# Patient Record
Sex: MALE | Race: WHITE | NOT HISPANIC OR LATINO | URBAN - METROPOLITAN AREA
[De-identification: names, ages, dates, MRNs, and addresses within clinical notes are randomized per-mention and may not be internally consistent; named-entity substitution may affect disease eponyms.]

---

## 2017-01-26 ENCOUNTER — OUTPATIENT (OUTPATIENT)
Dept: OUTPATIENT SERVICES | Facility: HOSPITAL | Age: 57
LOS: 1 days | End: 2017-01-26

## 2017-04-05 ENCOUNTER — APPOINTMENT (OUTPATIENT)
Dept: CARDIOLOGY | Facility: CLINIC | Age: 57
End: 2017-04-05

## 2017-04-24 ENCOUNTER — OUTPATIENT (OUTPATIENT)
Dept: OUTPATIENT SERVICES | Facility: HOSPITAL | Age: 57
LOS: 1 days | End: 2017-04-24

## 2017-07-11 ENCOUNTER — OUTPATIENT (OUTPATIENT)
Dept: OUTPATIENT SERVICES | Facility: HOSPITAL | Age: 57
LOS: 1 days | End: 2017-07-11

## 2017-07-25 ENCOUNTER — OUTPATIENT (OUTPATIENT)
Dept: OUTPATIENT SERVICES | Facility: HOSPITAL | Age: 57
LOS: 1 days | End: 2017-07-25

## 2017-08-10 ENCOUNTER — APPOINTMENT (OUTPATIENT)
Dept: CARDIOLOGY | Facility: CLINIC | Age: 57
End: 2017-08-10
Payer: COMMERCIAL

## 2017-08-10 PROCEDURE — 99214 OFFICE O/P EST MOD 30 MIN: CPT

## 2017-09-16 ENCOUNTER — TRANSCRIPTION ENCOUNTER (OUTPATIENT)
Age: 57
End: 2017-09-16

## 2017-09-18 ENCOUNTER — APPOINTMENT (OUTPATIENT)
Dept: CARDIOLOGY | Facility: CLINIC | Age: 57
End: 2017-09-18
Payer: COMMERCIAL

## 2017-09-18 PROCEDURE — 93306 TTE W/DOPPLER COMPLETE: CPT

## 2017-09-18 PROCEDURE — 78452 HT MUSCLE IMAGE SPECT MULT: CPT

## 2017-09-18 PROCEDURE — A9502: CPT

## 2017-09-18 PROCEDURE — 93015 CV STRESS TEST SUPVJ I&R: CPT

## 2017-09-20 ENCOUNTER — APPOINTMENT (OUTPATIENT)
Dept: CARDIOLOGY | Facility: CLINIC | Age: 57
End: 2017-09-20

## 2017-09-28 ENCOUNTER — APPOINTMENT (OUTPATIENT)
Dept: CARDIOLOGY | Facility: CLINIC | Age: 57
End: 2017-09-28
Payer: COMMERCIAL

## 2017-09-28 PROCEDURE — 99214 OFFICE O/P EST MOD 30 MIN: CPT

## 2018-02-05 ENCOUNTER — RECORD ABSTRACTING (OUTPATIENT)
Age: 58
End: 2018-02-05

## 2018-02-06 ENCOUNTER — MEDICATION RENEWAL (OUTPATIENT)
Age: 58
End: 2018-02-06

## 2018-02-06 ENCOUNTER — RX RENEWAL (OUTPATIENT)
Age: 58
End: 2018-02-06

## 2018-03-28 ENCOUNTER — APPOINTMENT (OUTPATIENT)
Dept: CARDIOLOGY | Facility: CLINIC | Age: 58
End: 2018-03-28

## 2018-05-02 ENCOUNTER — NON-APPOINTMENT (OUTPATIENT)
Age: 58
End: 2018-05-02

## 2018-05-02 ENCOUNTER — APPOINTMENT (OUTPATIENT)
Dept: CARDIOLOGY | Facility: CLINIC | Age: 58
End: 2018-05-02
Payer: COMMERCIAL

## 2018-05-02 VITALS
WEIGHT: 226 LBS | DIASTOLIC BLOOD PRESSURE: 68 MMHG | HEART RATE: 72 BPM | HEIGHT: 72 IN | SYSTOLIC BLOOD PRESSURE: 130 MMHG | BODY MASS INDEX: 30.61 KG/M2

## 2018-05-02 DIAGNOSIS — Z82.0 FAMILY HISTORY OF EPILEPSY AND OTHER DISEASES OF THE NERVOUS SYSTEM: ICD-10-CM

## 2018-05-02 DIAGNOSIS — F32.9 MAJOR DEPRESSIVE DISORDER, SINGLE EPISODE, UNSPECIFIED: ICD-10-CM

## 2018-05-02 DIAGNOSIS — Z86.19 PERSONAL HISTORY OF OTHER INFECTIOUS AND PARASITIC DISEASES: ICD-10-CM

## 2018-05-02 DIAGNOSIS — Z82.49 FAMILY HISTORY OF ISCHEMIC HEART DISEASE AND OTHER DISEASES OF THE CIRCULATORY SYSTEM: ICD-10-CM

## 2018-05-02 DIAGNOSIS — Z83.3 FAMILY HISTORY OF DIABETES MELLITUS: ICD-10-CM

## 2018-05-02 DIAGNOSIS — Z87.898 PERSONAL HISTORY OF OTHER SPECIFIED CONDITIONS: ICD-10-CM

## 2018-05-02 PROCEDURE — 93000 ELECTROCARDIOGRAM COMPLETE: CPT

## 2018-05-02 PROCEDURE — 99214 OFFICE O/P EST MOD 30 MIN: CPT

## 2018-05-02 RX ORDER — BUPROPION HYDROCHLORIDE 300 MG/1
300 TABLET, EXTENDED RELEASE ORAL DAILY
Refills: 0 | Status: ACTIVE | COMMUNITY

## 2018-05-02 RX ORDER — ESCITALOPRAM OXALATE 10 MG/1
10 TABLET, FILM COATED ORAL DAILY
Refills: 0 | Status: ACTIVE | COMMUNITY

## 2018-05-03 PROBLEM — Z87.898 H/O SHORTNESS OF BREATH: Status: ACTIVE | Noted: 2018-02-05

## 2018-05-03 PROBLEM — Z83.3 FAMILY HISTORY OF DIABETES MELLITUS: Status: ACTIVE | Noted: 2018-05-03

## 2018-05-03 PROBLEM — Z82.49 FAMILY HISTORY OF CORONARY ARTERY DISEASE: Status: ACTIVE | Noted: 2018-05-03

## 2018-05-03 PROBLEM — Z82.0 FAMILY HISTORY OF ALZHEIMER'S DISEASE: Status: ACTIVE | Noted: 2018-05-03

## 2018-05-04 ENCOUNTER — RX RENEWAL (OUTPATIENT)
Age: 58
End: 2018-05-04

## 2018-08-07 ENCOUNTER — RX RENEWAL (OUTPATIENT)
Age: 58
End: 2018-08-07

## 2018-11-12 ENCOUNTER — APPOINTMENT (OUTPATIENT)
Dept: CARDIOLOGY | Facility: CLINIC | Age: 58
End: 2018-11-12
Payer: COMMERCIAL

## 2018-11-12 VITALS
OXYGEN SATURATION: 95 % | HEART RATE: 78 BPM | SYSTOLIC BLOOD PRESSURE: 120 MMHG | DIASTOLIC BLOOD PRESSURE: 68 MMHG | WEIGHT: 220 LBS | HEIGHT: 72 IN | BODY MASS INDEX: 29.8 KG/M2

## 2018-11-12 PROCEDURE — 99213 OFFICE O/P EST LOW 20 MIN: CPT

## 2018-12-28 ENCOUNTER — EMERGENCY (EMERGENCY)
Facility: HOSPITAL | Age: 58
LOS: 1 days | End: 2018-12-28
Payer: MEDICAID

## 2018-12-28 PROCEDURE — 99283 EMERGENCY DEPT VISIT LOW MDM: CPT

## 2019-03-19 ENCOUNTER — RX CHANGE (OUTPATIENT)
Age: 59
End: 2019-03-19

## 2019-03-28 ENCOUNTER — MEDICATION RENEWAL (OUTPATIENT)
Age: 59
End: 2019-03-28

## 2019-05-04 ENCOUNTER — TRANSCRIPTION ENCOUNTER (OUTPATIENT)
Age: 59
End: 2019-05-04

## 2019-05-06 ENCOUNTER — APPOINTMENT (OUTPATIENT)
Dept: CARDIOLOGY | Facility: CLINIC | Age: 59
End: 2019-05-06
Payer: COMMERCIAL

## 2019-05-06 PROCEDURE — 78452 HT MUSCLE IMAGE SPECT MULT: CPT

## 2019-05-06 PROCEDURE — A9502: CPT

## 2019-05-06 PROCEDURE — 93015 CV STRESS TEST SUPVJ I&R: CPT

## 2019-05-13 ENCOUNTER — APPOINTMENT (OUTPATIENT)
Dept: CARDIOLOGY | Facility: CLINIC | Age: 59
End: 2019-05-13
Payer: COMMERCIAL

## 2019-05-13 ENCOUNTER — NON-APPOINTMENT (OUTPATIENT)
Age: 59
End: 2019-05-13

## 2019-05-13 VITALS
WEIGHT: 220 LBS | SYSTOLIC BLOOD PRESSURE: 110 MMHG | OXYGEN SATURATION: 96 % | HEIGHT: 72 IN | DIASTOLIC BLOOD PRESSURE: 80 MMHG | BODY MASS INDEX: 29.8 KG/M2 | HEART RATE: 90 BPM

## 2019-05-13 DIAGNOSIS — I25.2 OLD MYOCARDIAL INFARCTION: ICD-10-CM

## 2019-05-13 PROCEDURE — 93000 ELECTROCARDIOGRAM COMPLETE: CPT

## 2019-05-13 PROCEDURE — 99214 OFFICE O/P EST MOD 30 MIN: CPT

## 2019-10-01 ENCOUNTER — APPOINTMENT (OUTPATIENT)
Dept: CARDIOLOGY | Facility: CLINIC | Age: 59
End: 2019-10-01

## 2019-11-18 ENCOUNTER — APPOINTMENT (OUTPATIENT)
Dept: CARDIOLOGY | Facility: CLINIC | Age: 59
End: 2019-11-18

## 2020-06-30 ENCOUNTER — APPOINTMENT (OUTPATIENT)
Dept: MRI IMAGING | Facility: CLINIC | Age: 60
End: 2020-06-30
Payer: COMMERCIAL

## 2020-06-30 PROCEDURE — 70551 MRI BRAIN STEM W/O DYE: CPT

## 2020-08-26 DIAGNOSIS — R79.89 OTHER SPECIFIED ABNORMAL FINDINGS OF BLOOD CHEMISTRY: ICD-10-CM

## 2020-09-08 ENCOUNTER — RX RENEWAL (OUTPATIENT)
Age: 60
End: 2020-09-08

## 2020-09-25 ENCOUNTER — NON-APPOINTMENT (OUTPATIENT)
Age: 60
End: 2020-09-25

## 2020-09-25 ENCOUNTER — APPOINTMENT (OUTPATIENT)
Dept: CARDIOLOGY | Facility: CLINIC | Age: 60
End: 2020-09-25
Payer: COMMERCIAL

## 2020-09-25 VITALS
BODY MASS INDEX: 31.42 KG/M2 | OXYGEN SATURATION: 98 % | SYSTOLIC BLOOD PRESSURE: 120 MMHG | WEIGHT: 232 LBS | DIASTOLIC BLOOD PRESSURE: 86 MMHG | TEMPERATURE: 97.8 F | HEART RATE: 66 BPM | HEIGHT: 72 IN

## 2020-09-25 PROCEDURE — 99214 OFFICE O/P EST MOD 30 MIN: CPT

## 2020-09-25 PROCEDURE — 93000 ELECTROCARDIOGRAM COMPLETE: CPT

## 2020-10-06 ENCOUNTER — APPOINTMENT (OUTPATIENT)
Dept: CARDIOLOGY | Facility: CLINIC | Age: 60
End: 2020-10-06

## 2020-10-13 ENCOUNTER — APPOINTMENT (OUTPATIENT)
Dept: DERMATOLOGY | Facility: CLINIC | Age: 60
End: 2020-10-13
Payer: COMMERCIAL

## 2020-10-13 DIAGNOSIS — Q83.3 ACCESSORY NIPPLE: ICD-10-CM

## 2020-10-13 DIAGNOSIS — D22.5 MELANOCYTIC NEVI OF TRUNK: ICD-10-CM

## 2020-10-13 DIAGNOSIS — L82.1 OTHER SEBORRHEIC KERATOSIS: ICD-10-CM

## 2020-10-13 PROCEDURE — 99203 OFFICE O/P NEW LOW 30 MIN: CPT

## 2020-10-13 NOTE — HISTORY OF PRESENT ILLNESS
[FreeTextEntry1] : Evaluation of growths [de-identified] : First visit for 60-year-old male presenting for evaluation of growths. Particularly concerned about a lesion on the right forearm. No history of skin cancer.

## 2020-10-13 NOTE — PHYSICAL EXAM
[Alert] : alert [Oriented x 3] : ~L oriented x 3 [Well Nourished] : well nourished [FreeTextEntry3] : The following areas were examined and no significant abnormalities were seen except as noted below:\par \par Type II skin\par \par scalp, face, eyelids, nose, lips, ears, neck, chest, abdomen, back,groin, buttocks, right arm, left arm, right hand, left hand,\par right  leg, left leg, right foot, left foot\par \par Right mid forearm: 4 x 4 mm tan verrucous papule\par Right lower chest: Supernumerary nipple present\par Right mid back: 2 small pink soft papules\par \par No suspicious lesions seen

## 2020-10-20 ENCOUNTER — APPOINTMENT (OUTPATIENT)
Dept: CARDIOLOGY | Facility: CLINIC | Age: 60
End: 2020-10-20
Payer: COMMERCIAL

## 2020-10-20 PROCEDURE — 93306 TTE W/DOPPLER COMPLETE: CPT

## 2021-03-03 ENCOUNTER — RX RENEWAL (OUTPATIENT)
Age: 61
End: 2021-03-03

## 2021-04-30 ENCOUNTER — NON-APPOINTMENT (OUTPATIENT)
Age: 61
End: 2021-04-30

## 2021-04-30 ENCOUNTER — APPOINTMENT (OUTPATIENT)
Dept: CARDIOLOGY | Facility: CLINIC | Age: 61
End: 2021-04-30
Payer: COMMERCIAL

## 2021-04-30 VITALS
DIASTOLIC BLOOD PRESSURE: 80 MMHG | BODY MASS INDEX: 32.82 KG/M2 | WEIGHT: 242 LBS | OXYGEN SATURATION: 98 % | TEMPERATURE: 97.7 F | SYSTOLIC BLOOD PRESSURE: 120 MMHG | HEART RATE: 73 BPM

## 2021-04-30 PROCEDURE — 93000 ELECTROCARDIOGRAM COMPLETE: CPT

## 2021-04-30 PROCEDURE — 99072 ADDL SUPL MATRL&STAF TM PHE: CPT

## 2021-04-30 PROCEDURE — 99214 OFFICE O/P EST MOD 30 MIN: CPT

## 2021-07-14 ENCOUNTER — APPOINTMENT (OUTPATIENT)
Dept: DERMATOLOGY | Facility: CLINIC | Age: 61
End: 2021-07-14
Payer: COMMERCIAL

## 2021-07-14 DIAGNOSIS — L50.3 DERMATOGRAPHIC URTICARIA: ICD-10-CM

## 2021-07-14 DIAGNOSIS — L29.9 PRURITUS, UNSPECIFIED: ICD-10-CM

## 2021-07-14 PROCEDURE — 99213 OFFICE O/P EST LOW 20 MIN: CPT

## 2021-07-14 NOTE — PHYSICAL EXAM
[Alert] : alert [Oriented x 3] : ~L oriented x 3 [Well Nourished] : well nourished [FreeTextEntry3] : Patient wearing a facemask\par \par No rash present\par \par Stroke test: \par 2+ flare\par 1+ wheal\par No pruritus

## 2021-07-14 NOTE — HISTORY OF PRESENT ILLNESS
[FreeTextEntry1] : Itching [de-identified] : Followup visit for 60-year-old male last seen by me on October 13, 2020, with a few month history of episodic migratory pruritus.  Itching waxes and wanes.  Itching does not wake him up.\par No previous treatment. No previous episodes.  On no new medications.\par No one else at home is itchy.

## 2022-01-23 ENCOUNTER — EMERGENCY (EMERGENCY)
Facility: HOSPITAL | Age: 62
LOS: 1 days | Discharge: HOME | End: 2022-01-23
Payer: MEDICAID

## 2022-01-23 ENCOUNTER — OUTPATIENT (OUTPATIENT)
Dept: OUTPATIENT SERVICES | Facility: HOSPITAL | Age: 62
LOS: 1 days | End: 2022-01-23

## 2022-01-23 DIAGNOSIS — R07.9 CHEST PAIN, UNSPECIFIED: ICD-10-CM

## 2022-01-23 DIAGNOSIS — I25.2 OLD MYOCARDIAL INFARCTION: ICD-10-CM

## 2022-01-23 DIAGNOSIS — I25.10 ATHEROSCLEROTIC HEART DISEASE OF NATIVE CORONARY ARTERY WITHOUT ANGINA PECTORIS: ICD-10-CM

## 2022-01-23 DIAGNOSIS — E78.5 HYPERLIPIDEMIA, UNSPECIFIED: ICD-10-CM

## 2022-01-23 DIAGNOSIS — Z79.82 LONG TERM (CURRENT) USE OF ASPIRIN: ICD-10-CM

## 2022-01-23 DIAGNOSIS — I10 ESSENTIAL (PRIMARY) HYPERTENSION: ICD-10-CM

## 2022-01-23 DIAGNOSIS — Z95.5 PRESENCE OF CORONARY ANGIOPLASTY IMPLANT AND GRAFT: ICD-10-CM

## 2022-01-23 PROCEDURE — 99222 1ST HOSP IP/OBS MODERATE 55: CPT

## 2022-01-23 PROCEDURE — 99285 EMERGENCY DEPT VISIT HI MDM: CPT

## 2022-01-23 PROCEDURE — 93306 TTE W/DOPPLER COMPLETE: CPT | Mod: 26

## 2022-01-23 PROCEDURE — 93010 ELECTROCARDIOGRAM REPORT: CPT

## 2022-01-23 PROCEDURE — 71045 X-RAY EXAM CHEST 1 VIEW: CPT | Mod: 26

## 2022-01-24 ENCOUNTER — OUTPATIENT (OUTPATIENT)
Dept: OUTPATIENT SERVICES | Facility: HOSPITAL | Age: 62
LOS: 1 days | End: 2022-01-24

## 2022-01-24 PROCEDURE — 93306 TTE W/DOPPLER COMPLETE: CPT | Mod: 26

## 2022-01-24 PROCEDURE — 99232 SBSQ HOSP IP/OBS MODERATE 35: CPT

## 2022-01-24 PROCEDURE — 93010 ELECTROCARDIOGRAM REPORT: CPT

## 2022-01-27 ENCOUNTER — RESULT CHARGE (OUTPATIENT)
Age: 62
End: 2022-01-27

## 2022-01-28 ENCOUNTER — NON-APPOINTMENT (OUTPATIENT)
Age: 62
End: 2022-01-28

## 2022-01-28 ENCOUNTER — APPOINTMENT (OUTPATIENT)
Dept: CARDIOLOGY | Facility: CLINIC | Age: 62
End: 2022-01-28
Payer: COMMERCIAL

## 2022-01-28 VITALS
OXYGEN SATURATION: 96 % | BODY MASS INDEX: 32.78 KG/M2 | WEIGHT: 242 LBS | DIASTOLIC BLOOD PRESSURE: 74 MMHG | HEART RATE: 73 BPM | SYSTOLIC BLOOD PRESSURE: 122 MMHG | HEIGHT: 72 IN | TEMPERATURE: 97.5 F

## 2022-01-28 PROCEDURE — 99214 OFFICE O/P EST MOD 30 MIN: CPT

## 2022-02-01 ENCOUNTER — NON-APPOINTMENT (OUTPATIENT)
Age: 62
End: 2022-02-01

## 2022-02-02 ENCOUNTER — APPOINTMENT (OUTPATIENT)
Dept: CARDIOLOGY | Facility: CLINIC | Age: 62
End: 2022-02-02
Payer: COMMERCIAL

## 2022-02-02 PROCEDURE — 93015 CV STRESS TEST SUPVJ I&R: CPT

## 2022-02-02 PROCEDURE — A9502: CPT

## 2022-02-02 PROCEDURE — 78452 HT MUSCLE IMAGE SPECT MULT: CPT

## 2022-02-03 ENCOUNTER — NON-APPOINTMENT (OUTPATIENT)
Age: 62
End: 2022-02-03

## 2022-02-04 ENCOUNTER — NON-APPOINTMENT (OUTPATIENT)
Age: 62
End: 2022-02-04

## 2022-02-07 ENCOUNTER — LABORATORY RESULT (OUTPATIENT)
Age: 62
End: 2022-02-07

## 2022-02-08 LAB — SARS-COV-2 N GENE NPH QL NAA+PROBE: NOT DETECTED

## 2022-02-10 ENCOUNTER — OUTPATIENT (OUTPATIENT)
Dept: OUTPATIENT SERVICES | Facility: HOSPITAL | Age: 62
LOS: 1 days | End: 2022-02-10
Payer: MEDICAID

## 2022-02-10 PROCEDURE — 93571 IV DOP VEL&/PRESS C FLO 1ST: CPT | Mod: 26,52,59

## 2022-02-10 PROCEDURE — 93010 ELECTROCARDIOGRAM REPORT: CPT

## 2022-02-10 PROCEDURE — 93458 L HRT ARTERY/VENTRICLE ANGIO: CPT | Mod: 26

## 2022-02-11 ENCOUNTER — INPATIENT (INPATIENT)
Facility: HOSPITAL | Age: 62
LOS: 18 days | Discharge: HOME HEALTH | DRG: 235 | End: 2022-03-02
Attending: THORACIC SURGERY (CARDIOTHORACIC VASCULAR SURGERY) | Admitting: THORACIC SURGERY (CARDIOTHORACIC VASCULAR SURGERY)
Payer: COMMERCIAL

## 2022-02-11 VITALS
RESPIRATION RATE: 18 BRPM | DIASTOLIC BLOOD PRESSURE: 77 MMHG | HEART RATE: 60 BPM | TEMPERATURE: 99 F | OXYGEN SATURATION: 97 % | SYSTOLIC BLOOD PRESSURE: 120 MMHG

## 2022-02-11 DIAGNOSIS — Z90.49 ACQUIRED ABSENCE OF OTHER SPECIFIED PARTS OF DIGESTIVE TRACT: Chronic | ICD-10-CM

## 2022-02-11 DIAGNOSIS — Z98.890 OTHER SPECIFIED POSTPROCEDURAL STATES: Chronic | ICD-10-CM

## 2022-02-11 DIAGNOSIS — I25.10 ATHEROSCLEROTIC HEART DISEASE OF NATIVE CORONARY ARTERY WITHOUT ANGINA PECTORIS: ICD-10-CM

## 2022-02-11 LAB
A1C WITH ESTIMATED AVERAGE GLUCOSE RESULT: 6.4 % — HIGH (ref 4–5.6)
ALBUMIN SERPL ELPH-MCNC: 3.9 G/DL — SIGNIFICANT CHANGE UP (ref 3.3–5)
ALP SERPL-CCNC: 67 U/L — SIGNIFICANT CHANGE UP (ref 40–120)
ALT FLD-CCNC: 33 U/L — SIGNIFICANT CHANGE UP (ref 10–45)
ANION GAP SERPL CALC-SCNC: 10 MMOL/L — SIGNIFICANT CHANGE UP (ref 5–17)
APPEARANCE UR: CLEAR — SIGNIFICANT CHANGE UP
APTT BLD: 32.5 SEC — SIGNIFICANT CHANGE UP (ref 27.5–35.5)
AST SERPL-CCNC: 22 U/L — SIGNIFICANT CHANGE UP (ref 10–40)
BASOPHILS # BLD AUTO: 0.05 K/UL — SIGNIFICANT CHANGE UP (ref 0–0.2)
BASOPHILS NFR BLD AUTO: 0.7 % — SIGNIFICANT CHANGE UP (ref 0–2)
BILIRUB SERPL-MCNC: 0.3 MG/DL — SIGNIFICANT CHANGE UP (ref 0.2–1.2)
BILIRUB UR-MCNC: NEGATIVE — SIGNIFICANT CHANGE UP
BLD GP AB SCN SERPL QL: NEGATIVE — SIGNIFICANT CHANGE UP
BUN SERPL-MCNC: 17 MG/DL — SIGNIFICANT CHANGE UP (ref 7–23)
CALCIUM SERPL-MCNC: 9.3 MG/DL — SIGNIFICANT CHANGE UP (ref 8.4–10.5)
CHLORIDE SERPL-SCNC: 105 MMOL/L — SIGNIFICANT CHANGE UP (ref 96–108)
CHOLEST SERPL-MCNC: 121 MG/DL — SIGNIFICANT CHANGE UP
CO2 SERPL-SCNC: 25 MMOL/L — SIGNIFICANT CHANGE UP (ref 22–31)
COLOR SPEC: SIGNIFICANT CHANGE UP
COVID-19 SPIKE DOMAIN AB INTERP: POSITIVE
COVID-19 SPIKE DOMAIN ANTIBODY RESULT: >250 U/ML — HIGH
CREAT SERPL-MCNC: 0.96 MG/DL — SIGNIFICANT CHANGE UP (ref 0.5–1.3)
DIFF PNL FLD: NEGATIVE — SIGNIFICANT CHANGE UP
EOSINOPHIL # BLD AUTO: 0.09 K/UL — SIGNIFICANT CHANGE UP (ref 0–0.5)
EOSINOPHIL NFR BLD AUTO: 1.3 % — SIGNIFICANT CHANGE UP (ref 0–6)
ESTIMATED AVERAGE GLUCOSE: 137 MG/DL — HIGH (ref 68–114)
FIBRINOGEN PPP-MCNC: 373 MG/DL — SIGNIFICANT CHANGE UP (ref 290–520)
GLUCOSE SERPL-MCNC: 111 MG/DL — HIGH (ref 70–99)
GLUCOSE UR QL: NEGATIVE — SIGNIFICANT CHANGE UP
HCT VFR BLD CALC: 41.1 % — SIGNIFICANT CHANGE UP (ref 39–50)
HDLC SERPL-MCNC: 26 MG/DL — LOW
HGB BLD-MCNC: 13.8 G/DL — SIGNIFICANT CHANGE UP (ref 13–17)
IMM GRANULOCYTES NFR BLD AUTO: 0.1 % — SIGNIFICANT CHANGE UP (ref 0–1.5)
INR BLD: 1.08 RATIO — SIGNIFICANT CHANGE UP (ref 0.88–1.16)
KETONES UR-MCNC: NEGATIVE — SIGNIFICANT CHANGE UP
LEUKOCYTE ESTERASE UR-ACNC: NEGATIVE — SIGNIFICANT CHANGE UP
LIPID PNL WITH DIRECT LDL SERPL: 54 MG/DL — SIGNIFICANT CHANGE UP
LYMPHOCYTES # BLD AUTO: 2.44 K/UL — SIGNIFICANT CHANGE UP (ref 1–3.3)
LYMPHOCYTES # BLD AUTO: 35.8 % — SIGNIFICANT CHANGE UP (ref 13–44)
MCHC RBC-ENTMCNC: 30.1 PG — SIGNIFICANT CHANGE UP (ref 27–34)
MCHC RBC-ENTMCNC: 33.6 GM/DL — SIGNIFICANT CHANGE UP (ref 32–36)
MCV RBC AUTO: 89.5 FL — SIGNIFICANT CHANGE UP (ref 80–100)
MONOCYTES # BLD AUTO: 0.67 K/UL — SIGNIFICANT CHANGE UP (ref 0–0.9)
MONOCYTES NFR BLD AUTO: 9.8 % — SIGNIFICANT CHANGE UP (ref 2–14)
MRSA PCR RESULT.: SIGNIFICANT CHANGE UP
NEUTROPHILS # BLD AUTO: 3.55 K/UL — SIGNIFICANT CHANGE UP (ref 1.8–7.4)
NEUTROPHILS NFR BLD AUTO: 52.3 % — SIGNIFICANT CHANGE UP (ref 43–77)
NITRITE UR-MCNC: NEGATIVE — SIGNIFICANT CHANGE UP
NON HDL CHOLESTEROL: 95 MG/DL — SIGNIFICANT CHANGE UP
NRBC # BLD: 0 /100 WBCS — SIGNIFICANT CHANGE UP (ref 0–0)
NT-PROBNP SERPL-SCNC: 83 PG/ML — SIGNIFICANT CHANGE UP (ref 0–300)
PA ADP PRP-ACNC: 255 PRU — SIGNIFICANT CHANGE UP (ref 194–417)
PH UR: 6 — SIGNIFICANT CHANGE UP (ref 5–8)
PLATELET # BLD AUTO: 228 K/UL — SIGNIFICANT CHANGE UP (ref 150–400)
POTASSIUM SERPL-MCNC: 4.2 MMOL/L — SIGNIFICANT CHANGE UP (ref 3.5–5.3)
POTASSIUM SERPL-SCNC: 4.2 MMOL/L — SIGNIFICANT CHANGE UP (ref 3.5–5.3)
PROT SERPL-MCNC: 6.1 G/DL — SIGNIFICANT CHANGE UP (ref 6–8.3)
PROT UR-MCNC: NEGATIVE — SIGNIFICANT CHANGE UP
PROTHROM AB SERPL-ACNC: 12.9 SEC — SIGNIFICANT CHANGE UP (ref 10.6–13.6)
RBC # BLD: 4.59 M/UL — SIGNIFICANT CHANGE UP (ref 4.2–5.8)
RBC # FLD: 13.1 % — SIGNIFICANT CHANGE UP (ref 10.3–14.5)
RH IG SCN BLD-IMP: POSITIVE — SIGNIFICANT CHANGE UP
S AUREUS DNA NOSE QL NAA+PROBE: SIGNIFICANT CHANGE UP
SARS-COV-2 IGG+IGM SERPL QL IA: >250 U/ML — HIGH
SARS-COV-2 IGG+IGM SERPL QL IA: POSITIVE
SARS-COV-2 RNA SPEC QL NAA+PROBE: SIGNIFICANT CHANGE UP
SODIUM SERPL-SCNC: 140 MMOL/L — SIGNIFICANT CHANGE UP (ref 135–145)
SP GR SPEC: 1.03 — HIGH (ref 1.01–1.02)
TRIGL SERPL-MCNC: 201 MG/DL — HIGH
TSH SERPL-MCNC: 2.66 UIU/ML — SIGNIFICANT CHANGE UP (ref 0.27–4.2)
UROBILINOGEN FLD QL: NEGATIVE — SIGNIFICANT CHANGE UP
WBC # BLD: 6.81 K/UL — SIGNIFICANT CHANGE UP (ref 3.8–10.5)
WBC # FLD AUTO: 6.81 K/UL — SIGNIFICANT CHANGE UP (ref 3.8–10.5)

## 2022-02-11 PROCEDURE — 71045 X-RAY EXAM CHEST 1 VIEW: CPT | Mod: 26

## 2022-02-11 PROCEDURE — 99231 SBSQ HOSP IP/OBS SF/LOW 25: CPT

## 2022-02-11 PROCEDURE — 93880 EXTRACRANIAL BILAT STUDY: CPT | Mod: 26

## 2022-02-11 PROCEDURE — 93010 ELECTROCARDIOGRAM REPORT: CPT

## 2022-02-11 PROCEDURE — 93306 TTE W/DOPPLER COMPLETE: CPT | Mod: 26

## 2022-02-11 RX ORDER — ASPIRIN/CALCIUM CARB/MAGNESIUM 324 MG
325 TABLET ORAL DAILY
Refills: 0 | Status: DISCONTINUED | OUTPATIENT
Start: 2022-02-11 | End: 2022-02-25

## 2022-02-11 RX ORDER — ATORVASTATIN CALCIUM 80 MG/1
1 TABLET, FILM COATED ORAL
Qty: 0 | Refills: 0 | DISCHARGE

## 2022-02-11 RX ORDER — FOLIC ACID 0.8 MG
1 TABLET ORAL DAILY
Refills: 0 | Status: DISCONTINUED | OUTPATIENT
Start: 2022-02-11 | End: 2022-02-25

## 2022-02-11 RX ORDER — ERYTHROPOIETIN 10000 [IU]/ML
1000 INJECTION, SOLUTION INTRAVENOUS; SUBCUTANEOUS ONCE
Refills: 0 | Status: DISCONTINUED | OUTPATIENT
Start: 2022-02-11 | End: 2022-02-11

## 2022-02-11 RX ORDER — ESCITALOPRAM OXALATE 10 MG/1
10 TABLET, FILM COATED ORAL DAILY
Refills: 0 | Status: DISCONTINUED | OUTPATIENT
Start: 2022-02-11 | End: 2022-02-25

## 2022-02-11 RX ORDER — BUPROPION HYDROCHLORIDE 150 MG/1
1 TABLET, EXTENDED RELEASE ORAL
Qty: 0 | Refills: 0 | DISCHARGE

## 2022-02-11 RX ORDER — ASCORBIC ACID 60 MG
500 TABLET,CHEWABLE ORAL DAILY
Refills: 0 | Status: DISCONTINUED | OUTPATIENT
Start: 2022-02-11 | End: 2022-02-25

## 2022-02-11 RX ORDER — SODIUM CHLORIDE 9 MG/ML
3 INJECTION INTRAMUSCULAR; INTRAVENOUS; SUBCUTANEOUS EVERY 8 HOURS
Refills: 0 | Status: DISCONTINUED | OUTPATIENT
Start: 2022-02-11 | End: 2022-02-25

## 2022-02-11 RX ORDER — CARVEDILOL PHOSPHATE 80 MG/1
3.12 CAPSULE, EXTENDED RELEASE ORAL EVERY 12 HOURS
Refills: 0 | Status: DISCONTINUED | OUTPATIENT
Start: 2022-02-11 | End: 2022-02-15

## 2022-02-11 RX ORDER — ESCITALOPRAM OXALATE 10 MG/1
1 TABLET, FILM COATED ORAL
Qty: 0 | Refills: 0 | DISCHARGE

## 2022-02-11 RX ORDER — ATORVASTATIN CALCIUM 80 MG/1
80 TABLET, FILM COATED ORAL AT BEDTIME
Refills: 0 | Status: DISCONTINUED | OUTPATIENT
Start: 2022-02-11 | End: 2022-02-25

## 2022-02-11 RX ORDER — BUPROPION HYDROCHLORIDE 150 MG/1
300 TABLET, EXTENDED RELEASE ORAL DAILY
Refills: 0 | Status: DISCONTINUED | OUTPATIENT
Start: 2022-02-11 | End: 2022-02-25

## 2022-02-11 RX ORDER — PANTOPRAZOLE SODIUM 20 MG/1
40 TABLET, DELAYED RELEASE ORAL
Refills: 0 | Status: DISCONTINUED | OUTPATIENT
Start: 2022-02-11 | End: 2022-02-25

## 2022-02-11 RX ORDER — ERYTHROPOIETIN 10000 [IU]/ML
10000 INJECTION, SOLUTION INTRAVENOUS; SUBCUTANEOUS
Refills: 0 | Status: DISCONTINUED | OUTPATIENT
Start: 2022-02-11 | End: 2022-02-16

## 2022-02-11 RX ORDER — INFLUENZA VIRUS VACCINE 15; 15; 15; 15 UG/.5ML; UG/.5ML; UG/.5ML; UG/.5ML
0.5 SUSPENSION INTRAMUSCULAR ONCE
Refills: 0 | Status: DISCONTINUED | OUTPATIENT
Start: 2022-02-11 | End: 2022-02-27

## 2022-02-11 RX ADMIN — SODIUM CHLORIDE 3 MILLILITER(S): 9 INJECTION INTRAMUSCULAR; INTRAVENOUS; SUBCUTANEOUS at 13:01

## 2022-02-11 RX ADMIN — PANTOPRAZOLE SODIUM 40 MILLIGRAM(S): 20 TABLET, DELAYED RELEASE ORAL at 06:16

## 2022-02-11 RX ADMIN — SODIUM CHLORIDE 3 MILLILITER(S): 9 INJECTION INTRAMUSCULAR; INTRAVENOUS; SUBCUTANEOUS at 22:34

## 2022-02-11 RX ADMIN — BUPROPION HYDROCHLORIDE 300 MILLIGRAM(S): 150 TABLET, EXTENDED RELEASE ORAL at 11:09

## 2022-02-11 RX ADMIN — SODIUM CHLORIDE 3 MILLILITER(S): 9 INJECTION INTRAMUSCULAR; INTRAVENOUS; SUBCUTANEOUS at 07:08

## 2022-02-11 RX ADMIN — ERYTHROPOIETIN 10000 UNIT(S): 10000 INJECTION, SOLUTION INTRAVENOUS; SUBCUTANEOUS at 11:48

## 2022-02-11 RX ADMIN — Medication 1 MILLIGRAM(S): at 11:10

## 2022-02-11 RX ADMIN — Medication 325 MILLIGRAM(S): at 11:09

## 2022-02-11 RX ADMIN — Medication 1 TABLET(S): at 11:10

## 2022-02-11 RX ADMIN — ATORVASTATIN CALCIUM 80 MILLIGRAM(S): 80 TABLET, FILM COATED ORAL at 21:52

## 2022-02-11 RX ADMIN — ESCITALOPRAM OXALATE 10 MILLIGRAM(S): 10 TABLET, FILM COATED ORAL at 11:09

## 2022-02-11 RX ADMIN — Medication 500 MILLIGRAM(S): at 11:10

## 2022-02-11 RX ADMIN — CARVEDILOL PHOSPHATE 3.12 MILLIGRAM(S): 80 CAPSULE, EXTENDED RELEASE ORAL at 17:17

## 2022-02-11 RX ADMIN — CARVEDILOL PHOSPHATE 3.12 MILLIGRAM(S): 80 CAPSULE, EXTENDED RELEASE ORAL at 06:16

## 2022-02-11 NOTE — H&P ADULT - NSHPSOCIALHISTORY_GEN_ALL_CORE
Drinks ETOH appx 1x/month. Never smoker. No recreational drug use.   Lives with wife and 2 out of 3 adult children and 2 dogs.    Occupation: contractor

## 2022-02-11 NOTE — H&P ADULT - NSICDXPASTMEDICALHX_GEN_ALL_CORE_FT
PAST MEDICAL HISTORY:  Anxiety and depression     CAD (coronary artery disease)     Hyperlipidemia     Hypertension

## 2022-02-11 NOTE — H&P ADULT - NSHPLABSRESULTS_GEN_ALL_CORE
OSH:    BUN/Cr: 18/1.25  H/H: 15.1/48.1  Plt: 271    Na  142  K+ 4.5      TTE: mild hypokinesis of the inferior wall, LVEF 50-55%

## 2022-02-11 NOTE — H&P ADULT - NSHPPHYSICALEXAM_GEN_ALL_CORE
HR SR 60  /77  Temp 37 C  SpO2 97% RA      General: well nourished, well developed. Resting comfortably in bed.  Neuro: A&O, no focal deficits. Conversing appropriately.  HEENT: NCAT, MMM  Cardiac: RRR, no MRG  Resp: Lungs CTA bilaterally, no wheezes, rales, rhonchi  GI: abd soft, NT/ND. +BS.  : no persaud present  Ext: No peripheral edema  Vascular: palpable radial, ulnar, DP, PT pulses bilaterally. R radial artery cath site > nontender to palpation, no hematoma, C/D/I. R femoral artery cath site > nontender to palpation, no hematoma, no dressing. Site is C/D/I.

## 2022-02-11 NOTE — H&P ADULT - NSICDXPASTSURGICALHX_GEN_ALL_CORE_FT
PAST SURGICAL HISTORY:  History of appendectomy     S/P cholecystectomy     S/P inguinal hernia repair

## 2022-02-11 NOTE — H&P ADULT - NSHPREVIEWOFSYSTEMS_GEN_ALL_CORE
General:  no weakness, fatigue  Skin: no itching, rashes, or lesions   HEENT: no visual changes;  no headache, sore throat   Neck: no pain, stiffness or swollen glands  Respiratory: no cough, sputum, wheezing, hemoptysis; no shortness of breath at this time, +SOB with exertion, walking the dog  Cardiovascular: no chest pain, dyspnea or palpitations at this time. +CP and chest pressure at home, both at rest and with exertion  GI: no abdominal or epigastric pain. no heartburn, nausea, vomiting; no diarrhea or constipation  : no dysuria  Peripheral Vascular: no intermittent claudication, leg cramps, swelling or swelling with redness and tenderness  Musculoskeletal: Admits to tightness in hamstrings, needing physical therapy  Neuro: no changes in orientation, no dizziness, vertigo or fainting, numbness, tingling or weakness

## 2022-02-11 NOTE — H&P ADULT - ASSESSMENT
Pt is 60 y/o Latter-day M w/ PMH depression, HLD, HTN, CAD s/p stents (2015 Jan [facility], June [facility]).   Presented to [facility] for CP/SOB, further workup output revealed stenosis of prior stents.    Pt transferred to [facility] for CABG workup. He expressed interest in "bloodless" surgery due to his JW beliefs.     Neuro: hx depression  -Resumed on home wellbutrin and lexapro  -Carotid duplex ordered for pre-op w/up    Cardiac: HTN, HLD, CAD  -Follow up final cath report, not in paper chart presently, will get records from [facility]  -TTE, EKG  -Resumed ASA/statin/BB  -Asymptomatic at this time, will d/w surgeon operative planning    Resp:  -CXR for pre-op planning  -IS  -Supplemental O2 as needed, currently on RA, no resp distress, not short of breath at this time    GI:  -GI ppx  -DASH diet    Renal:  -F/up rpt BMP after contrast  -Monitor I/O, replete electrolytes prn    Heme:  -Latter-day, pt is interested in "bloodless" surgery, not willing to accept blood products. Will ask family/ about albumin.  -Will start retacrit  -Cont ASA  -SCDs for DVT ppx at this time, will discuss risks/benefits of chemical prophylaxis given JW status  -Will minimize lab draws in setting of JW, will use pediatric tubes    ID:  -No active issues  -Chery-op abx when OR is planned  -Monitor for F, WBC  -MRSA/MSSA swab ordered, COVID rpt ordered  -S/p COVID vaccine Moderna x3      Admit to CT Surgery under Dr. Solis.

## 2022-02-11 NOTE — PROGRESS NOTE ADULT - PROBLEM SELECTOR PLAN 1
ator   BB   and asa  minimal blood draws as pt is a jehovah witness,  is on iron    folic acis and vitamin C    OR date to be determined with Dr Aaron   for next week

## 2022-02-11 NOTE — H&P ADULT - HISTORY OF PRESENT ILLNESS
Pt is 60 y/o Caodaism M w/ PMH depression, HLD, HTN, CAD s/p stents (2015 Jan [facility], June [place], on Brilinta in past,  only now), inguinal hernia s/p repair, cholecystectomy (2012), and appendectomy (1970). Per pt on 1/22 began feeling L sided chest pressure and SOB, took an aspirin and felt better. Following day the pain returned and he went to [facility] for evaluation, where per pt serial troponins and EKG were done pt told they were negative and he was sent home. TTE was also done that day with mild hypokinesis of the inferior wall, LVEF 50-55%. His home cardiologist instructed him to go for a stress test, which he did on 2/2/22.   He then went for cardiac cath on 2/10, attempted catheterization of R wrist unsuccessful, cath proceeded in R groin. Pt was told that one of his stents was occluded and another was "down 50%".     Pt transferred to [facility] for CABG workup. He expressed interest in "bloodless" surgery due to his JW beliefs.

## 2022-02-11 NOTE — PROGRESS NOTE ADULT - ASSESSMENT
Pt is 62 y/o Evangelical M w/ PMH depression, HLD, HTN, CAD s/p stents (2015 Jan [facility], June [facility]).   Presented to [facility] for CP/SOB, further workup output revealed stenosis of prior stents/ CAD  2/11   no chest pain   on coreq,  asa   ator

## 2022-02-11 NOTE — PROGRESS NOTE ADULT - SUBJECTIVE AND OBJECTIVE BOX
VITAL SIGNS    Telemetry:     SR     Vital Signs Last 24 Hrs  T(C): 36.5 (22 @ 04:39), Max: 37 (22 @ 00:35)  T(F): 97.7 (22 @ 04:39), Max: 98.6 (22 @ 00:35)  HR: 69 (22 @ 04:39) (60 - 69)  BP: 119/72 (22 @ 04:39) (119/72 - 120/77)  RR: 18 (22 @ 04:39) (18 - 18)  SpO2: 96% (22 @ 04:39) (96% - 97%)                   Daily     Daily Weight in k.9 (2022 04:39)      Bilirubin Total, Serum: 0.3 mg/dL ( @ 06:50)    CAPILLARY BLOOD GLUCO                    PHYSICAL EXAM  s   No chest pain  No palpatations"  Neurology: alert and oriented x 3, moves all extremities with no defecits  CV :  RRR  Lungs:   CTA B/L  Abdomen: soft, nontender, nondistended, positive bowel sounds  Extremities:     no edema   no calf tenderness

## 2022-02-12 PROCEDURE — 99232 SBSQ HOSP IP/OBS MODERATE 35: CPT

## 2022-02-12 RX ORDER — FERROUS SULFATE 325(65) MG
325 TABLET ORAL THREE TIMES A DAY
Refills: 0 | Status: DISCONTINUED | OUTPATIENT
Start: 2022-02-12 | End: 2022-02-25

## 2022-02-12 RX ADMIN — ESCITALOPRAM OXALATE 10 MILLIGRAM(S): 10 TABLET, FILM COATED ORAL at 10:09

## 2022-02-12 RX ADMIN — Medication 1 MILLIGRAM(S): at 11:59

## 2022-02-12 RX ADMIN — BUPROPION HYDROCHLORIDE 300 MILLIGRAM(S): 150 TABLET, EXTENDED RELEASE ORAL at 10:09

## 2022-02-12 RX ADMIN — PANTOPRAZOLE SODIUM 40 MILLIGRAM(S): 20 TABLET, DELAYED RELEASE ORAL at 06:13

## 2022-02-12 RX ADMIN — Medication 325 MILLIGRAM(S): at 10:09

## 2022-02-12 RX ADMIN — ATORVASTATIN CALCIUM 80 MILLIGRAM(S): 80 TABLET, FILM COATED ORAL at 21:00

## 2022-02-12 RX ADMIN — CARVEDILOL PHOSPHATE 3.12 MILLIGRAM(S): 80 CAPSULE, EXTENDED RELEASE ORAL at 17:38

## 2022-02-12 RX ADMIN — Medication 1 TABLET(S): at 10:09

## 2022-02-12 RX ADMIN — SODIUM CHLORIDE 3 MILLILITER(S): 9 INJECTION INTRAMUSCULAR; INTRAVENOUS; SUBCUTANEOUS at 06:23

## 2022-02-12 RX ADMIN — SODIUM CHLORIDE 3 MILLILITER(S): 9 INJECTION INTRAMUSCULAR; INTRAVENOUS; SUBCUTANEOUS at 21:00

## 2022-02-12 RX ADMIN — Medication 325 MILLIGRAM(S): at 11:59

## 2022-02-12 RX ADMIN — SODIUM CHLORIDE 3 MILLILITER(S): 9 INJECTION INTRAMUSCULAR; INTRAVENOUS; SUBCUTANEOUS at 14:43

## 2022-02-12 RX ADMIN — CARVEDILOL PHOSPHATE 3.12 MILLIGRAM(S): 80 CAPSULE, EXTENDED RELEASE ORAL at 06:13

## 2022-02-12 RX ADMIN — Medication 325 MILLIGRAM(S): at 21:00

## 2022-02-12 RX ADMIN — Medication 500 MILLIGRAM(S): at 10:09

## 2022-02-12 NOTE — PROGRESS NOTE ADULT - PROBLEM SELECTOR PLAN 1
ator   BB   and asa  minimal blood draws as pt is a jehovah witness,  is on iron    folic acis and vitamin C    OR date to be determined with Dr Aaron   for next week continue preop work up   continue asa/ bb/ statin   shower qd  ck carotids/ pft's  continue iron/ folate/ vit c/ epogen qweek  limited blood draws- pt jehovah witness  ?OR date; d/w Dr. Aaron

## 2022-02-12 NOTE — PROGRESS NOTE ADULT - ASSESSMENT
Pt is 62 y/o Mormon M w/ PMH depression, HLD, HTN, CAD s/p stents (2015 Jan [facility], June [facility]).   Presented to [facility] for CP/SOB, further workup output revealed stenosis of prior stents/ CAD  2/11   no chest pain   on coreq,  asa   ator       Pt is 60 y/o Sikhism M w/ PMH depression, HLD, HTN, CAD s/p stents (2015 Jan [facility], June [facility]).   Presented to [facility] for CP/SOB, further workup output revealed stenosis of prior stents/ CAD  2/11   no chest pain   on coreq,  asa   ator  2/12 VSS: continue current medication asa/ statin/ bb; continue epogen and iron  ? OR date - d/w Dr. Aaron

## 2022-02-12 NOTE — PROGRESS NOTE ADULT - SUBJECTIVE AND OBJECTIVE BOX
VITAL SIGNS    Telemetry:    Vital Signs Last 24 Hrs  T(C): 36.5 (22 @ 05:05), Max: 36.8 (22 @ 12:54)  T(F): 97.7 (22 @ 05:05), Max: 98.3 (22 @ 12:54)  HR: 69 (22 @ 05:05) (64 - 69)  BP: 120/66 (22 @ 05:05) (118/70 - 124/72)  RR: 18 (22 @ 05:05) (18 - 18)  SpO2: 95% (22 @ 05:05) (94% - 96%)             @ 07:01  -   @ 07:00  --------------------------------------------------------  IN: 970 mL / OUT: 400 mL / NET: 570 mL     @ 07:01  -   @ 10:23  --------------------------------------------------------  IN: 240 mL / OUT: 400 mL / NET: -160 mL       Daily     Daily Weight in k.3 (2022 07:22)  Admit Wt: Drug Dosing Weight  Height (cm): 182.8 (2022 02:09)  Weight (kg): 97.5 (2022 02:09)  BMI (kg/m2): 29.2 (2022 02:09)  BSA (m2): 2.2 (2022 02:09)      CAPILLARY BLOOD GLUCOSE              ascorbic acid 500 milliGRAM(s) Oral daily  aspirin enteric coated 325 milliGRAM(s) Oral daily  atorvastatin 80 milliGRAM(s) Oral at bedtime  buPROPion XL (24-Hour) . 300 milliGRAM(s) Oral daily  carvedilol 3.125 milliGRAM(s) Oral every 12 hours  epoetin andrew-epbx (RETACRIT) Injectable 56434 Unit(s) SubCutaneous every 7 days  escitalopram 10 milliGRAM(s) Oral daily  ferrous    sulfate 325 milliGRAM(s) Oral three times a day  folic acid 1 milliGRAM(s) Oral daily  influenza   Vaccine 0.5 milliLiter(s) IntraMuscular once  multivitamin 1 Tablet(s) Oral daily  pantoprazole    Tablet 40 milliGRAM(s) Oral before breakfast  sodium chloride 0.9% lock flush 3 milliLiter(s) IV Push every 8 hours      PHYSICAL EXAM    Subjective: "Hi.   Neurology: alert and oriented x 3, nonfocal, no gross deficits  CV : tele:  RSR  Sternal Wound :  CDI with dressing , Stable  Lungs: clear. RR easy, unlabored   Abdomen: soft, nontender, nondistended, positive bowel sounds, bowel movement   Neg N/V/D   :  pt voiding without difficulty   Extremities:   TOLEDO; edema, neg calf tenderness.   PPP bilaterally      PW:  Chest tubes:                 VITAL SIGNS    Telemetry:  rsr 60-70   Vital Signs Last 24 Hrs  T(C): 36.5 (22 @ 05:05), Max: 36.8 (22 @ 12:54)  T(F): 97.7 (22 @ 05:05), Max: 98.3 (22 @ 12:54)  HR: 69 (22 @ 05:05) (64 - 69)  BP: 120/66 (22 @ 05:05) (118/70 - 124/72)  RR: 18 (22 @ 05:05) (18 - 18)  SpO2: 95% (22 @ 05:05) (94% - 96%)             07:01  -   @ 07:00  --------------------------------------------------------  IN: 970 mL / OUT: 400 mL / NET: 570 mL     @ 07:01  -   @ 10:23  --------------------------------------------------------  IN: 240 mL / OUT: 400 mL / NET: -160 mL       Daily     Daily Weight in k.3 (2022 07:22)  Admit Wt: Drug Dosing Weight  Height (cm): 182.8 (2022 02:09)  Weight (kg): 97.5 (2022 02:09)  BMI (kg/m2): 29.2 (2022 02:09)  BSA (m2): 2.2 (2022 02:09)        ascorbic acid 500 milliGRAM(s) Oral daily  aspirin enteric coated 325 milliGRAM(s) Oral daily  atorvastatin 80 milliGRAM(s) Oral at bedtime  buPROPion XL (24-Hour) . 300 milliGRAM(s) Oral daily  carvedilol 3.125 milliGRAM(s) Oral every 12 hours  epoetin andrew-epbx (RETACRIT) Injectable 88055 Unit(s) SubCutaneous every 7 days  escitalopram 10 milliGRAM(s) Oral daily  ferrous    sulfate 325 milliGRAM(s) Oral three times a day  folic acid 1 milliGRAM(s) Oral daily  influenza   Vaccine 0.5 milliLiter(s) IntraMuscular once  multivitamin 1 Tablet(s) Oral daily  pantoprazole    Tablet 40 milliGRAM(s) Oral before breakfast  sodium chloride 0.9% lock flush 3 milliLiter(s) IV Push every 8 hours      PHYSICAL EXAM    Subjective: "When is my surgery?"   Neurology: alert and oriented x 3, nonfocal, no gross deficits  CV : tele:  RSR 60-70   Lungs: clear. RR easy, unlabored   Abdomen: soft, nontender, nondistended, positive bowel sounds, + bowel movement   Neg N/V/D   :  pt voiding without difficulty   Extremities:   TOLEDO; neg LE edema, neg calf tenderness.   PPP bilaterally; rt radial site cdi w/ dressing- neg hematoma/ bleeding

## 2022-02-13 PROCEDURE — 99232 SBSQ HOSP IP/OBS MODERATE 35: CPT

## 2022-02-13 RX ADMIN — PANTOPRAZOLE SODIUM 40 MILLIGRAM(S): 20 TABLET, DELAYED RELEASE ORAL at 06:07

## 2022-02-13 RX ADMIN — SODIUM CHLORIDE 3 MILLILITER(S): 9 INJECTION INTRAMUSCULAR; INTRAVENOUS; SUBCUTANEOUS at 14:43

## 2022-02-13 RX ADMIN — Medication 325 MILLIGRAM(S): at 21:04

## 2022-02-13 RX ADMIN — Medication 500 MILLIGRAM(S): at 11:23

## 2022-02-13 RX ADMIN — Medication 1 MILLIGRAM(S): at 11:22

## 2022-02-13 RX ADMIN — SODIUM CHLORIDE 3 MILLILITER(S): 9 INJECTION INTRAMUSCULAR; INTRAVENOUS; SUBCUTANEOUS at 05:00

## 2022-02-13 RX ADMIN — CARVEDILOL PHOSPHATE 3.12 MILLIGRAM(S): 80 CAPSULE, EXTENDED RELEASE ORAL at 17:16

## 2022-02-13 RX ADMIN — Medication 325 MILLIGRAM(S): at 13:21

## 2022-02-13 RX ADMIN — BUPROPION HYDROCHLORIDE 300 MILLIGRAM(S): 150 TABLET, EXTENDED RELEASE ORAL at 11:23

## 2022-02-13 RX ADMIN — CARVEDILOL PHOSPHATE 3.12 MILLIGRAM(S): 80 CAPSULE, EXTENDED RELEASE ORAL at 05:35

## 2022-02-13 RX ADMIN — Medication 325 MILLIGRAM(S): at 11:23

## 2022-02-13 RX ADMIN — Medication 325 MILLIGRAM(S): at 05:35

## 2022-02-13 RX ADMIN — ESCITALOPRAM OXALATE 10 MILLIGRAM(S): 10 TABLET, FILM COATED ORAL at 11:23

## 2022-02-13 RX ADMIN — SODIUM CHLORIDE 3 MILLILITER(S): 9 INJECTION INTRAMUSCULAR; INTRAVENOUS; SUBCUTANEOUS at 21:05

## 2022-02-13 RX ADMIN — Medication 1 TABLET(S): at 11:22

## 2022-02-13 RX ADMIN — ATORVASTATIN CALCIUM 80 MILLIGRAM(S): 80 TABLET, FILM COATED ORAL at 21:03

## 2022-02-13 NOTE — PROGRESS NOTE ADULT - ASSESSMENT
Pt is 62 y/o Lutheran M w/ PMH depression, HLD, HTN, CAD s/p stents (2015 Jan [facility], June [place]).   Presented to [facility] for CP/SOB, further workup output revealed stenosis of prior stents/ CAD  2/11   no chest pain   on coreq,  asa   ator  2/12 VSS: continue current medication asa/ statin/ bb; continue epogen and iron  2/13 VSS - rounds made w/ DR. Skaggs.  Dr. Aaron to see pt in am - no OR date as of yet

## 2022-02-13 NOTE — PROGRESS NOTE ADULT - SUBJECTIVE AND OBJECTIVE BOX
VITAL SIGNS-Telemetry:  SR 60-80  Vital Signs Last 24 Hrs  T(C): 36.6 (02-13-22 @ 11:20), Max: 36.6 (02-12-22 @ 19:17)  T(F): 97.9 (02-13-22 @ 11:20), Max: 97.9 (02-12-22 @ 19:17)  HR: 74 (02-13-22 @ 11:20) (57 - 82)  BP: 123/85 (02-13-22 @ 11:20) (112/70 - 131/76)  RR: 18 (02-13-22 @ 11:20) (18 - 18)  SpO2: 97% (02-13-22 @ 11:20) (95% - 97%)         02-12 @ 07:01  -  02-13 @ 07:00  --------------------------------------------------------  IN: 1080 mL / OUT: 550 mL / NET: 530 mL    Daily     Daily     Neurology: alert and oriented x 3, nonfocal, no gross deficits  CV : S1S2  Lungs: cta  Abdomen: soft, nontender, nondistended, positive bowel sounds, last bowel movement      +bm   Extremities:     no c/c/e    ascorbic acid 500 milliGRAM(s) Oral daily  aspirin enteric coated 325 milliGRAM(s) Oral daily  atorvastatin 80 milliGRAM(s) Oral at bedtime  buPROPion XL (24-Hour) . 300 milliGRAM(s) Oral daily  carvedilol 3.125 milliGRAM(s) Oral every 12 hours  epoetin andrew-epbx (RETACRIT) Injectable 47973 Unit(s) SubCutaneous every 7 days  escitalopram 10 milliGRAM(s) Oral daily  ferrous    sulfate 325 milliGRAM(s) Oral three times a day  folic acid 1 milliGRAM(s) Oral daily  influenza   Vaccine 0.5 milliLiter(s) IntraMuscular once  multivitamin 1 Tablet(s) Oral daily  pantoprazole    Tablet 40 milliGRAM(s) Oral before breakfast  sodium chloride 0.9% lock flush 3 milliLiter(s) IV Push every 8 hours    Physical Therapy Rec:   Home  [ x ]   Home w/ PT  [  ]  Rehab  [  ]  Discussed with Cardiothoracic Team at AM rounds.

## 2022-02-13 NOTE — PROGRESS NOTE ADULT - PROBLEM SELECTOR PLAN 1
continue preop work up   continue asa/ bb/ statin   shower qd  ck carotids/ pft's  continue iron/ folate/ vit c/ epogen qweek  limited blood draws- pt jehovah witness  ?OR date; d/w Dr. Aaron

## 2022-02-14 ENCOUNTER — APPOINTMENT (OUTPATIENT)
Dept: CARDIOLOGY | Facility: CLINIC | Age: 62
End: 2022-02-14

## 2022-02-14 LAB — LIDOCAIN SERPL-MCNC: 32.7 NMOL/L — SIGNIFICANT CHANGE UP

## 2022-02-14 PROCEDURE — 94010 BREATHING CAPACITY TEST: CPT | Mod: 26

## 2022-02-14 PROCEDURE — 99232 SBSQ HOSP IP/OBS MODERATE 35: CPT

## 2022-02-14 RX ADMIN — Medication 325 MILLIGRAM(S): at 14:09

## 2022-02-14 RX ADMIN — ESCITALOPRAM OXALATE 10 MILLIGRAM(S): 10 TABLET, FILM COATED ORAL at 14:10

## 2022-02-14 RX ADMIN — Medication 1 MILLIGRAM(S): at 14:09

## 2022-02-14 RX ADMIN — Medication 325 MILLIGRAM(S): at 21:24

## 2022-02-14 RX ADMIN — SODIUM CHLORIDE 3 MILLILITER(S): 9 INJECTION INTRAMUSCULAR; INTRAVENOUS; SUBCUTANEOUS at 22:45

## 2022-02-14 RX ADMIN — CARVEDILOL PHOSPHATE 3.12 MILLIGRAM(S): 80 CAPSULE, EXTENDED RELEASE ORAL at 17:19

## 2022-02-14 RX ADMIN — SODIUM CHLORIDE 3 MILLILITER(S): 9 INJECTION INTRAMUSCULAR; INTRAVENOUS; SUBCUTANEOUS at 14:13

## 2022-02-14 RX ADMIN — SODIUM CHLORIDE 3 MILLILITER(S): 9 INJECTION INTRAMUSCULAR; INTRAVENOUS; SUBCUTANEOUS at 05:35

## 2022-02-14 RX ADMIN — PANTOPRAZOLE SODIUM 40 MILLIGRAM(S): 20 TABLET, DELAYED RELEASE ORAL at 05:44

## 2022-02-14 RX ADMIN — BUPROPION HYDROCHLORIDE 300 MILLIGRAM(S): 150 TABLET, EXTENDED RELEASE ORAL at 14:09

## 2022-02-14 RX ADMIN — ATORVASTATIN CALCIUM 80 MILLIGRAM(S): 80 TABLET, FILM COATED ORAL at 21:24

## 2022-02-14 RX ADMIN — Medication 325 MILLIGRAM(S): at 05:43

## 2022-02-14 RX ADMIN — Medication 500 MILLIGRAM(S): at 14:09

## 2022-02-14 RX ADMIN — Medication 1 TABLET(S): at 14:09

## 2022-02-14 RX ADMIN — CARVEDILOL PHOSPHATE 3.12 MILLIGRAM(S): 80 CAPSULE, EXTENDED RELEASE ORAL at 05:44

## 2022-02-14 NOTE — PROGRESS NOTE ADULT - ASSESSMENT
Pt is 60 y/o Anabaptist M w/ PMH depression, HLD, HTN, CAD s/p stents (2015 Jan [facility], June [place]).   Presented to [facility] for CP/SOB, further workup output revealed stenosis of prior stents/ CAD  2/11   no chest pain   on coreq,  asa   ator  2/12 VSS: continue current medication asa/ statin/ bb; continue epogen and iron  2/13 VSS - rounds made w/ DR. Skaggs.  Dr. Aaron to see pt in am - no OR date as of yet     Pt is 60 y/o Protestant M w/ PMH depression, HLD, HTN, CAD s/p stents (2015 Jan [facility], [place]).   Presented to [facility] for CP/SOB, further workup output revealed stenosis of prior stents/ CAD  2/11   no chest pain   on coreq,  asa   ator  2/12 VSS: continue current medication asa/ statin/ bb; continue epogen and iron  2/13 VSS - rounds made w/ DR. Skaggs.  Dr. Aaron to see pt in am - no OR date as of yet  2/14 VSS; carotids neg sig stenosis; echo minimal MR/ neg AR ef 67%; continue asa/ statin/ bb; continue epogen   ? OR date- d/w DR. Aaron; continue to minimize bloodwork

## 2022-02-14 NOTE — PROGRESS NOTE ADULT - PROBLEM SELECTOR PLAN 1
continue preop work up   continue asa/ bb/ statin   shower qd  ck carotids/ pft's  continue iron/ folate/ vit c/ epogen qweek  limited blood draws- pt jehovah witness  ?OR date; d/w Dr. Aaron continue preop work up   continue asa/ bb/ statin   shower qd  continue iron/ folate/ vit c/ epogen qweek  limited blood draws- pt jehovah witness  ?OR date; d/w Dr. Aaron

## 2022-02-14 NOTE — PROGRESS NOTE ADULT - SUBJECTIVE AND OBJECTIVE BOX
VITAL SIGNS    Telemetry:    Vital Signs Last 24 Hrs  T(C): 36.6 (02-14-22 @ 04:35), Max: 36.7 (02-13-22 @ 19:40)  T(F): 97.8 (02-14-22 @ 04:35), Max: 98.1 (02-13-22 @ 19:40)  HR: 69 (02-14-22 @ 05:44) (61 - 90)  BP: 115/75 (02-14-22 @ 05:44) (101/55 - 123/85)  RR: 17 (02-14-22 @ 04:35) (16 - 18)  SpO2: 96% (02-14-22 @ 04:35) (96% - 98%)            02-13 @ 07:01  -  02-14 @ 07:00  --------------------------------------------------------  IN: 530 mL / OUT: 950 mL / NET: -420 mL    02-14 @ 07:01  -  02-14 @ 10:12  --------------------------------------------------------  IN: 300 mL / OUT: 0 mL / NET: 300 mL       Daily     Daily   Admit Wt: Drug Dosing Weight  Height (cm): 182.8 (11 Feb 2022 02:09)  Weight (kg): 97.5 (11 Feb 2022 02:09)  BMI (kg/m2): 29.2 (11 Feb 2022 02:09)  BSA (m2): 2.2 (11 Feb 2022 02:09)      CAPILLARY BLOOD GLUCOSE              ascorbic acid 500 milliGRAM(s) Oral daily  aspirin enteric coated 325 milliGRAM(s) Oral daily  atorvastatin 80 milliGRAM(s) Oral at bedtime  buPROPion XL (24-Hour) . 300 milliGRAM(s) Oral daily  carvedilol 3.125 milliGRAM(s) Oral every 12 hours  epoetin andrew-epbx (RETACRIT) Injectable 70232 Unit(s) SubCutaneous every 7 days  escitalopram 10 milliGRAM(s) Oral daily  ferrous    sulfate 325 milliGRAM(s) Oral three times a day  folic acid 1 milliGRAM(s) Oral daily  influenza   Vaccine 0.5 milliLiter(s) IntraMuscular once  multivitamin 1 Tablet(s) Oral daily  pantoprazole    Tablet 40 milliGRAM(s) Oral before breakfast  sodium chloride 0.9% lock flush 3 milliLiter(s) IV Push every 8 hours      PHYSICAL EXAM    Subjective: "Hi.   Neurology: alert and oriented x 3, nonfocal, no gross deficits  CV : tele:  RSR  Sternal Wound :  CDI with dressing , Stable  Lungs: clear. RR easy, unlabored   Abdomen: soft, nontender, nondistended, positive bowel sounds, bowel movement   Neg N/V/D   :  pt voiding without difficulty   Extremities:   TOLEDO; edema, neg calf tenderness.   PPP bilaterally      PW:  Chest tubes:                 VITAL SIGNS    Telemetry:  rsr    Vital Signs Last 24 Hrs  T(C): 36.6 (02-14-22 @ 04:35), Max: 36.7 (02-13-22 @ 19:40)  T(F): 97.8 (02-14-22 @ 04:35), Max: 98.1 (02-13-22 @ 19:40)  HR: 69 (02-14-22 @ 05:44) (61 - 90)  BP: 115/75 (02-14-22 @ 05:44) (101/55 - 123/85)  RR: 17 (02-14-22 @ 04:35) (16 - 18)  SpO2: 96% (02-14-22 @ 04:35) (96% - 98%)            02-13 @ 07:01  -  02-14 @ 07:00  --------------------------------------------------------  IN: 530 mL / OUT: 950 mL / NET: -420 mL    02-14 @ 07:01  -  02-14 @ 10:12  --------------------------------------------------------  IN: 300 mL / OUT: 0 mL / NET: 300 mL       Daily   Admit Wt: Drug Dosing Weight  Height (cm): 182.8 (11 Feb 2022 02:09)  Weight (kg): 97.5 (11 Feb 2022 02:09)  BMI (kg/m2): 29.2 (11 Feb 2022 02:09)  BSA (m2): 2.2 (11 Feb 2022 02:09)            ascorbic acid 500 milliGRAM(s) Oral daily  aspirin enteric coated 325 milliGRAM(s) Oral daily  atorvastatin 80 milliGRAM(s) Oral at bedtime  buPROPion XL (24-Hour) . 300 milliGRAM(s) Oral daily  carvedilol 3.125 milliGRAM(s) Oral every 12 hours  epoetin andrew-epbx (RETACRIT) Injectable 91335 Unit(s) SubCutaneous every 7 days  escitalopram 10 milliGRAM(s) Oral daily  ferrous    sulfate 325 milliGRAM(s) Oral three times a day  folic acid 1 milliGRAM(s) Oral daily  influenza   Vaccine 0.5 milliLiter(s) IntraMuscular once  multivitamin 1 Tablet(s) Oral daily  pantoprazole    Tablet 40 milliGRAM(s) Oral before breakfast  sodium chloride 0.9% lock flush 3 milliLiter(s) IV Push every 8 hours      PHYSICAL EXAM    Subjective: "I don't have chest pain or shortness of breath."  Neurology: alert and oriented x 3, nonfocal, no gross deficits  CV : tele:  RSR   Lungs: clear. RR easy, unlabored   Abdomen: soft, nontender, nondistended, positive bowel sounds, +bowel movement;  Neg N/V/D; obese abdomen    :  pt voiding without difficulty   Extremities:   TOLEDO; neg LE edema, neg calf tenderness.   PPP bilaterally; right radial cath site cdi ivonne- neg hematoma/ bleeding

## 2022-02-15 DIAGNOSIS — I25.10 ATHEROSCLEROTIC HEART DISEASE OF NATIVE CORONARY ARTERY WITHOUT ANGINA PECTORIS: ICD-10-CM

## 2022-02-15 DIAGNOSIS — M79.622 PAIN IN LEFT UPPER ARM: ICD-10-CM

## 2022-02-15 DIAGNOSIS — E78.5 HYPERLIPIDEMIA, UNSPECIFIED: ICD-10-CM

## 2022-02-15 DIAGNOSIS — Z95.5 PRESENCE OF CORONARY ANGIOPLASTY IMPLANT AND GRAFT: ICD-10-CM

## 2022-02-15 DIAGNOSIS — I20.0 UNSTABLE ANGINA: ICD-10-CM

## 2022-02-15 DIAGNOSIS — R94.39 ABNORMAL RESULT OF OTHER CARDIOVASCULAR FUNCTION STUDY: ICD-10-CM

## 2022-02-15 DIAGNOSIS — I25.2 OLD MYOCARDIAL INFARCTION: ICD-10-CM

## 2022-02-15 PROCEDURE — 99232 SBSQ HOSP IP/OBS MODERATE 35: CPT

## 2022-02-15 PROCEDURE — 93010 ELECTROCARDIOGRAM REPORT: CPT

## 2022-02-15 RX ORDER — ISOSORBIDE MONONITRATE 60 MG/1
30 TABLET, EXTENDED RELEASE ORAL DAILY
Refills: 0 | Status: DISCONTINUED | OUTPATIENT
Start: 2022-02-15 | End: 2022-02-25

## 2022-02-15 RX ORDER — ENOXAPARIN SODIUM 100 MG/ML
40 INJECTION SUBCUTANEOUS
Refills: 0 | Status: DISCONTINUED | OUTPATIENT
Start: 2022-02-15 | End: 2022-02-24

## 2022-02-15 RX ORDER — METOPROLOL TARTRATE 50 MG
25 TABLET ORAL EVERY 8 HOURS
Refills: 0 | Status: DISCONTINUED | OUTPATIENT
Start: 2022-02-15 | End: 2022-02-25

## 2022-02-15 RX ORDER — ERYTHROPOIETIN 10000 [IU]/ML
10000 INJECTION, SOLUTION INTRAVENOUS; SUBCUTANEOUS ONCE
Refills: 0 | Status: DISCONTINUED | OUTPATIENT
Start: 2022-02-16 | End: 2022-02-16

## 2022-02-15 RX ADMIN — ENOXAPARIN SODIUM 40 MILLIGRAM(S): 100 INJECTION SUBCUTANEOUS at 19:23

## 2022-02-15 RX ADMIN — Medication 325 MILLIGRAM(S): at 13:15

## 2022-02-15 RX ADMIN — ESCITALOPRAM OXALATE 10 MILLIGRAM(S): 10 TABLET, FILM COATED ORAL at 13:15

## 2022-02-15 RX ADMIN — Medication 25 MILLIGRAM(S): at 19:23

## 2022-02-15 RX ADMIN — PANTOPRAZOLE SODIUM 40 MILLIGRAM(S): 20 TABLET, DELAYED RELEASE ORAL at 05:34

## 2022-02-15 RX ADMIN — Medication 1 TABLET(S): at 13:15

## 2022-02-15 RX ADMIN — Medication 325 MILLIGRAM(S): at 05:34

## 2022-02-15 RX ADMIN — Medication 25 MILLIGRAM(S): at 22:44

## 2022-02-15 RX ADMIN — ATORVASTATIN CALCIUM 80 MILLIGRAM(S): 80 TABLET, FILM COATED ORAL at 22:44

## 2022-02-15 RX ADMIN — Medication 1 MILLIGRAM(S): at 13:16

## 2022-02-15 RX ADMIN — SODIUM CHLORIDE 3 MILLILITER(S): 9 INJECTION INTRAMUSCULAR; INTRAVENOUS; SUBCUTANEOUS at 06:00

## 2022-02-15 RX ADMIN — BUPROPION HYDROCHLORIDE 300 MILLIGRAM(S): 150 TABLET, EXTENDED RELEASE ORAL at 13:15

## 2022-02-15 RX ADMIN — CARVEDILOL PHOSPHATE 3.12 MILLIGRAM(S): 80 CAPSULE, EXTENDED RELEASE ORAL at 17:08

## 2022-02-15 RX ADMIN — Medication 325 MILLIGRAM(S): at 22:44

## 2022-02-15 RX ADMIN — ISOSORBIDE MONONITRATE 30 MILLIGRAM(S): 60 TABLET, EXTENDED RELEASE ORAL at 22:48

## 2022-02-15 RX ADMIN — SODIUM CHLORIDE 3 MILLILITER(S): 9 INJECTION INTRAMUSCULAR; INTRAVENOUS; SUBCUTANEOUS at 13:00

## 2022-02-15 RX ADMIN — CARVEDILOL PHOSPHATE 3.12 MILLIGRAM(S): 80 CAPSULE, EXTENDED RELEASE ORAL at 05:34

## 2022-02-15 RX ADMIN — Medication 500 MILLIGRAM(S): at 13:15

## 2022-02-15 NOTE — PROGRESS NOTE ADULT - PROBLEM SELECTOR PLAN 1
continue preop work up   continue asa/ bb/ statin   shower qd  continue iron/ folate/ vit c/ epogen qweek  limited blood draws- pt jehovah witness  Await OR date; d/w Dr. Aaron

## 2022-02-15 NOTE — PROGRESS NOTE ADULT - SUBJECTIVE AND OBJECTIVE BOX
Subjective: "Waiting on my operation date, I feel ok, "  Sitting up in bed    Tele:      SR  70s                          T(C): 36.5 (02-15-22 @ 04:20), Max: 36.6 (02-14-22 @ 21:12)  HR: 68 (02-15-22 @ 04:20) (62 - 89)  BP: 119/73 (02-15-22 @ 04:20) (119/73 - 130/84)  RR: 18 (02-15-22 @ 04:20) (18 - 18)  SpO2: 95% (02-15-22 @ 04:20) (95% - 98%)          Assessment    Neurology: alert and oriented x 3, nonfocal, no gross deficits  CV : tS1S2  RRR  Lungs: clear. RR easy, unlabored   Abdomen: soft, nontender, nondistended, positive bowel sounds, +bowel movement;   :  pt voiding without difficulty   Extremities:   TOLEDO; neg LE edema, neg calf tenderness.     PPP bilaterally; right radial cath site cdi ivonne- neg hematoma/ bleeding           MEDICATIONS  (STANDING):  ascorbic acid 500 milliGRAM(s) Oral daily  aspirin enteric coated 325 milliGRAM(s) Oral daily  atorvastatin 80 milliGRAM(s) Oral at bedtime  buPROPion XL (24-Hour) . 300 milliGRAM(s) Oral daily  carvedilol 3.125 milliGRAM(s) Oral every 12 hours  epoetin andrew-epbx (RETACRIT) Injectable 50347 Unit(s) SubCutaneous every 7 days  escitalopram 10 milliGRAM(s) Oral daily  ferrous    sulfate 325 milliGRAM(s) Oral three times a day  folic acid 1 milliGRAM(s) Oral daily  influenza   Vaccine 0.5 milliLiter(s) IntraMuscular once  multivitamin 1 Tablet(s) Oral daily  pantoprazole    Tablet 40 milliGRAM(s) Oral before breakfast  sodium chloride 0.9% lock flush 3 milliLiter(s) IV Push every 8 hours       PAST MEDICAL & SURGICAL HISTORY:  Anxiety and depression    Hyperlipidemia    Hypertension    CAD (coronary artery disease)    History of appendectomy    S/P cholecystectomy    S/P inguinal hernia repair

## 2022-02-15 NOTE — PROGRESS NOTE ADULT - ASSESSMENT
60 y/o Anglican M w/ PMH depression, HLD, HTN, CAD s/p stents (2015 Jan [facility], June [place]).   Presented to [facility] for CP/SOB, further workup output revealed stenosis of prior stents/ CAD  2/11   no chest pain   on coreq,  asa   ator  2/12 VSS: continue current medication asa/ statin/ bb; continue epogen and iron  2/13 VSS - rounds made w/ DR. Skaggs.  Dr. Aaron to see pt in am - no OR date as of yet  2/14 VSS; carotids neg sig stenosis; echo minimal MR/ neg AR ef 67%; continue asa/ statin/ bb; continue epogen   2/15 VSS Reports L chest "pressure" intermittently w/o radiation  several times daily  4/10  Await OR date- d/w DR. Aaron; continue to minimize bloodwork

## 2022-02-16 PROCEDURE — 99231 SBSQ HOSP IP/OBS SF/LOW 25: CPT

## 2022-02-16 RX ORDER — IRON SUCROSE 20 MG/ML
200 INJECTION, SOLUTION INTRAVENOUS EVERY 24 HOURS
Refills: 0 | Status: COMPLETED | OUTPATIENT
Start: 2022-02-16 | End: 2022-02-18

## 2022-02-16 RX ORDER — ERYTHROPOIETIN 10000 [IU]/ML
15000 INJECTION, SOLUTION INTRAVENOUS; SUBCUTANEOUS
Refills: 0 | Status: DISCONTINUED | OUTPATIENT
Start: 2022-02-16 | End: 2022-02-16

## 2022-02-16 RX ORDER — ERYTHROPOIETIN 10000 [IU]/ML
15000 INJECTION, SOLUTION INTRAVENOUS; SUBCUTANEOUS
Refills: 0 | Status: DISCONTINUED | OUTPATIENT
Start: 2022-02-16 | End: 2022-02-25

## 2022-02-16 RX ADMIN — Medication 325 MILLIGRAM(S): at 22:13

## 2022-02-16 RX ADMIN — ENOXAPARIN SODIUM 40 MILLIGRAM(S): 100 INJECTION SUBCUTANEOUS at 05:49

## 2022-02-16 RX ADMIN — Medication 25 MILLIGRAM(S): at 06:48

## 2022-02-16 RX ADMIN — Medication 325 MILLIGRAM(S): at 13:27

## 2022-02-16 RX ADMIN — ESCITALOPRAM OXALATE 10 MILLIGRAM(S): 10 TABLET, FILM COATED ORAL at 13:26

## 2022-02-16 RX ADMIN — IRON SUCROSE 110 MILLIGRAM(S): 20 INJECTION, SOLUTION INTRAVENOUS at 20:37

## 2022-02-16 RX ADMIN — ATORVASTATIN CALCIUM 80 MILLIGRAM(S): 80 TABLET, FILM COATED ORAL at 21:53

## 2022-02-16 RX ADMIN — Medication 325 MILLIGRAM(S): at 05:48

## 2022-02-16 RX ADMIN — Medication 325 MILLIGRAM(S): at 13:25

## 2022-02-16 RX ADMIN — ENOXAPARIN SODIUM 40 MILLIGRAM(S): 100 INJECTION SUBCUTANEOUS at 17:20

## 2022-02-16 RX ADMIN — Medication 500 MILLIGRAM(S): at 13:26

## 2022-02-16 RX ADMIN — Medication 25 MILLIGRAM(S): at 21:54

## 2022-02-16 RX ADMIN — ISOSORBIDE MONONITRATE 30 MILLIGRAM(S): 60 TABLET, EXTENDED RELEASE ORAL at 13:26

## 2022-02-16 RX ADMIN — ERYTHROPOIETIN 15000 UNIT(S): 10000 INJECTION, SOLUTION INTRAVENOUS; SUBCUTANEOUS at 15:39

## 2022-02-16 RX ADMIN — SODIUM CHLORIDE 3 MILLILITER(S): 9 INJECTION INTRAMUSCULAR; INTRAVENOUS; SUBCUTANEOUS at 07:24

## 2022-02-16 RX ADMIN — SODIUM CHLORIDE 3 MILLILITER(S): 9 INJECTION INTRAMUSCULAR; INTRAVENOUS; SUBCUTANEOUS at 05:14

## 2022-02-16 RX ADMIN — Medication 25 MILLIGRAM(S): at 13:28

## 2022-02-16 RX ADMIN — Medication 1 TABLET(S): at 13:26

## 2022-02-16 RX ADMIN — SODIUM CHLORIDE 3 MILLILITER(S): 9 INJECTION INTRAMUSCULAR; INTRAVENOUS; SUBCUTANEOUS at 13:28

## 2022-02-16 RX ADMIN — PANTOPRAZOLE SODIUM 40 MILLIGRAM(S): 20 TABLET, DELAYED RELEASE ORAL at 06:48

## 2022-02-16 RX ADMIN — Medication 1 MILLIGRAM(S): at 13:25

## 2022-02-16 RX ADMIN — BUPROPION HYDROCHLORIDE 300 MILLIGRAM(S): 150 TABLET, EXTENDED RELEASE ORAL at 13:29

## 2022-02-16 RX ADMIN — SODIUM CHLORIDE 3 MILLILITER(S): 9 INJECTION INTRAMUSCULAR; INTRAVENOUS; SUBCUTANEOUS at 21:42

## 2022-02-16 NOTE — PROGRESS NOTE ADULT - SUBJECTIVE AND OBJECTIVE BOX
VITAL SIGNS    Telemetry:  nsr  50-60    Vital Signs Last 24 Hrs  T(C): 36.6 (02-16-22 @ 04:45), Max: 36.8 (02-15-22 @ 14:04)  T(F): 97.8 (02-16-22 @ 04:45), Max: 98.2 (02-15-22 @ 14:04)  HR: 56 (02-16-22 @ 04:45) (56 - 82)  BP: 99/58 (02-16-22 @ 04:45) (99/58 - 136/87)  RR: 18 (02-16-22 @ 04:45) (18 - 18)  SpO2: 92% (02-16-22 @ 04:45) (92% - 99%)                   02-15 @ 07:01  -  02-16 @ 07:00  --------------------------------------------------------  IN: 780 mL / OUT: 300 mL / NET: 480 mL          Daily     Daily             CAPILLARY BLOOD GLUCOSE                  Coumadin    [ ] YES          [x  ]      NO                                   PHYSICAL EXAM        Neurology: alert and oriented x 3, nonfocal, no gross deficits  CV : s1 s2 RRR    Lungs: cta  Abdomen: soft, nontender, nondistended, positive bowel sounds, last bowel movement                    :    voiding      Extremities:   -   edema   /  -   calve tenderness ,            ascorbic acid 500 milliGRAM(s) Oral daily  aspirin enteric coated 325 milliGRAM(s) Oral daily  atorvastatin 80 milliGRAM(s) Oral at bedtime  buPROPion XL (24-Hour) . 300 milliGRAM(s) Oral daily  enoxaparin Injectable 40 milliGRAM(s) SubCutaneous two times a day  epoetin andrew-epbx (RETACRIT) Injectable 01325 Unit(s) IV Push once  epoetin andrew-epbx (RETACRIT) Injectable 76558 Unit(s) SubCutaneous every 7 days  escitalopram 10 milliGRAM(s) Oral daily  ferrous    sulfate 325 milliGRAM(s) Oral three times a day  folic acid 1 milliGRAM(s) Oral daily  influenza   Vaccine 0.5 milliLiter(s) IntraMuscular once  isosorbide   mononitrate ER Tablet (IMDUR) 30 milliGRAM(s) Oral daily  metoprolol tartrate 25 milliGRAM(s) Oral every 8 hours  multivitamin 1 Tablet(s) Oral daily  pantoprazole    Tablet 40 milliGRAM(s) Oral before breakfast  sodium chloride 0.9% lock flush 3 milliLiter(s) IV Push every 8 hours                    Physical Therapy Rec:   Home  [  ]   Home w/ PT  [  ]  Rehab  [  ]  Discussed with Cardiothoracic Team at AM rounds.

## 2022-02-16 NOTE — PROGRESS NOTE ADULT - ASSESSMENT
60 y/o Anglican M w/ PMH depression, HLD, HTN, CAD s/p stents (2015 Jan U, June [place]).   Presented to [facility] for CP/SOB, further workup output revealed stenosis of prior stents/ CAD  2/11   no chest pain   on coreq,  asa   ator  2/12 VSS: continue current medication asa/ statin/ bb; continue epogen and iron  2/13 VSS - rounds made w/ DR. Skaggs.  Dr. Aaron to see pt in am - no OR date as of yet  2/14 VSS; carotids neg sig stenosis; echo minimal MR/ neg AR ef 67%; continue asa/ statin/ bb; continue epogen   2/15 VSS Reports L chest "pressure" intermittently w/o radiation  several times daily  4/10  Await OR date- d/w DR. Aaron; continue to minimize bloodwork   2/16  Increase retacrit  3 x's weekly.  Rad artery sxac.  Urology  consult for h/o retention

## 2022-02-17 DIAGNOSIS — I10 ESSENTIAL (PRIMARY) HYPERTENSION: ICD-10-CM

## 2022-02-17 DIAGNOSIS — R07.82 INTERCOSTAL PAIN: ICD-10-CM

## 2022-02-17 DIAGNOSIS — Z87.898 PERSONAL HISTORY OF OTHER SPECIFIED CONDITIONS: ICD-10-CM

## 2022-02-17 DIAGNOSIS — E78.2 MIXED HYPERLIPIDEMIA: ICD-10-CM

## 2022-02-17 PROCEDURE — 99232 SBSQ HOSP IP/OBS MODERATE 35: CPT

## 2022-02-17 RX ORDER — TAMSULOSIN HYDROCHLORIDE 0.4 MG/1
0.4 CAPSULE ORAL AT BEDTIME
Refills: 0 | Status: DISCONTINUED | OUTPATIENT
Start: 2022-02-17 | End: 2022-02-25

## 2022-02-17 RX ADMIN — SODIUM CHLORIDE 3 MILLILITER(S): 9 INJECTION INTRAMUSCULAR; INTRAVENOUS; SUBCUTANEOUS at 06:11

## 2022-02-17 RX ADMIN — BUPROPION HYDROCHLORIDE 300 MILLIGRAM(S): 150 TABLET, EXTENDED RELEASE ORAL at 13:28

## 2022-02-17 RX ADMIN — Medication 325 MILLIGRAM(S): at 22:06

## 2022-02-17 RX ADMIN — ISOSORBIDE MONONITRATE 30 MILLIGRAM(S): 60 TABLET, EXTENDED RELEASE ORAL at 13:29

## 2022-02-17 RX ADMIN — Medication 25 MILLIGRAM(S): at 06:18

## 2022-02-17 RX ADMIN — Medication 1 TABLET(S): at 13:28

## 2022-02-17 RX ADMIN — Medication 325 MILLIGRAM(S): at 06:17

## 2022-02-17 RX ADMIN — TAMSULOSIN HYDROCHLORIDE 0.4 MILLIGRAM(S): 0.4 CAPSULE ORAL at 22:05

## 2022-02-17 RX ADMIN — Medication 25 MILLIGRAM(S): at 22:06

## 2022-02-17 RX ADMIN — PANTOPRAZOLE SODIUM 40 MILLIGRAM(S): 20 TABLET, DELAYED RELEASE ORAL at 06:17

## 2022-02-17 RX ADMIN — IRON SUCROSE 110 MILLIGRAM(S): 20 INJECTION, SOLUTION INTRAVENOUS at 23:41

## 2022-02-17 RX ADMIN — ENOXAPARIN SODIUM 40 MILLIGRAM(S): 100 INJECTION SUBCUTANEOUS at 06:17

## 2022-02-17 RX ADMIN — ENOXAPARIN SODIUM 40 MILLIGRAM(S): 100 INJECTION SUBCUTANEOUS at 17:41

## 2022-02-17 RX ADMIN — Medication 325 MILLIGRAM(S): at 13:29

## 2022-02-17 RX ADMIN — Medication 1 MILLIGRAM(S): at 13:28

## 2022-02-17 RX ADMIN — Medication 25 MILLIGRAM(S): at 13:29

## 2022-02-17 RX ADMIN — SODIUM CHLORIDE 3 MILLILITER(S): 9 INJECTION INTRAMUSCULAR; INTRAVENOUS; SUBCUTANEOUS at 13:48

## 2022-02-17 RX ADMIN — Medication 325 MILLIGRAM(S): at 13:28

## 2022-02-17 RX ADMIN — SODIUM CHLORIDE 3 MILLILITER(S): 9 INJECTION INTRAMUSCULAR; INTRAVENOUS; SUBCUTANEOUS at 21:48

## 2022-02-17 RX ADMIN — ESCITALOPRAM OXALATE 10 MILLIGRAM(S): 10 TABLET, FILM COATED ORAL at 13:28

## 2022-02-17 RX ADMIN — ATORVASTATIN CALCIUM 80 MILLIGRAM(S): 80 TABLET, FILM COATED ORAL at 22:06

## 2022-02-17 RX ADMIN — Medication 500 MILLIGRAM(S): at 13:28

## 2022-02-17 NOTE — PROGRESS NOTE ADULT - PROBLEM SELECTOR PLAN 1
continue preop work up   continue asa/ bb/ statin   shower qd  continue iron/ folate/ vit c/ epogen qweek  limited blood draws- pt jehovah witness  Await OR date; d/w Dr. Aaron continue preop work up   continue asa/ bb/ statin   shower qd  continue iron/ folate/ vit c/ epogen week  limited blood draws- pt jehovah witness  Await OR date; d/w Dr. Aaron

## 2022-02-17 NOTE — CONSULT NOTE ADULT - ASSESSMENT
patient with history of prostatitis, bph complicated by post op retention.  he currently has minimal symptoms but concerned with post op retention  flomax daily  persaud until oob and ambulating post op  post op bowel regimen and stool softeners  will monitor post void residual post op with possible discharge with persaud if voiding difficulties post op

## 2022-02-17 NOTE — CONSULT NOTE ADULT - SUBJECTIVE AND OBJECTIVE BOX
HPI  Patient had prior episode of acute prostatitis rx with antibiotics and complicated by urinary retention > 5 years ago.  He required intermittent self catheterizations x weeks and had no additional complaints until subsequent cholecystectomy after which he had a short episode of retention requiring post op persaud x days.  He has not seen a Urologist x years and minimal obstructive voiding now awaiting cabg but concerned with post op retention.  He denies recurrent infection and has no dysuria or hematuria.  Patient is a  Bapt[employer] M w/ PMH depression, HLD, HTN, CAD s/p stents (2015 Jan U, June [place], on [place]il[place] in past,  only now), inguinal hernia s/p repair, cholecystectomy (2012), and appendectomy (1970). Per pt on 1/22 began feeling L sided chest pressure and SOB, took an aspirin and felt better. Following day the pain returned and he went to [facility] for evaluation, where per pt serial troponins and EKG were done pt told they were negative and he was sent home. TTE was also done that day with mild hypokinesis of the inferior wall, LVEF 50-55%. His home cardiologist instructed him to go for a stress test, which he did on 2/2/22.   He then went for cardiac cath on 2/10, attempted catheterization of R wrist unsuccessful, cath proceeded in R groin. Pt was told that one of his stents was occluded and another was "down 50%".     Pt transferred to [facility] for CABG awaiting surgery in the coming days    PAST MEDICAL & SURGICAL HISTORY:  Anxiety and depression    Hyperlipidemia    Hypertension    CAD (coronary artery disease)    History of appendectomy    S/P cholecystectomy    S/P inguinal hernia repair    MEDICATIONS  (STANDING):  ascorbic acid 500 milliGRAM(s) Oral daily  aspirin enteric coated 325 milliGRAM(s) Oral daily  atorvastatin 80 milliGRAM(s) Oral at bedtime  buPROPion XL (24-Hour) . 300 milliGRAM(s) Oral daily  enoxaparin Injectable 40 milliGRAM(s) SubCutaneous two times a day  epoetin andrew-epbx (RETACRIT) Injectable 07070 Unit(s) SubCutaneous <User Schedule>  escitalopram 10 milliGRAM(s) Oral daily  ferrous    sulfate 325 milliGRAM(s) Oral three times a day  folic acid 1 milliGRAM(s) Oral daily  influenza   Vaccine 0.5 milliLiter(s) IntraMuscular once  iron sucrose IVPB 200 milliGRAM(s) IV Intermittent every 24 hours  isosorbide   mononitrate ER Tablet (IMDUR) 30 milliGRAM(s) Oral daily  metoprolol tartrate 25 milliGRAM(s) Oral every 8 hours  multivitamin 1 Tablet(s) Oral daily  pantoprazole    Tablet 40 milliGRAM(s) Oral before breakfast  sodium chloride 0.9% lock flush 3 milliLiter(s) IV Push every 8 hours    FAMILY HISTORY: non contributory    Allergies    No Known Allergies    SOCIAL HISTORY: no tobacco etoh    REVIEW OF SYSTEMS:  gen neg  heent neg neck neg  cv per hpi resp neg  gi neg  gu per hpi  msk neg psych neg   all neg endo neg  skin neg neuro neg      Physical Exam  Vital signs  T(C): 36.4 (02-17-22 @ 05:06), Max: 36.5 (02-16-22 @ 13:07)  HR: 60 (02-17-22 @ 06:14)  BP: 121/68 (02-17-22 @ 06:14)  SpO2: 96% (02-17-22 @ 06:14)    Gen:  wdwn male in nad  heent ncat neck symm supple  cor reg check clear  abd soft nd nt no cvat   gu no mass 2+  ext no cce  neuro non focal  psych a and o x 3

## 2022-02-17 NOTE — PROGRESS NOTE ADULT - SUBJECTIVE AND OBJECTIVE BOX
Subjective ' hello I am Ok"    VITAL SIGNS    Telemetry:  NSR 55    Vital Signs Last 24 Hrs  T(C): 36.4 (02-17-22 @ 05:06), Max: 36.5 (02-16-22 @ 13:07)  T(F): 97.6 (02-17-22 @ 05:06), Max: 97.7 (02-16-22 @ 13:07)  HR: 60 (02-17-22 @ 06:14) (60 - 65)  BP: 121/68 (02-17-22 @ 06:14) (100/58 - 124/71)  RR: 18 (02-17-22 @ 06:14) (18 - 18)  SpO2: 96% (02-17-22 @ 06:14) (94% - 96%)           02-16 @ 07:01  -  02-17 @ 07:00  --------------------------------------------------------  IN: 600 mL / OUT: 0 mL / NET: 600 mL      MEDICATIONS  (STANDING):  ascorbic acid 500 milliGRAM(s) Oral daily  aspirin enteric coated 325 milliGRAM(s) Oral daily  atorvastatin 80 milliGRAM(s) Oral at bedtime  buPROPion XL (24-Hour) . 300 milliGRAM(s) Oral daily  enoxaparin Injectable 40 milliGRAM(s) SubCutaneous two times a day  epoetin andrew-epbx (RETACRIT) Injectable 66976 Unit(s) SubCutaneous <User Schedule>  escitalopram 10 milliGRAM(s) Oral daily  ferrous    sulfate 325 milliGRAM(s) Oral three times a day  folic acid 1 milliGRAM(s) Oral daily  influenza   Vaccine 0.5 milliLiter(s) IntraMuscular once  iron sucrose IVPB 200 milliGRAM(s) IV Intermittent every 24 hours  isosorbide   mononitrate ER Tablet (IMDUR) 30 milliGRAM(s) Oral daily  metoprolol tartrate 25 milliGRAM(s) Oral every 8 hours  multivitamin 1 Tablet(s) Oral daily  pantoprazole    Tablet 40 milliGRAM(s) Oral before breakfast  sodium chloride 0.9% lock flush 3 milliLiter(s) IV Push every 8 hours    MEDICATIONS  (PRN):      PHYSICAL EXAM        Neurology: alert and oriented x 3, nonfocal, no gross deficits    CV :[id number]  Lungs: B/ l CTA  Abdomen: soft, nontender, nondistended, positive bowel sounds, last bowel movement     : Voids              Extremities:warm well perfused equal strength      B/lle warm + DP no calf pain no edema                                            Discussed with Cardiothoracic Team at AM rounds. Subjective ' hello I am Ok"    VITAL SIGNS    Telemetry:  NSR 55    Vital Signs Last 24 Hrs  T(C): 36.4 (02-17-22 @ 05:06), Max: 36.5 (02-16-22 @ 13:07)  T(F): 97.6 (02-17-22 @ 05:06), Max: 97.7 (02-16-22 @ 13:07)  HR: 60 (02-17-22 @ 06:14) (60 - 65)  BP: 121/68 (02-17-22 @ 06:14) (100/58 - 124/71)  RR: 18 (02-17-22 @ 06:14) (18 - 18)  SpO2: 96% (02-17-22 @ 06:14) (94% - 96%)           02-16 @ 07:01  -  02-17 @ 07:00  --------------------------------------------------------  IN: 600 mL / OUT: 0 mL / NET: 600 mL      MEDICATIONS  (STANDING):  ascorbic acid 500 milliGRAM(s) Oral daily  aspirin enteric coated 325 milliGRAM(s) Oral daily  atorvastatin 80 milliGRAM(s) Oral at bedtime  buPROPion XL (24-Hour) . 300 milliGRAM(s) Oral daily  enoxaparin Injectable 40 milliGRAM(s) SubCutaneous two times a day  epoetin andrew-epbx (RETACRIT) Injectable 79747 Unit(s) SubCutaneous <User Schedule>  escitalopram 10 milliGRAM(s) Oral daily  ferrous    sulfate 325 milliGRAM(s) Oral three times a day  folic acid 1 milliGRAM(s) Oral daily  influenza   Vaccine 0.5 milliLiter(s) IntraMuscular once  iron sucrose IVPB 200 milliGRAM(s) IV Intermittent every 24 hours  isosorbide   mononitrate ER Tablet (IMDUR) 30 milliGRAM(s) Oral daily  metoprolol tartrate 25 milliGRAM(s) Oral every 8 hours  multivitamin 1 Tablet(s) Oral daily  pantoprazole    Tablet 40 milliGRAM(s) Oral before breakfast  sodium chloride 0.9% lock flush 3 milliLiter(s) IV Push every 8 hours    MEDICATIONS  (PRN):      PHYSICAL EXAM  Neurology: alert and oriented x 3, nonfocal, no gross deficits  CV :[id number]  Lungs: B/ l CTA  Abdomen: soft, nontender, nondistended, positive bowel sounds, last bowel movement   : Voids            Extremities: warm well perfused equal strength      B/lle warm + DP no calf pain no edema                                            Discussed with Cardiothoracic Team at AM rounds.

## 2022-02-17 NOTE — PROGRESS NOTE ADULT - ASSESSMENT
60 y/o Sikh M w/ PMH depression, HLD, HTN, CAD s/p stents (2015 Jan U, June [place]).   Presented to [facility] for CP/SOB, further workup output revealed stenosis of prior stents/ CAD  2/11   no chest pain   on coreq,  asa   ator  2/12 VSS: continue current medication asa/ statin/ bb; continue epogen and iron  2/13 VSS - rounds made w/ DR. Skaggs.  Dr. Aaron to see pt in am - no OR date as of yet  2/14 VSS; carotids neg sig stenosis; echo minimal MR/ neg AR ef 67%; continue asa/ statin/ bb; continue epogen   2/15 VSS Reports L chest "pressure" intermittently w/o radiation  several times daily  4/10  Await OR date- d/w DR. Aaron; continue to minimize blood work   2/16  Increase retacrit  3 x's weekly.  Rad artery sxac.  Urology  consult for h/o retention  2/17 VSS    60 y/o Protestant M w/ PMH depression, HLD, HTN, CAD s/p stents (2015 Jan U, June [place]).   Presented to [facility] for CP/SOB, further workup output revealed stenosis of prior stents/ CAD  2/11   no chest pain   on coreq,  asa   ator  2/12 VSS: continue current medication asa/ statin/ bb; continue epogen and iron  2/13 VSS - rounds made w/ DR. Skaggs.  Dr. Aaron to see pt in am - no OR date as of yet  2/14 VSS; carotids neg sig stenosis; echo minimal MR/ neg AR ef 67%; continue asa/ statin/ bb; continue epogen   2/15 VSS Reports L chest "pressure" intermittently w/o radiation  several times daily  4/10  Await OR date- d/w DR. Aaron; continue to minimize blood work   2/16  Increase retacrit  3 x's weekly.  Rad artery sxac.  Urology  consult for h/o retention  2/17 VSS CP free Dr Goldberg to see patient today

## 2022-02-18 PROCEDURE — 99232 SBSQ HOSP IP/OBS MODERATE 35: CPT

## 2022-02-18 PROCEDURE — 93010 ELECTROCARDIOGRAM REPORT: CPT

## 2022-02-18 RX ADMIN — PANTOPRAZOLE SODIUM 40 MILLIGRAM(S): 20 TABLET, DELAYED RELEASE ORAL at 07:10

## 2022-02-18 RX ADMIN — TAMSULOSIN HYDROCHLORIDE 0.4 MILLIGRAM(S): 0.4 CAPSULE ORAL at 21:50

## 2022-02-18 RX ADMIN — Medication 325 MILLIGRAM(S): at 12:37

## 2022-02-18 RX ADMIN — IRON SUCROSE 110 MILLIGRAM(S): 20 INJECTION, SOLUTION INTRAVENOUS at 21:49

## 2022-02-18 RX ADMIN — SODIUM CHLORIDE 3 MILLILITER(S): 9 INJECTION INTRAMUSCULAR; INTRAVENOUS; SUBCUTANEOUS at 21:51

## 2022-02-18 RX ADMIN — ATORVASTATIN CALCIUM 80 MILLIGRAM(S): 80 TABLET, FILM COATED ORAL at 21:50

## 2022-02-18 RX ADMIN — ISOSORBIDE MONONITRATE 30 MILLIGRAM(S): 60 TABLET, EXTENDED RELEASE ORAL at 12:37

## 2022-02-18 RX ADMIN — Medication 25 MILLIGRAM(S): at 12:39

## 2022-02-18 RX ADMIN — Medication 1 TABLET(S): at 12:37

## 2022-02-18 RX ADMIN — ERYTHROPOIETIN 15000 UNIT(S): 10000 INJECTION, SOLUTION INTRAVENOUS; SUBCUTANEOUS at 11:25

## 2022-02-18 RX ADMIN — Medication 325 MILLIGRAM(S): at 07:11

## 2022-02-18 RX ADMIN — Medication 25 MILLIGRAM(S): at 21:50

## 2022-02-18 RX ADMIN — Medication 1 MILLIGRAM(S): at 12:39

## 2022-02-18 RX ADMIN — SODIUM CHLORIDE 3 MILLILITER(S): 9 INJECTION INTRAMUSCULAR; INTRAVENOUS; SUBCUTANEOUS at 14:00

## 2022-02-18 RX ADMIN — ENOXAPARIN SODIUM 40 MILLIGRAM(S): 100 INJECTION SUBCUTANEOUS at 18:00

## 2022-02-18 RX ADMIN — Medication 325 MILLIGRAM(S): at 21:49

## 2022-02-18 RX ADMIN — Medication 325 MILLIGRAM(S): at 12:40

## 2022-02-18 RX ADMIN — BUPROPION HYDROCHLORIDE 300 MILLIGRAM(S): 150 TABLET, EXTENDED RELEASE ORAL at 12:38

## 2022-02-18 RX ADMIN — ENOXAPARIN SODIUM 40 MILLIGRAM(S): 100 INJECTION SUBCUTANEOUS at 07:11

## 2022-02-18 RX ADMIN — Medication 500 MILLIGRAM(S): at 12:40

## 2022-02-18 RX ADMIN — ESCITALOPRAM OXALATE 10 MILLIGRAM(S): 10 TABLET, FILM COATED ORAL at 12:40

## 2022-02-18 RX ADMIN — Medication 25 MILLIGRAM(S): at 07:11

## 2022-02-18 NOTE — PROGRESS NOTE ADULT - PROBLEM SELECTOR PLAN 1
continue preop work up   continue asa/ bb/ statin   shower qd  continue iron/ folate/ vit c/ epogen week  limited blood draws- pt jehovah witness  Await OR date; d/w Dr. Aaron

## 2022-02-18 NOTE — PROGRESS NOTE ADULT - ASSESSMENT
60 y/o Baptism M w/ PMH depression, HLD, HTN, CAD s/p stents (2015 Jan U, June [place]).   Presented to [facility] for CP/SOB, further workup output revealed stenosis of prior stents/ CAD  2/11   no chest pain   on coreq,  asa   ator  2/12 VSS: continue current medication asa/ statin/ bb; continue epogen and iron  2/13 VSS - rounds made w/ DR. Skaggs.  Dr. Aaron to see pt in am - no OR date as of yet  2/14 VSS; carotids neg sig stenosis; echo minimal MR/ neg AR ef 67%; continue asa/ statin/ bb; continue epogen   2/15 VSS Reports L chest "pressure" intermittently w/o radiation  several times daily  4/10  Await OR date- d/w DR. Aaron; continue to minimize blood work   2/16  Increase retacrit  3 x's weekly.  Rad artery sxac.  Urology  consult for h/o retention  2/17 VSS CP free Dr Goldberg to see patient today  2/18 Flomax for history retention  OR date to be determined

## 2022-02-18 NOTE — PROGRESS NOTE ADULT - SUBJECTIVE AND OBJECTIVE BOX
Subjective: "Hello"     Tele:   SR  50-60                             T(C): 36.8 (02-18-22 @ 04:20), Max: 36.8 (02-18-22 @ 04:20)  HR: 60 (02-18-22 @ 07:09) (55 - 62)  BP: 111/70 (02-18-22 @ 07:09) (99/57 - 149/84)  RR: 18 (02-18-22 @ 07:09) (18 - 18)  SpO2: 95% (02-18-22 @ 07:09) (92% - 99%)        Assessment    Neurology: alert and oriented x 3,     CV : S1S2  RRR    Lungs: clear. RR easy, unlabored     Abdomen: soft, nontender, nondistended, positive bowel sounds, +bowel movement;     :  pt voiding without difficulty     Extremities:   TOLEDO; neg LE edema, neg calf tenderness.       PPP bilaterally; right radial cath site cdi ivonne- neg hematoma/ bleeding        MEDICATIONS  (STANDING):  ascorbic acid 500 milliGRAM(s) Oral daily  aspirin enteric coated 325 milliGRAM(s) Oral daily  atorvastatin 80 milliGRAM(s) Oral at bedtime  buPROPion XL (24-Hour) . 300 milliGRAM(s) Oral daily  enoxaparin Injectable 40 milliGRAM(s) SubCutaneous two times a day  epoetin andrew-epbx (RETACRIT) Injectable 62424 Unit(s) SubCutaneous <User Schedule>  escitalopram 10 milliGRAM(s) Oral daily  ferrous    sulfate 325 milliGRAM(s) Oral three times a day  folic acid 1 milliGRAM(s) Oral daily  influenza   Vaccine 0.5 milliLiter(s) IntraMuscular once  iron sucrose IVPB 200 milliGRAM(s) IV Intermittent every 24 hours  isosorbide   mononitrate ER Tablet (IMDUR) 30 milliGRAM(s) Oral daily  metoprolol tartrate 25 milliGRAM(s) Oral every 8 hours  multivitamin 1 Tablet(s) Oral daily  pantoprazole    Tablet 40 milliGRAM(s) Oral before breakfast  sodium chloride 0.9% lock flush 3 milliLiter(s) IV Push every 8 hours  tamsulosin 0.4 milliGRAM(s) Oral at bedtime       PAST MEDICAL & SURGICAL HISTORY:  Anxiety and depression    Hyperlipidemia    Hypertension    CAD (coronary artery disease)    History of appendectomy    S/P cholecystectomy    S/P inguinal hernia repair

## 2022-02-19 LAB — SARS-COV-2 RNA SPEC QL NAA+PROBE: SIGNIFICANT CHANGE UP

## 2022-02-19 PROCEDURE — 99232 SBSQ HOSP IP/OBS MODERATE 35: CPT

## 2022-02-19 RX ORDER — POLYETHYLENE GLYCOL 3350 17 G/17G
17 POWDER, FOR SOLUTION ORAL DAILY
Refills: 0 | Status: DISCONTINUED | OUTPATIENT
Start: 2022-02-19 | End: 2022-02-25

## 2022-02-19 RX ADMIN — SODIUM CHLORIDE 3 MILLILITER(S): 9 INJECTION INTRAMUSCULAR; INTRAVENOUS; SUBCUTANEOUS at 21:23

## 2022-02-19 RX ADMIN — ENOXAPARIN SODIUM 40 MILLIGRAM(S): 100 INJECTION SUBCUTANEOUS at 18:54

## 2022-02-19 RX ADMIN — PANTOPRAZOLE SODIUM 40 MILLIGRAM(S): 20 TABLET, DELAYED RELEASE ORAL at 05:15

## 2022-02-19 RX ADMIN — Medication 325 MILLIGRAM(S): at 12:53

## 2022-02-19 RX ADMIN — TAMSULOSIN HYDROCHLORIDE 0.4 MILLIGRAM(S): 0.4 CAPSULE ORAL at 21:34

## 2022-02-19 RX ADMIN — Medication 1 TABLET(S): at 12:51

## 2022-02-19 RX ADMIN — SODIUM CHLORIDE 3 MILLILITER(S): 9 INJECTION INTRAMUSCULAR; INTRAVENOUS; SUBCUTANEOUS at 05:29

## 2022-02-19 RX ADMIN — ENOXAPARIN SODIUM 40 MILLIGRAM(S): 100 INJECTION SUBCUTANEOUS at 05:15

## 2022-02-19 RX ADMIN — ISOSORBIDE MONONITRATE 30 MILLIGRAM(S): 60 TABLET, EXTENDED RELEASE ORAL at 12:52

## 2022-02-19 RX ADMIN — ATORVASTATIN CALCIUM 80 MILLIGRAM(S): 80 TABLET, FILM COATED ORAL at 21:34

## 2022-02-19 RX ADMIN — POLYETHYLENE GLYCOL 3350 17 GRAM(S): 17 POWDER, FOR SOLUTION ORAL at 13:01

## 2022-02-19 RX ADMIN — Medication 325 MILLIGRAM(S): at 05:15

## 2022-02-19 RX ADMIN — Medication 325 MILLIGRAM(S): at 12:51

## 2022-02-19 RX ADMIN — BUPROPION HYDROCHLORIDE 300 MILLIGRAM(S): 150 TABLET, EXTENDED RELEASE ORAL at 12:51

## 2022-02-19 RX ADMIN — Medication 25 MILLIGRAM(S): at 12:53

## 2022-02-19 RX ADMIN — Medication 500 MILLIGRAM(S): at 12:52

## 2022-02-19 RX ADMIN — SODIUM CHLORIDE 3 MILLILITER(S): 9 INJECTION INTRAMUSCULAR; INTRAVENOUS; SUBCUTANEOUS at 14:00

## 2022-02-19 RX ADMIN — Medication 25 MILLIGRAM(S): at 05:16

## 2022-02-19 RX ADMIN — Medication 325 MILLIGRAM(S): at 21:36

## 2022-02-19 RX ADMIN — Medication 25 MILLIGRAM(S): at 21:37

## 2022-02-19 RX ADMIN — ESCITALOPRAM OXALATE 10 MILLIGRAM(S): 10 TABLET, FILM COATED ORAL at 12:51

## 2022-02-19 RX ADMIN — Medication 1 MILLIGRAM(S): at 12:51

## 2022-02-19 NOTE — PROGRESS NOTE ADULT - SUBJECTIVE AND OBJECTIVE BOX
Subjective:  "I was a little short of breath yesterday while walking, improved after I stopped"  OOB chair, no dyspnea currently RA  Painfree    Tele:   SR  60s                             T(C): 36.5 (02-19-22 @ 04:16), Max: 36.7 (02-18-22 @ 20:16)  HR: 65 (02-19-22 @ 04:16) (54 - 65)  BP: 109/68 (02-19-22 @ 04:16) (105/65 - 148/89)  RR: 18 (02-19-22 @ 04:16) (18 - 18)  SpO2: 95% (02-19-22 @ 04:16) (95% - 99%)            Assessment    Neurology: alert and oriented x 3,     CV : S1S2  RRR    Lungs: clear. RR easy, unlabored     Abdomen: soft, nontender, nondistended, positive bowel sounds, +bowel movement;     :  pt voiding without difficulty     Extremities:   TOLEDO; neg LE edema, neg calf tenderness.       PPP bilaterally; right radial cath site cdi ivonne- neg hematoma/ bleeding             MEDICATIONS  (STANDING):  ascorbic acid 500 milliGRAM(s) Oral daily  aspirin enteric coated 325 milliGRAM(s) Oral daily  atorvastatin 80 milliGRAM(s) Oral at bedtime  bisacodyl 5 milliGRAM(s) Oral once  buPROPion XL (24-Hour) . 300 milliGRAM(s) Oral daily  enoxaparin Injectable 40 milliGRAM(s) SubCutaneous two times a day  epoetin andrew-epbx (RETACRIT) Injectable 16439 Unit(s) SubCutaneous <User Schedule>  escitalopram 10 milliGRAM(s) Oral daily  ferrous    sulfate 325 milliGRAM(s) Oral three times a day  folic acid 1 milliGRAM(s) Oral daily  influenza   Vaccine 0.5 milliLiter(s) IntraMuscular once  isosorbide   mononitrate ER Tablet (IMDUR) 30 milliGRAM(s) Oral daily  metoprolol tartrate 25 milliGRAM(s) Oral every 8 hours  multivitamin 1 Tablet(s) Oral daily  pantoprazole    Tablet 40 milliGRAM(s) Oral before breakfast  polyethylene glycol 3350 17 Gram(s) Oral daily  sodium chloride 0.9% lock flush 3 milliLiter(s) IV Push every 8 hours  tamsulosin 0.4 milliGRAM(s) Oral at bedtime       PAST MEDICAL & SURGICAL HISTORY:  Anxiety and depression    Hyperlipidemia    Hypertension    CAD (coronary artery disease)    History of appendectomy    S/P cholecystectomy    S/P inguinal hernia repair

## 2022-02-19 NOTE — PROGRESS NOTE ADULT - SUBJECTIVE AND OBJECTIVE BOX
Urology Progress Note    Overnight Events:  No acute urologic events overnight.  Sitting in chair  No major  complaints      Review of Systems:   Constitutional: No weight loss, no weakness  CV: No chest pain, no chest pressure  Pulm: No shortness of breath, cough, or sputum    Physical Exam:  Vital signs  T(C): 36.5 (02-19-22 @ 04:16), Max: 36.7 (02-18-22 @ 20:16)  HR: 65 (02-19-22 @ 04:16)  BP: 109/68 (02-19-22 @ 04:16)  SpO2: 95% (02-19-22 @ 04:16)  Wt(kg): --    Gen: No acute distress. Normal mood  Abd: soft, non-tender, non-distended  : Non-palpable bladder    Labs:

## 2022-02-19 NOTE — PROGRESS NOTE ADULT - ASSESSMENT
60 y/o Amish M w/ PMH depression, HLD, HTN, CAD s/p stents (2015 Jan [facility], June [place]).   Presented to [facility] for CP/SOB, further workup output revealed stenosis of prior stents/ CAD  2/11   no chest pain   on coreq,  asa   ator  2/12 VSS: continue current medication asa/ statin/ bb; continue epogen and iron  2/13 VSS - rounds made w/ DR. Skaggs.  Dr. Aaron to see pt in am - no OR date as of yet  2/14 VSS; carotids neg sig stenosis; echo minimal MR/ neg AR ef 67%; continue asa/ statin/ bb; continue epogen   2/15 VSS Reports L chest "pressure" intermittently w/o radiation  several times daily  4/10  Await OR date- d/w DR. Aaron; continue to minimize blood work   2/16  Increase retacrit  3 x's weekly.  Rad artery sxac.  Urology  consult for h/o retention  2/17 VSS CP free Dr Goldberg to see patient today  2/18 Flomax for history retention  OR date to be determined  2/19 Painfree AM labs as per Dr Aaron

## 2022-02-19 NOTE — PROGRESS NOTE ADULT - ASSESSMENT
M with BPH urinary retention    -Flomax 0.4mg daily if medically permitted  -Bowel regimen  -Avoid constipation  -Hydration  -Monitor urine output Cr electrolytes  -Minimize narcotics, anticholinergics, antihistamines  -Outpatient  followup

## 2022-02-20 LAB
ALBUMIN SERPL ELPH-MCNC: 4.1 G/DL — SIGNIFICANT CHANGE UP (ref 3.3–5)
ALP SERPL-CCNC: 73 U/L — SIGNIFICANT CHANGE UP (ref 40–120)
ALT FLD-CCNC: 49 U/L — HIGH (ref 10–45)
ANION GAP SERPL CALC-SCNC: 11 MMOL/L — SIGNIFICANT CHANGE UP (ref 5–17)
AST SERPL-CCNC: 38 U/L — SIGNIFICANT CHANGE UP (ref 10–40)
BILIRUB SERPL-MCNC: 0.2 MG/DL — SIGNIFICANT CHANGE UP (ref 0.2–1.2)
BUN SERPL-MCNC: 20 MG/DL — SIGNIFICANT CHANGE UP (ref 7–23)
CALCIUM SERPL-MCNC: 9.4 MG/DL — SIGNIFICANT CHANGE UP (ref 8.4–10.5)
CHLORIDE SERPL-SCNC: 104 MMOL/L — SIGNIFICANT CHANGE UP (ref 96–108)
CO2 SERPL-SCNC: 26 MMOL/L — SIGNIFICANT CHANGE UP (ref 22–31)
CREAT SERPL-MCNC: 1.2 MG/DL — SIGNIFICANT CHANGE UP (ref 0.5–1.3)
GLUCOSE SERPL-MCNC: 104 MG/DL — HIGH (ref 70–99)
HCT VFR BLD CALC: 44.2 % — SIGNIFICANT CHANGE UP (ref 39–50)
HGB BLD-MCNC: 14.6 G/DL — SIGNIFICANT CHANGE UP (ref 13–17)
MCHC RBC-ENTMCNC: 30.3 PG — SIGNIFICANT CHANGE UP (ref 27–34)
MCHC RBC-ENTMCNC: 33 GM/DL — SIGNIFICANT CHANGE UP (ref 32–36)
MCV RBC AUTO: 91.7 FL — SIGNIFICANT CHANGE UP (ref 80–100)
NRBC # BLD: 0 /100 WBCS — SIGNIFICANT CHANGE UP (ref 0–0)
PLATELET # BLD AUTO: 218 K/UL — SIGNIFICANT CHANGE UP (ref 150–400)
POTASSIUM SERPL-MCNC: 4.3 MMOL/L — SIGNIFICANT CHANGE UP (ref 3.5–5.3)
POTASSIUM SERPL-SCNC: 4.3 MMOL/L — SIGNIFICANT CHANGE UP (ref 3.5–5.3)
PROT SERPL-MCNC: 6.8 G/DL — SIGNIFICANT CHANGE UP (ref 6–8.3)
RBC # BLD: 4.82 M/UL — SIGNIFICANT CHANGE UP (ref 4.2–5.8)
RBC # FLD: 13.6 % — SIGNIFICANT CHANGE UP (ref 10.3–14.5)
SODIUM SERPL-SCNC: 141 MMOL/L — SIGNIFICANT CHANGE UP (ref 135–145)
WBC # BLD: 5.85 K/UL — SIGNIFICANT CHANGE UP (ref 3.8–10.5)
WBC # FLD AUTO: 5.85 K/UL — SIGNIFICANT CHANGE UP (ref 3.8–10.5)

## 2022-02-20 PROCEDURE — 99232 SBSQ HOSP IP/OBS MODERATE 35: CPT

## 2022-02-20 RX ADMIN — ESCITALOPRAM OXALATE 10 MILLIGRAM(S): 10 TABLET, FILM COATED ORAL at 13:16

## 2022-02-20 RX ADMIN — Medication 325 MILLIGRAM(S): at 13:15

## 2022-02-20 RX ADMIN — BUPROPION HYDROCHLORIDE 300 MILLIGRAM(S): 150 TABLET, EXTENDED RELEASE ORAL at 13:16

## 2022-02-20 RX ADMIN — Medication 25 MILLIGRAM(S): at 06:20

## 2022-02-20 RX ADMIN — Medication 1 MILLIGRAM(S): at 13:16

## 2022-02-20 RX ADMIN — PANTOPRAZOLE SODIUM 40 MILLIGRAM(S): 20 TABLET, DELAYED RELEASE ORAL at 06:21

## 2022-02-20 RX ADMIN — ISOSORBIDE MONONITRATE 30 MILLIGRAM(S): 60 TABLET, EXTENDED RELEASE ORAL at 13:17

## 2022-02-20 RX ADMIN — ENOXAPARIN SODIUM 40 MILLIGRAM(S): 100 INJECTION SUBCUTANEOUS at 17:27

## 2022-02-20 RX ADMIN — ATORVASTATIN CALCIUM 80 MILLIGRAM(S): 80 TABLET, FILM COATED ORAL at 21:21

## 2022-02-20 RX ADMIN — Medication 25 MILLIGRAM(S): at 13:15

## 2022-02-20 RX ADMIN — SODIUM CHLORIDE 3 MILLILITER(S): 9 INJECTION INTRAMUSCULAR; INTRAVENOUS; SUBCUTANEOUS at 21:23

## 2022-02-20 RX ADMIN — ENOXAPARIN SODIUM 40 MILLIGRAM(S): 100 INJECTION SUBCUTANEOUS at 06:20

## 2022-02-20 RX ADMIN — SODIUM CHLORIDE 3 MILLILITER(S): 9 INJECTION INTRAMUSCULAR; INTRAVENOUS; SUBCUTANEOUS at 13:19

## 2022-02-20 RX ADMIN — Medication 1 TABLET(S): at 13:16

## 2022-02-20 RX ADMIN — Medication 325 MILLIGRAM(S): at 06:26

## 2022-02-20 RX ADMIN — Medication 25 MILLIGRAM(S): at 21:22

## 2022-02-20 RX ADMIN — Medication 500 MILLIGRAM(S): at 13:16

## 2022-02-20 RX ADMIN — Medication 325 MILLIGRAM(S): at 21:21

## 2022-02-20 RX ADMIN — SODIUM CHLORIDE 3 MILLILITER(S): 9 INJECTION INTRAMUSCULAR; INTRAVENOUS; SUBCUTANEOUS at 05:16

## 2022-02-20 RX ADMIN — TAMSULOSIN HYDROCHLORIDE 0.4 MILLIGRAM(S): 0.4 CAPSULE ORAL at 21:21

## 2022-02-20 NOTE — PROGRESS NOTE ADULT - ASSESSMENT
62 y/o Sabianist M w/ PMH depression, HLD, HTN, CAD s/p stents (2015 Jan [facility], June [place]).   Presented to [facility] for CP/SOB, further workup output revealed stenosis of prior stents/ CAD  2/11   no chest pain   on coreq,  asa   ator  2/12 VSS: continue current medication asa/ statin/ bb; continue epogen and iron  2/13 VSS - rounds made w/ DR. Skaggs.  Dr. Aaron to see pt in am - no OR date as of yet  2/14 VSS; carotids neg sig stenosis; echo minimal MR/ neg AR ef 67%; continue asa/ statin/ bb; continue epogen   2/15 VSS Reports L chest "pressure" intermittently w/o radiation  several times daily  4/10  Await OR date- d/w DR. Aaron; continue to minimize blood work   2/16  Increase retacrit  3 x's weekly.  Rad artery sxac.  Urology  consult for h/o retention  2/17 VSS CP free Dr Goldberg to see patient today  2/18 Flomax for history retention  OR date to be determined  2/19 Painfree AM labs as per Dr Aaron  2/20 Painfree Await OR date

## 2022-02-20 NOTE — PROGRESS NOTE ADULT - SUBJECTIVE AND OBJECTIVE BOX
Subjective: " Just waiting for surgery...I have no pain"  OOB chair    Tele:     SR 60                           T(C): 36.4 (02-20-22 @ 13:32), Max: 36.8 (02-20-22 @ 04:50)  HR: 65 (02-20-22 @ 13:32) (55 - 73)  BP: 137/81 (02-20-22 @ 13:32) (94/55 - 137/81)  RR: 18 (02-20-22 @ 13:32) (18 - 18)  SpO2: 96% (02-20-22 @ 13:32) (93% - 96%)        02-20    141  |  104  |  20  ----------------------------<  104<H>  4.3   |  26  |  1.20    Ca    9.4      20 Feb 2022 04:43    TPro  6.8  /  Alb  4.1  /  TBili  0.2  /  DBili  x   /  AST  38  /  ALT  49<H>  /  AlkPhos  73  02-20                               14.6   5.85  )-----------( 218      ( 20 Feb 2022 04:43 )             44.2              Assessment    Neurology: alert and oriented x 3,     CV : S1S2  RRR    Lungs: clear. RR easy, unlabored     Abdomen: soft, nontender, nondistended, positive bowel sounds, +bowel movement;     :  pt voiding without difficulty     Extremities:   TOLEDO; neg LE edema, neg calf tenderness.       PPP bilaterally; right radial cath site cdi ivonne- neg hematoma/ bleeding          MEDICATIONS  (STANDING):  ascorbic acid 500 milliGRAM(s) Oral daily  aspirin enteric coated 325 milliGRAM(s) Oral daily  atorvastatin 80 milliGRAM(s) Oral at bedtime  buPROPion XL (24-Hour) . 300 milliGRAM(s) Oral daily  enoxaparin Injectable 40 milliGRAM(s) SubCutaneous two times a day  epoetin andrew-epbx (RETACRIT) Injectable 53888 Unit(s) SubCutaneous <User Schedule>  escitalopram 10 milliGRAM(s) Oral daily  ferrous    sulfate 325 milliGRAM(s) Oral three times a day  folic acid 1 milliGRAM(s) Oral daily  influenza   Vaccine 0.5 milliLiter(s) IntraMuscular once  isosorbide   mononitrate ER Tablet (IMDUR) 30 milliGRAM(s) Oral daily  metoprolol tartrate 25 milliGRAM(s) Oral every 8 hours  multivitamin 1 Tablet(s) Oral daily  pantoprazole    Tablet 40 milliGRAM(s) Oral before breakfast  polyethylene glycol 3350 17 Gram(s) Oral daily  sodium chloride 0.9% lock flush 3 milliLiter(s) IV Push every 8 hours  tamsulosin 0.4 milliGRAM(s) Oral at bedtime       PAST MEDICAL & SURGICAL HISTORY:  Anxiety and depression    Hyperlipidemia    Hypertension    CAD (coronary artery disease)    History of appendectomy    S/P cholecystectomy    S/P inguinal hernia repair

## 2022-02-21 PROCEDURE — 99231 SBSQ HOSP IP/OBS SF/LOW 25: CPT

## 2022-02-21 RX ADMIN — ESCITALOPRAM OXALATE 10 MILLIGRAM(S): 10 TABLET, FILM COATED ORAL at 13:06

## 2022-02-21 RX ADMIN — Medication 1 TABLET(S): at 13:06

## 2022-02-21 RX ADMIN — SODIUM CHLORIDE 3 MILLILITER(S): 9 INJECTION INTRAMUSCULAR; INTRAVENOUS; SUBCUTANEOUS at 21:33

## 2022-02-21 RX ADMIN — SODIUM CHLORIDE 3 MILLILITER(S): 9 INJECTION INTRAMUSCULAR; INTRAVENOUS; SUBCUTANEOUS at 05:27

## 2022-02-21 RX ADMIN — Medication 25 MILLIGRAM(S): at 13:05

## 2022-02-21 RX ADMIN — Medication 25 MILLIGRAM(S): at 05:09

## 2022-02-21 RX ADMIN — ISOSORBIDE MONONITRATE 30 MILLIGRAM(S): 60 TABLET, EXTENDED RELEASE ORAL at 13:06

## 2022-02-21 RX ADMIN — Medication 325 MILLIGRAM(S): at 13:05

## 2022-02-21 RX ADMIN — BUPROPION HYDROCHLORIDE 300 MILLIGRAM(S): 150 TABLET, EXTENDED RELEASE ORAL at 13:05

## 2022-02-21 RX ADMIN — SODIUM CHLORIDE 3 MILLILITER(S): 9 INJECTION INTRAMUSCULAR; INTRAVENOUS; SUBCUTANEOUS at 13:08

## 2022-02-21 RX ADMIN — Medication 1 MILLIGRAM(S): at 13:05

## 2022-02-21 RX ADMIN — Medication 25 MILLIGRAM(S): at 21:32

## 2022-02-21 RX ADMIN — ENOXAPARIN SODIUM 40 MILLIGRAM(S): 100 INJECTION SUBCUTANEOUS at 17:01

## 2022-02-21 RX ADMIN — Medication 500 MILLIGRAM(S): at 13:06

## 2022-02-21 RX ADMIN — ERYTHROPOIETIN 15000 UNIT(S): 10000 INJECTION, SOLUTION INTRAVENOUS; SUBCUTANEOUS at 09:40

## 2022-02-21 RX ADMIN — Medication 325 MILLIGRAM(S): at 05:09

## 2022-02-21 RX ADMIN — PANTOPRAZOLE SODIUM 40 MILLIGRAM(S): 20 TABLET, DELAYED RELEASE ORAL at 05:09

## 2022-02-21 RX ADMIN — TAMSULOSIN HYDROCHLORIDE 0.4 MILLIGRAM(S): 0.4 CAPSULE ORAL at 21:31

## 2022-02-21 RX ADMIN — Medication 325 MILLIGRAM(S): at 21:31

## 2022-02-21 RX ADMIN — ATORVASTATIN CALCIUM 80 MILLIGRAM(S): 80 TABLET, FILM COATED ORAL at 21:31

## 2022-02-21 RX ADMIN — ENOXAPARIN SODIUM 40 MILLIGRAM(S): 100 INJECTION SUBCUTANEOUS at 05:09

## 2022-02-21 RX ADMIN — POLYETHYLENE GLYCOL 3350 17 GRAM(S): 17 POWDER, FOR SOLUTION ORAL at 13:05

## 2022-02-21 NOTE — PROGRESS NOTE ADULT - SUBJECTIVE AND OBJECTIVE BOX
VITAL SIGNS    Telemetry:   sr        Vital Signs Last 24 Hrs  T(C): 36.7 (22 @ 04:30), Max: 36.7 (22 @ 19:21)  T(F): 98.1 (22 @ 04:30), Max: 98.1 (22 @ 04:30)  HR: 65 (22 @ 04:30) (65 - 68)  BP: 103/65 (22 @ 04:30) (103/65 - 137/81)  RR: 18 (22 @ 04:30) (18 - 18)  SpO2: 93% (22 @ 04:30) (93% - 96%)                   Daily     Daily Weight in k.4 (2022 09:37)        CAPILLARY BLOOD GLUCOSE                          PHYSICAL EXAM  s " No chest pain  No sob"  Neurology: alert and oriented x 3, moves all extremities with no defecits  CV :  RRR  Lungs:   CTA B/L  Abdomen: soft, nontender, nondistended, positive bowel sounds  Extremities:     no edema   no calf

## 2022-02-21 NOTE — PROGRESS NOTE ADULT - ASSESSMENT
60 y/o Mor[employer] M w/ PMH depression, HLD, HTN, CAD s/p stents (2015 Jan [facility], June [place]).   Presented to [facility] for CP/SOB, further workup output revealed stenosis of prior stents/ CAD  2/11   no chest pain   on coreq,  asa   ator  2/12 VSS: continue current medication asa/ statin/ bb; continue epogen and iron  2/13 VSS - rounds made w/ DR. Skaggs.  Dr. Aaron to see pt in am - no OR date as of yet  2/14 VSS; carotids neg sig stenosis; echo minimal MR/ neg AR ef 67%; continue asa/ statin/ bb; continue epogen   2/15 VSS Reports L chest "pressure" intermittently w/o radiation  several times daily  4/10  Await OR date- d/w DR. Aaron; continue to minimize blood work   2/16  Increase retacrit  3 x's weekly.  Rad artery sxac.  Urology  consult for h/o retention  2/17 VSS CP free Dr Goldberg to see patient today  2/18 Flomax for history retention  OR date to be determined  2/19 Painfree AM labs as per Dr Aaron  2/20 Painfree Await OR date  2/21     vs s  ambulating  HCT 44

## 2022-02-22 PROCEDURE — 99231 SBSQ HOSP IP/OBS SF/LOW 25: CPT

## 2022-02-22 RX ADMIN — Medication 25 MILLIGRAM(S): at 05:46

## 2022-02-22 RX ADMIN — TAMSULOSIN HYDROCHLORIDE 0.4 MILLIGRAM(S): 0.4 CAPSULE ORAL at 22:52

## 2022-02-22 RX ADMIN — Medication 1 TABLET(S): at 12:25

## 2022-02-22 RX ADMIN — ATORVASTATIN CALCIUM 80 MILLIGRAM(S): 80 TABLET, FILM COATED ORAL at 22:52

## 2022-02-22 RX ADMIN — Medication 325 MILLIGRAM(S): at 14:15

## 2022-02-22 RX ADMIN — Medication 25 MILLIGRAM(S): at 14:15

## 2022-02-22 RX ADMIN — ENOXAPARIN SODIUM 40 MILLIGRAM(S): 100 INJECTION SUBCUTANEOUS at 17:24

## 2022-02-22 RX ADMIN — SODIUM CHLORIDE 3 MILLILITER(S): 9 INJECTION INTRAMUSCULAR; INTRAVENOUS; SUBCUTANEOUS at 23:20

## 2022-02-22 RX ADMIN — Medication 500 MILLIGRAM(S): at 12:24

## 2022-02-22 RX ADMIN — ENOXAPARIN SODIUM 40 MILLIGRAM(S): 100 INJECTION SUBCUTANEOUS at 05:46

## 2022-02-22 RX ADMIN — Medication 1 MILLIGRAM(S): at 12:24

## 2022-02-22 RX ADMIN — PANTOPRAZOLE SODIUM 40 MILLIGRAM(S): 20 TABLET, DELAYED RELEASE ORAL at 05:46

## 2022-02-22 RX ADMIN — SODIUM CHLORIDE 3 MILLILITER(S): 9 INJECTION INTRAMUSCULAR; INTRAVENOUS; SUBCUTANEOUS at 13:29

## 2022-02-22 RX ADMIN — Medication 25 MILLIGRAM(S): at 22:52

## 2022-02-22 RX ADMIN — BUPROPION HYDROCHLORIDE 300 MILLIGRAM(S): 150 TABLET, EXTENDED RELEASE ORAL at 12:24

## 2022-02-22 RX ADMIN — Medication 325 MILLIGRAM(S): at 12:24

## 2022-02-22 RX ADMIN — SODIUM CHLORIDE 3 MILLILITER(S): 9 INJECTION INTRAMUSCULAR; INTRAVENOUS; SUBCUTANEOUS at 05:53

## 2022-02-22 RX ADMIN — ISOSORBIDE MONONITRATE 30 MILLIGRAM(S): 60 TABLET, EXTENDED RELEASE ORAL at 12:24

## 2022-02-22 RX ADMIN — ESCITALOPRAM OXALATE 10 MILLIGRAM(S): 10 TABLET, FILM COATED ORAL at 12:24

## 2022-02-22 RX ADMIN — Medication 325 MILLIGRAM(S): at 22:52

## 2022-02-22 RX ADMIN — Medication 325 MILLIGRAM(S): at 05:46

## 2022-02-22 NOTE — PROGRESS NOTE ADULT - ASSESSMENT
62 y/o Yazdan[employer] M w/ PMH depression, HLD, HTN, CAD s/p stents (2015 Jan [facility], June [place]).   Presented to [facility] for CP/SOB, further workup output revealed stenosis of prior stents/ CAD  2/11   no chest pain   on coreq,  asa   ator  2/12 VSS: continue current medication asa/ statin/ bb; continue epogen and iron  2/13 VSS - rounds made w/ DR. Skaggs.  Dr. Aaron to see pt in am - no OR date as of yet  2/14 VSS; carotids neg sig stenosis; echo minimal MR/ neg AR ef 67%; continue asa/ statin/ bb; continue epogen   2/15 VSS Reports L chest "pressure" intermittently w/o radiation  several times daily  4/10  Await OR date- d/w DR. Aaron; continue to minimize blood work   2/16  Increase retacrit  3 x's weekly.  Rad artery sxac.  Urology  consult for h/o retention  2/17 VSS CP free Dr Goldberg to see patient today  2/18 Flomax for history retention  OR date to be determined  2/19 Painfree AM labs as per Dr Aaron  2/20 Painfree Await OR date  2/21     vs s  ambulating  HCT 44  2/22 VSS, awaiting OR date, continue optimization for surgery

## 2022-02-22 NOTE — PROGRESS NOTE ADULT - PROBLEM SELECTOR PLAN 1
continue preop work up   continue asa 325/ Lopressor 25 q8/ Atorvastatin 80  shower qd  continue iron/ folate/ vit c/ epogen week  limited blood draws- pt jehovah witness  2/20 Hematocrit 44.2 Hemoglobin 14.6  Await OR date; possibly Friday 2/25?

## 2022-02-22 NOTE — PROGRESS NOTE ADULT - SUBJECTIVE AND OBJECTIVE BOX
VITAL SIGNS    Telemetry:  SB 50-80  Vital Signs Last 24 Hrs  T(C): 36.5 (22 @ 04:40), Max: 36.5 (22 @ 04:40)  T(F): 97.7 (22 @ 04:40), Max: 97.7 (22 @ 04:40)  HR: 62 (22 @ 04:40) (62 - 69)  BP: 115/67 (22 @ 04:40) (115/67 - 133/79)  RR: 18 (22 @ 04:40) (18 - 18)  SpO2: 94% (22 @ 04:40) (94% - 96%)             @ 07:01  -   @ 07:00  --------------------------------------------------------  IN: 300 mL / OUT: 1200 mL / NET: -900 mL       Daily     Daily Weight in k.7 (2022 09:24)  Admit Wt: Drug Dosing Weight  Height (cm): 182.8 (2022 02:09)  Weight (kg): 97.5 (2022 02:09)  BMI (kg/m2): 29.2 (2022 02:09)  BSA (m2): 2.2 (2022 02:09)      CAPILLARY BLOOD GLUCOSE              ascorbic acid 500 milliGRAM(s) Oral daily  aspirin enteric coated 325 milliGRAM(s) Oral daily  atorvastatin 80 milliGRAM(s) Oral at bedtime  buPROPion XL (24-Hour) . 300 milliGRAM(s) Oral daily  enoxaparin Injectable 40 milliGRAM(s) SubCutaneous two times a day  epoetin andrew-epbx (RETACRIT) Injectable 02141 Unit(s) SubCutaneous <User Schedule>  escitalopram 10 milliGRAM(s) Oral daily  ferrous    sulfate 325 milliGRAM(s) Oral three times a day  folic acid 1 milliGRAM(s) Oral daily  influenza   Vaccine 0.5 milliLiter(s) IntraMuscular once  isosorbide   mononitrate ER Tablet (IMDUR) 30 milliGRAM(s) Oral daily  metoprolol tartrate 25 milliGRAM(s) Oral every 8 hours  multivitamin 1 Tablet(s) Oral daily  pantoprazole    Tablet 40 milliGRAM(s) Oral before breakfast  polyethylene glycol 3350 17 Gram(s) Oral daily  sodium chloride 0.9% lock flush 3 milliLiter(s) IV Push every 8 hours  tamsulosin 0.4 milliGRAM(s) Oral at bedtime          PHYSICAL EXAM    Subjective: "Good morning"  Neurology: alert and oriented x 3, nonfocal, no gross deficits  CV : S1/S2, no murmurs/rubs/gallops  Lungs: clear. RR easy, unlabored   Abdomen: soft, nontender, nondistended, positive bowel sounds, +bowel movement   Neg N/V/D   :  pt voiding without difficulty   Extremities: [place]; no edema, neg calf tenderness. PPP bilaterally, groins stable    Skin: chest wall without rashes/lesions      Plan of care discussed with Cardiothoracic Team at AM rounds.

## 2022-02-23 PROCEDURE — 99231 SBSQ HOSP IP/OBS SF/LOW 25: CPT

## 2022-02-23 RX ADMIN — PANTOPRAZOLE SODIUM 40 MILLIGRAM(S): 20 TABLET, DELAYED RELEASE ORAL at 05:35

## 2022-02-23 RX ADMIN — ENOXAPARIN SODIUM 40 MILLIGRAM(S): 100 INJECTION SUBCUTANEOUS at 18:13

## 2022-02-23 RX ADMIN — Medication 1 MILLIGRAM(S): at 13:17

## 2022-02-23 RX ADMIN — Medication 325 MILLIGRAM(S): at 21:56

## 2022-02-23 RX ADMIN — BUPROPION HYDROCHLORIDE 300 MILLIGRAM(S): 150 TABLET, EXTENDED RELEASE ORAL at 13:16

## 2022-02-23 RX ADMIN — SODIUM CHLORIDE 3 MILLILITER(S): 9 INJECTION INTRAMUSCULAR; INTRAVENOUS; SUBCUTANEOUS at 21:55

## 2022-02-23 RX ADMIN — ENOXAPARIN SODIUM 40 MILLIGRAM(S): 100 INJECTION SUBCUTANEOUS at 05:36

## 2022-02-23 RX ADMIN — Medication 325 MILLIGRAM(S): at 13:15

## 2022-02-23 RX ADMIN — Medication 1 TABLET(S): at 13:17

## 2022-02-23 RX ADMIN — Medication 325 MILLIGRAM(S): at 05:35

## 2022-02-23 RX ADMIN — SODIUM CHLORIDE 3 MILLILITER(S): 9 INJECTION INTRAMUSCULAR; INTRAVENOUS; SUBCUTANEOUS at 06:10

## 2022-02-23 RX ADMIN — SODIUM CHLORIDE 3 MILLILITER(S): 9 INJECTION INTRAMUSCULAR; INTRAVENOUS; SUBCUTANEOUS at 13:29

## 2022-02-23 RX ADMIN — ESCITALOPRAM OXALATE 10 MILLIGRAM(S): 10 TABLET, FILM COATED ORAL at 13:17

## 2022-02-23 RX ADMIN — ATORVASTATIN CALCIUM 80 MILLIGRAM(S): 80 TABLET, FILM COATED ORAL at 21:56

## 2022-02-23 RX ADMIN — Medication 25 MILLIGRAM(S): at 05:35

## 2022-02-23 RX ADMIN — TAMSULOSIN HYDROCHLORIDE 0.4 MILLIGRAM(S): 0.4 CAPSULE ORAL at 21:56

## 2022-02-23 RX ADMIN — Medication 500 MILLIGRAM(S): at 13:17

## 2022-02-23 RX ADMIN — ERYTHROPOIETIN 15000 UNIT(S): 10000 INJECTION, SOLUTION INTRAVENOUS; SUBCUTANEOUS at 10:52

## 2022-02-23 RX ADMIN — Medication 25 MILLIGRAM(S): at 13:17

## 2022-02-23 RX ADMIN — Medication 25 MILLIGRAM(S): at 21:56

## 2022-02-23 RX ADMIN — Medication 325 MILLIGRAM(S): at 13:16

## 2022-02-23 RX ADMIN — ISOSORBIDE MONONITRATE 30 MILLIGRAM(S): 60 TABLET, EXTENDED RELEASE ORAL at 13:17

## 2022-02-23 NOTE — DIETITIAN INITIAL EVALUATION ADULT. - PERTINENT MEDS FT
MEDICATIONS  (STANDING):  ascorbic acid 500 milliGRAM(s) Oral daily  aspirin enteric coated 325 milliGRAM(s) Oral daily  atorvastatin 80 milliGRAM(s) Oral at bedtime  buPROPion XL (24-Hour) . 300 milliGRAM(s) Oral daily  enoxaparin Injectable 40 milliGRAM(s) SubCutaneous two times a day  epoetin andrew-epbx (RETACRIT) Injectable 85696 Unit(s) SubCutaneous <User Schedule>  escitalopram 10 milliGRAM(s) Oral daily  ferrous    sulfate 325 milliGRAM(s) Oral three times a day  folic acid 1 milliGRAM(s) Oral daily  influenza   Vaccine 0.5 milliLiter(s) IntraMuscular once  isosorbide   mononitrate ER Tablet (IMDUR) 30 milliGRAM(s) Oral daily  metoprolol tartrate 25 milliGRAM(s) Oral every 8 hours  multivitamin 1 Tablet(s) Oral daily  pantoprazole    Tablet 40 milliGRAM(s) Oral before breakfast  polyethylene glycol 3350 17 Gram(s) Oral daily  sodium chloride 0.9% lock flush 3 milliLiter(s) IV Push every 8 hours  tamsulosin 0.4 milliGRAM(s) Oral at bedtime

## 2022-02-23 NOTE — DIETITIAN INITIAL EVALUATION ADULT. - ADD RECOMMEND
1) Will continue to monitor PO intake, weight, labs, skin, GI status and diet 2) Monitor blood glucose levels for need to restrict carbohydrates 3) Continue vitamin/mineral regimen per team

## 2022-02-23 NOTE — PROGRESS NOTE ADULT - ASSESSMENT
62 y/o Mormon M w/ PMH depression, HLD, HTN, CAD s/p stents (2015 Jan [facility], June [place]).   Presented to [facility] for CP/SOB, further workup output revealed stenosis of prior stents/ CAD  2/11   no chest pain   on coreq,  asa   ator  2/12 VSS: continue current medication asa/ statin/ bb; continue epogen and iron  2/13 VSS - rounds made w/ DR. Skaggs.  Dr. Aaron to see pt in am - no OR date as of yet  2/14 VSS; carotids neg sig stenosis; echo minimal MR/ neg AR ef 67%; continue asa/ statin/ bb; continue epogen   2/15 VSS Reports L chest "pressure" intermittently w/o radiation  several times daily  4/10  Await OR date- d/w DR. Aaron; continue to minimize blood work   2/16  Increase retacrit  3 x's weekly.  Rad artery sxac.  Urology  consult for h/o retention  2/17 VSS CP free Dr Goldberg to see patient today  2/18 Flomax for history retention  OR date to be determined  2/19 Painfree AM labs as per Dr Aaron  2/20 Painfree Await OR date  2/21     vs s  ambulating  HCT 44  2/22 VSS, awaiting OR date, continue optimization for surgery  2/23 VSS, scheduled for CABG Friday with Dr. Aaron, will check labs in AM.

## 2022-02-23 NOTE — DIETITIAN INITIAL EVALUATION ADULT. - ORAL INTAKE PTA/DIET HISTORY
visited pt at bedside this afternoon. confirms NKFA. with weight gain over the last few years. no vitamins/minerals noted in outpatient medications. no known history of DM, not monitoring carbohydrate intake or taking DM medication.

## 2022-02-23 NOTE — PROGRESS NOTE ADULT - SUBJECTIVE AND OBJECTIVE BOX
Subjective: Pt states "Hello" denies any CP or SOB. No acute events overnight.     Telemetry:  SR 60 - 80  Vital Signs Last 24 Hrs  T(C): 36.9 (02-23-22 @ 12:19), Max: 36.9 (02-22-22 @ 20:36)  T(F): 98.4 (02-23-22 @ 12:19), Max: 98.4 (02-22-22 @ 20:36)  HR: 75 (02-23-22 @ 12:19) (59 - 75)  BP: 134/86 (02-23-22 @ 12:19) (97/60 - 134/86)  RR: 18 (02-23-22 @ 12:19) (18 - 18)  SpO2: 98% (02-23-22 @ 12:19) (93% - 98%)             02-22 @ 07:01  -  02-23 @ 07:00  --------------------------------------------------------  IN: 980 mL / OUT: 1400 mL / NET: -420 mL      PHYSICAL EXAM  Neurology: A&Ox3, NAD  CV : RRR+S1S2  Lungs: Respirations non-labored, B/L BS CTA  Abdomen: Soft, NT/ND, +BSx4Q  : Voiding without difficulty  Extremities: B/L LE no edema, negative calf tenderness, +PP      MEDICATIONS  ascorbic acid 500 milliGRAM(s) Oral daily  aspirin enteric coated 325 milliGRAM(s) Oral daily  atorvastatin 80 milliGRAM(s) Oral at bedtime  buPROPion XL (24-Hour) . 300 milliGRAM(s) Oral daily  enoxaparin Injectable 40 milliGRAM(s) SubCutaneous two times a day  epoetin andrew-epbx (RETACRIT) Injectable 04728 Unit(s) SubCutaneous <User Schedule>  escitalopram 10 milliGRAM(s) Oral daily  ferrous    sulfate 325 milliGRAM(s) Oral three times a day  folic acid 1 milliGRAM(s) Oral daily  influenza   Vaccine 0.5 milliLiter(s) IntraMuscular once  isosorbide   mononitrate ER Tablet (IMDUR) 30 milliGRAM(s) Oral daily  metoprolol tartrate 25 milliGRAM(s) Oral every 8 hours  multivitamin 1 Tablet(s) Oral daily  pantoprazole    Tablet 40 milliGRAM(s) Oral before breakfast  polyethylene glycol 3350 17 Gram(s) Oral daily  sodium chloride 0.9% lock flush 3 milliLiter(s) IV Push every 8 hours  tamsulosin 0.4 milliGRAM(s) Oral at bedtime        Discussed with Cardiothoracic Team at AM rounds.

## 2022-02-23 NOTE — DIETITIAN INITIAL EVALUATION ADULT. - OTHER INFO
Pt states to have good PO intake. Denies nausea/vomiting/constipation/diarrhea. previous constipation which has resolved. no noted difficulty chewing / swallowing. Last bowel movement 2/19 per flow sheets.     Dosing weight: 214.5 pounds.   Patient endorses ~12 pound weight loss since admission, unsure as to why, has been eating well. No noted edema in flow sheets. ? accuracy of scales.   Per chart:  2/22 228 pounds  2/11 239.5 pounds  This indicates a 11.5 pound / 4.8% weight loss x 1.5 weeks. RD will continue to monitor trend.     Patient endorses a history of diet non compliance and weight gain PTA. Enjoys sweets, desserts. encouraged patient to decrease processed/added sugar consumed. Explained benefits of whole grains/fiber to reduce blood glucose spikes. Discussed half the plate being vegetables, a quarter of the plate protein and the other quarter of the plate carbohydrates (Whole grain). recommended 3 meals/day and to reduce snacking.    planned for CABG end of the week. discussed importance to consuming lean protein to healing potential incisions.

## 2022-02-24 LAB
ALBUMIN SERPL ELPH-MCNC: 4 G/DL — SIGNIFICANT CHANGE UP (ref 3.3–5)
ALP SERPL-CCNC: 74 U/L — SIGNIFICANT CHANGE UP (ref 40–120)
ALT FLD-CCNC: 113 U/L — HIGH (ref 10–45)
ANION GAP SERPL CALC-SCNC: 11 MMOL/L — SIGNIFICANT CHANGE UP (ref 5–17)
APTT BLD: 36.3 SEC — HIGH (ref 27.5–35.5)
AST SERPL-CCNC: 62 U/L — HIGH (ref 10–40)
BILIRUB SERPL-MCNC: 0.2 MG/DL — SIGNIFICANT CHANGE UP (ref 0.2–1.2)
BLD GP AB SCN SERPL QL: NEGATIVE — SIGNIFICANT CHANGE UP
BUN SERPL-MCNC: 19 MG/DL — SIGNIFICANT CHANGE UP (ref 7–23)
CALCIUM SERPL-MCNC: 9.4 MG/DL — SIGNIFICANT CHANGE UP (ref 8.4–10.5)
CHLORIDE SERPL-SCNC: 102 MMOL/L — SIGNIFICANT CHANGE UP (ref 96–108)
CO2 SERPL-SCNC: 24 MMOL/L — SIGNIFICANT CHANGE UP (ref 22–31)
CREAT SERPL-MCNC: 1.14 MG/DL — SIGNIFICANT CHANGE UP (ref 0.5–1.3)
GLUCOSE SERPL-MCNC: 104 MG/DL — HIGH (ref 70–99)
HCT VFR BLD CALC: 47.9 % — SIGNIFICANT CHANGE UP (ref 39–50)
HGB BLD-MCNC: 15.8 G/DL — SIGNIFICANT CHANGE UP (ref 13–17)
INR BLD: 1.07 RATIO — SIGNIFICANT CHANGE UP (ref 0.88–1.16)
MCHC RBC-ENTMCNC: 30.7 PG — SIGNIFICANT CHANGE UP (ref 27–34)
MCHC RBC-ENTMCNC: 33 GM/DL — SIGNIFICANT CHANGE UP (ref 32–36)
MCV RBC AUTO: 93 FL — SIGNIFICANT CHANGE UP (ref 80–100)
NRBC # BLD: 0 /100 WBCS — SIGNIFICANT CHANGE UP (ref 0–0)
PLATELET # BLD AUTO: 262 K/UL — SIGNIFICANT CHANGE UP (ref 150–400)
POTASSIUM SERPL-MCNC: 4.1 MMOL/L — SIGNIFICANT CHANGE UP (ref 3.5–5.3)
POTASSIUM SERPL-SCNC: 4.1 MMOL/L — SIGNIFICANT CHANGE UP (ref 3.5–5.3)
PROT SERPL-MCNC: 6.7 G/DL — SIGNIFICANT CHANGE UP (ref 6–8.3)
PROTHROM AB SERPL-ACNC: 12.8 SEC — SIGNIFICANT CHANGE UP (ref 10.5–13.4)
RBC # BLD: 5.15 M/UL — SIGNIFICANT CHANGE UP (ref 4.2–5.8)
RBC # FLD: 14.7 % — HIGH (ref 10.3–14.5)
RH IG SCN BLD-IMP: POSITIVE — SIGNIFICANT CHANGE UP
SARS-COV-2 RNA SPEC QL NAA+PROBE: SIGNIFICANT CHANGE UP
SODIUM SERPL-SCNC: 137 MMOL/L — SIGNIFICANT CHANGE UP (ref 135–145)
WBC # BLD: 6.18 K/UL — SIGNIFICANT CHANGE UP (ref 3.8–10.5)
WBC # FLD AUTO: 6.18 K/UL — SIGNIFICANT CHANGE UP (ref 3.8–10.5)

## 2022-02-24 PROCEDURE — 71045 X-RAY EXAM CHEST 1 VIEW: CPT | Mod: 26

## 2022-02-24 RX ORDER — CHLORHEXIDINE GLUCONATE 213 G/1000ML
1 SOLUTION TOPICAL ONCE
Refills: 0 | Status: COMPLETED | OUTPATIENT
Start: 2022-02-24 | End: 2022-02-24

## 2022-02-24 RX ORDER — GABAPENTIN 400 MG/1
200 CAPSULE ORAL ONCE
Refills: 0 | Status: COMPLETED | OUTPATIENT
Start: 2022-02-24 | End: 2022-02-25

## 2022-02-24 RX ORDER — CHLORHEXIDINE GLUCONATE 213 G/1000ML
15 SOLUTION TOPICAL ONCE
Refills: 0 | Status: COMPLETED | OUTPATIENT
Start: 2022-02-24 | End: 2022-02-25

## 2022-02-24 RX ORDER — CEFAZOLIN SODIUM 1 G
2000 VIAL (EA) INJECTION ONCE
Refills: 0 | Status: DISCONTINUED | OUTPATIENT
Start: 2022-02-24 | End: 2022-02-25

## 2022-02-24 RX ORDER — ACETAMINOPHEN 500 MG
1000 TABLET ORAL ONCE
Refills: 0 | Status: COMPLETED | OUTPATIENT
Start: 2022-02-24 | End: 2022-02-25

## 2022-02-24 RX ADMIN — SODIUM CHLORIDE 3 MILLILITER(S): 9 INJECTION INTRAMUSCULAR; INTRAVENOUS; SUBCUTANEOUS at 21:51

## 2022-02-24 RX ADMIN — Medication 325 MILLIGRAM(S): at 05:17

## 2022-02-24 RX ADMIN — ATORVASTATIN CALCIUM 80 MILLIGRAM(S): 80 TABLET, FILM COATED ORAL at 21:43

## 2022-02-24 RX ADMIN — Medication 325 MILLIGRAM(S): at 13:12

## 2022-02-24 RX ADMIN — Medication 1 TABLET(S): at 13:12

## 2022-02-24 RX ADMIN — TAMSULOSIN HYDROCHLORIDE 0.4 MILLIGRAM(S): 0.4 CAPSULE ORAL at 21:43

## 2022-02-24 RX ADMIN — ISOSORBIDE MONONITRATE 30 MILLIGRAM(S): 60 TABLET, EXTENDED RELEASE ORAL at 13:12

## 2022-02-24 RX ADMIN — Medication 25 MILLIGRAM(S): at 21:43

## 2022-02-24 RX ADMIN — Medication 25 MILLIGRAM(S): at 05:17

## 2022-02-24 RX ADMIN — Medication 25 MILLIGRAM(S): at 13:12

## 2022-02-24 RX ADMIN — ESCITALOPRAM OXALATE 10 MILLIGRAM(S): 10 TABLET, FILM COATED ORAL at 13:12

## 2022-02-24 RX ADMIN — Medication 325 MILLIGRAM(S): at 21:43

## 2022-02-24 RX ADMIN — CHLORHEXIDINE GLUCONATE 1 APPLICATION(S): 213 SOLUTION TOPICAL at 20:31

## 2022-02-24 RX ADMIN — BUPROPION HYDROCHLORIDE 300 MILLIGRAM(S): 150 TABLET, EXTENDED RELEASE ORAL at 13:11

## 2022-02-24 RX ADMIN — SODIUM CHLORIDE 3 MILLILITER(S): 9 INJECTION INTRAMUSCULAR; INTRAVENOUS; SUBCUTANEOUS at 13:33

## 2022-02-24 RX ADMIN — SODIUM CHLORIDE 3 MILLILITER(S): 9 INJECTION INTRAMUSCULAR; INTRAVENOUS; SUBCUTANEOUS at 05:17

## 2022-02-24 RX ADMIN — Medication 1 MILLIGRAM(S): at 13:11

## 2022-02-24 RX ADMIN — ENOXAPARIN SODIUM 40 MILLIGRAM(S): 100 INJECTION SUBCUTANEOUS at 05:17

## 2022-02-24 RX ADMIN — PANTOPRAZOLE SODIUM 40 MILLIGRAM(S): 20 TABLET, DELAYED RELEASE ORAL at 05:17

## 2022-02-24 RX ADMIN — Medication 500 MILLIGRAM(S): at 13:12

## 2022-02-24 NOTE — PROGRESS NOTE ADULT - ASSESSMENT
60 y/o [employer]ism M w/ PMH depression, HLD, HTN, CAD s/p stents (2015 Jan [facility], June [place]).   Presented to [facility] for CP/SOB, further workup output revealed stenosis of prior stents/ CAD  2/11   no chest pain   on coreq,  asa   ator  2/12 VSS: continue current medication asa/ statin/ bb; continue epogen and iron  2/13 VSS - rounds made w/ DR. Skaggs.  Dr. Aaron to see pt in am - no OR date as of yet  2/14 VSS; carotids neg sig stenosis; echo minimal MR/ neg AR ef 67%; continue asa/ statin/ bb; continue epogen   2/15 VSS Reports L chest "pressure" intermittently w/o radiation  several times daily  4/10  Await OR date- d/w DR. Aaron; continue to minimize blood work   2/16  Increase retacrit  3 x's weekly.  Rad artery sxac.  Urology  consult for h/o retention  2/17 VSS CP free Dr Goldberg to see patient today  2/18 Flomax for history retention  OR date to be determined  2/19 Painfree AM labs as per Dr Aaron  2/20 Painfree Await OR date  2/21     vs s  ambulating  HCT 44  2/22 VSS, awaiting OR date, continue optimization for surgery  2/23 VSS, scheduled for CABG Friday with Dr. Aaron, will check labs in AM.  2/24 NPO for CABG in am

## 2022-02-24 NOTE — PROGRESS NOTE ADULT - PROBLEM SELECTOR PLAN 2
Dr Goldberg Urology following
Dr Goldberg Urology following  Flomax .4 qHS    JARA TO REMAIN IN UNTIL MONDAY 2/28 PER UROLOGY
Dr Goldberg Urology following
Dr Goldberg Urology following
Dr Goldberg Urology following  Flomax .4 qHS
Dr Goldberg Urology following
Dr Goldberg Urology following  Flomax .4 qHS
Dr Goldberg Urology consulted

## 2022-02-24 NOTE — PROGRESS NOTE ADULT - SUBJECTIVE AND OBJECTIVE BOX
Cardiac Surgery Pre-op Note:  CC: Patient is a 61y old  Male who presents with a chief complaint of chest pain, SOB (23 Feb 2022 13:31)    Referring Physician:                                                                                             Surgeon: Dr. Aaron  Procedure: (Date) (Procedure) 2/25 cabg    Allergies: NKDA    HPI:  Pt is 62 y/o Mu-ism M w/ PMH depression, HLD, HTN, CAD s/p stents (2015 Jan [facility], [place], on Brilinta in past,  only now), inguinal hernia s/p repair, cholecystectomy (2012), and appendectomy (1970). Per pt on 1/22 began feeling L sided chest pressure and SOB, took an aspirin and felt better. Following day the pain returned and he went to [facility] for evaluation, where per pt serial troponins and EKG were done pt told they were negative and he was sent home. TTE was also done that day with mild hypokinesis of the inferior wall, LVEF 50-55%. His home cardiologist instructed him to go for a stress test, which he did on 2/2/22.   He then went for cardiac cath on 2/10, attempted catheterization of R wrist unsuccessful, cath proceeded in R groin. Pt was told that one of his stents was occluded and another was "down 50%".     Pt transferred to [facility] for CABG workup. He expressed interest in "bloodless" surgery due to his JW beliefs.  (11 Feb 2022 02:22)      PAST MEDICAL & SURGICAL HISTORY:  Anxiety and depression    Hyperlipidemia  Hypertension  CAD (coronary artery disease)  History of appendectomy  S/P cholecystectomy  S/P inguinal hernia repair    MEDICATIONS  (STANDING):  acetaminophen     Tablet .. 1000 milliGRAM(s) Oral once  ascorbic acid 500 milliGRAM(s) Oral daily  aspirin enteric coated 325 milliGRAM(s) Oral daily  atorvastatin 80 milliGRAM(s) Oral at bedtime  buPROPion XL (24-Hour) . 300 milliGRAM(s) Oral daily  ceFAZolin   IVPB 2000 milliGRAM(s) IV Intermittent once  chlorhexidine 0.12% Liquid 15 milliLiter(s) Swish and Spit once  chlorhexidine 4% Liquid 1 Application(s) Topical once  epoetin andrew-epbx (RETACRIT) Injectable 97776 Unit(s) SubCutaneous <User Schedule>  escitalopram 10 milliGRAM(s) Oral daily  ferrous    sulfate 325 milliGRAM(s) Oral three times a day  folic acid 1 milliGRAM(s) Oral daily  gabapentin 200 milliGRAM(s) Oral once  influenza   Vaccine 0.5 milliLiter(s) IntraMuscular once  isosorbide   mononitrate ER Tablet (IMDUR) 30 milliGRAM(s) Oral daily  metoprolol tartrate 25 milliGRAM(s) Oral every 8 hours  multivitamin 1 Tablet(s) Oral daily  pantoprazole    Tablet 40 milliGRAM(s) Oral before breakfast  polyethylene glycol 3350 17 Gram(s) Oral daily  sodium chloride 0.9% lock flush 3 milliLiter(s) IV Push every 8 hours  tamsulosin 0.4 milliGRAM(s) Oral at bedtime    Labs:                        15.8   6.18  )-----------( 262      ( 24 Feb 2022 06:10 )             47.9     137  |  102  |  19  ----------------------------<  104<H>  4.1   |  24  |  1.14    Ca    9.4      24 Feb 2022 06:03    TPro  6.7  /  Alb  4.0  /  TBili  0.2  /  DBili  x   /  AST  62<H>  /  ALT  113<H>  /  AlkPhos  74  02-24  PT/INR - ( 24 Feb 2022 06:10 )   PT: 12.8 sec;   INR: 1.07 ratio    PTT - ( 24 Feb 2022 06:10 )  PTT:36.3 sec  Blood Type: ABO Interpretation: O (02-24 @ 05:22)  HGB A1C: A1C with Estimated Average Glucose (02.11.22 @ 11:33)    A1C with Estimated Average Glucose Result: 6.4: Method: Immunoassay    Thyroid Panel:   MRSA:  / MSSA: MRSA/MSSA PCR (02.11.22 @ 08:38)    MRSA PCR Result.: NotDetec: MRSA/MSSA PCR assay is a qualitative in vitro diagnostic test for the  direct detection and differentiation of methicillin-resistant    CXR:     EKG: < from: 12 Lead ECG (02.18.22 @ 21:25) >  Diagnosis Line SINUS BRADYCARDIA  POSSIBLE INFERIOR INFARCT (CITED ON OR BEFORE 11-FEB-2022)  ABNORMAL ECG  WHEN COMPARED WITH ECG OF 02/15/2022    Carotid Duplex:  < from: VA Duplex Carotid, Bilat (02.11.22 @ 19:09) >  IMPRESSION:    1.  No significant hemodynamic stenosis of either carotid artery.    Measurement of carotid stenosis is based on velocity parameters that   correlate the residual internal carotid diameter with that of the more   distal vessel in accordance with a method such as the North American   Symptomatic Carotid Endarterectomy Trial (NASCET).    PFT's:    Echocardiogram: < from: TTE with Doppler (w/Cont) (02.11.22 @ 06:26) >  Conclusions:  1. Mitral annular calcification, otherwise normal mitral  valve. Minimal mitral regurgitation.  2. Normal trileaflet aortic valve. No aortic valve  regurgitation seen.  3. Endocardial visualization enhanced with intravenous  injection of Ultrasonic Enhancing Agent (Definity). Normal  left ventricular systolic function. No segmental wall  motion abnormalities. No left ventricular thrombus.  4. Normal diastolic function  5. Right ventricular enlargement with normal right  ventricular systolic function.    Cardiac catheterization: tvd    Gen: WN/WD NAD  Neuro: AAOx3, nonfocal  Pulm: CTA B/L  CV: RRR, S1S2 +systolic murmur  Abd: Soft, NT, ND +BS  Ext: No edema, + peripheral pulses      Pt has AICD/PPM [ ] Yes  [x ] No             Brand Name:  Pre-op Beta Blocker ordered within 24 hrs of surgery (CABG ONLY)?  [x ] Yes  [ ] No  If not, Why?  Type & Cross  [x ] Yes  [ ] No  NPO after Midnight [x ] Yes  [ ] No  Pre-op ABX ordered, to be taped on chart:  [ x] Yes  [ ] No     Hibiclens/Peridex ordered [x ] Yes  [ ] No  Intraop on Hold: PRBCs, CXR, THAI [x ]   Consent obtained  [x ] Yes  [ ] No   Cardiac Surgery Pre-op Note:  CC: Patient is a 61y old  Male who presents with a chief complaint of chest pain, SOB (23 Feb 2022 13:31)    Referring Physician:                                                                                             Surgeon: Dr. Aaron  Procedure: (Date) (Procedure) 2/25 cabg    Allergies: NKDA    HPI:  Pt is 62 y/o Hoah[employer] M w/ PMH depression, HLD, HTN, CAD s/[place] st[place] [facility] Bril[place] in past,  only now), inguinal hernia s/p repair, cholecystectomy (2012), and appendectomy (1970). Per pt on 1/22 began feeling L sided chest pressure and SOB, took an aspirin and felt better. Following day the pain returned and he went to [facility] for evaluation, where per pt serial troponins and EKG were done pt told they were negative and he was sent home. TTE was also done that day with mild hypokinesis of the inferior wall, LVEF 50-55%. His home cardiologist instructed him to go for a stress test, which he did on 2/2/22.   He then went for cardiac cath on 2/10, attempted catheterization of R wrist unsuccessful, cath proceeded in R groin. Pt was told that one of his stents was occluded and another was "down 50%".     Pt transferred to [facility] for CABG workup. He expressed interest in "bloodless" surgery due to his JW beliefs.  (11 Feb 2022 02:22)      PAST MEDICAL & SURGICAL HISTORY:  Anxiety and depression    Hyperlipidemia  Hypertension  CAD (coronary artery disease)  History of appendectomy  S/P cholecystectomy  S/P inguinal hernia repair    MEDICATIONS  (STANDING):  acetaminophen     Tablet .. 1000 milliGRAM(s) Oral once  ascorbic acid 500 milliGRAM(s) Oral daily  aspirin enteric coated 325 milliGRAM(s) Oral daily  atorvastatin 80 milliGRAM(s) Oral at bedtime  buPROPion XL (24-Hour) . 300 milliGRAM(s) Oral daily  ceFAZolin   IVPB 2000 milliGRAM(s) IV Intermittent once  chlorhexidine 0.12% Liquid 15 milliLiter(s) Swish and Spit once  chlorhexidine 4% Liquid 1 Application(s) Topical once  epoetin andrew-epbx (RETACRIT) Injectable 08583 Unit(s) SubCutaneous <User Schedule>  escitalopram 10 milliGRAM(s) Oral daily  ferrous    sulfate 325 milliGRAM(s) Oral three times a day  folic acid 1 milliGRAM(s) Oral daily  gabapentin 200 milliGRAM(s) Oral once  influenza   Vaccine 0.5 milliLiter(s) IntraMuscular once  isosorbide   mononitrate ER Tablet (IMDUR) 30 milliGRAM(s) Oral daily  metoprolol tartrate 25 milliGRAM(s) Oral every 8 hours  multivitamin 1 Tablet(s) Oral daily  pantoprazole    Tablet 40 milliGRAM(s) Oral before breakfast  polyethylene glycol 3350 17 Gram(s) Oral daily  sodium chloride 0.9% lock flush 3 milliLiter(s) IV Push every 8 hours  tamsulosin 0.4 milliGRAM(s) Oral at bedtime    Labs:                        15.8   6.18  )-----------( 262      ( 24 Feb 2022 06:10 )             47.9     137  |  102  |  19  ----------------------------<  104<H>  4.1   |  24  |  1.14    Ca    9.4      24 Feb 2022 06:03    TPro  6.7  /  Alb  4.0  /  TBili  0.2  /  DBili  x   /  AST  62<H>  /  ALT  113<H>  /  AlkPhos  74  02-24  PT/INR - ( 24 Feb 2022 06:10 )   PT: 12.8 sec;   INR: 1.07 ratio    PTT - ( 24 Feb 2022 06:10 )  PTT:36.3 sec  Blood Type: ABO Interpretation: O (02-24 @ 05:22)  HGB A1C: A1C with Estimated Average Glucose (02.11.22 @ 11:33)    A1C with Estimated Average Glucose Result: 6.4: Method: Immunoassay    Thyroid Panel: tsh 2.66  MRSA:  / MSSA: MRSA/MSSA PCR (02.11.22 @ 08:38)    MRSA PCR Result.: NotDetec: MRSA/MSSA PCR assay is a qualitative in vitro diagnostic test for the  direct detection and differentiation of methicillin-resistant    CXR:     EKG: < from: 12 Lead ECG (02.18.22 @ 21:25) >  Diagnosis Line SINUS BRADYCARDIA  POSSIBLE INFERIOR INFARCT (CITED ON OR BEFORE 11-FEB-2022)  ABNORMAL ECG  WHEN COMPARED WITH ECG OF 02/15/2022    Carotid Duplex:  < from: VA Duplex Carotid, Bilat (02.11.22 @ 19:09) >  IMPRESSION:    1.  No significant hemodynamic stenosis of either carotid artery.    Measurement of carotid stenosis is based on velocity parameters that   correlate the residual internal carotid diameter with that of the more   distal vessel in accordance with a method such as the North American   Symptomatic Carotid Endarterectomy Trial (NASCET).    PFT's:    Echocardiogram: < from: TTE with Doppler (w/Cont) (02.11.22 @ 06:26) >  Conclusions:  1. Mitral annular calcification, otherwise normal mitral  valve. Minimal mitral regurgitation.  2. Normal trileaflet aortic valve. No aortic valve  regurgitation seen.  3. Endocardial visualization enhanced with intravenous  injection of Ultrasonic Enhancing Agent (Definity). Normal  left ventricular systolic function. No segmental wall  motion abnormalities. No left ventricular thrombus.  4. Normal diastolic function  5. Right ventricular enlargement with normal right  ventricular systolic function.    Cardiac catheterization: tvd    Gen: WN/WD NAD  Neuro: AAOx3, nonfocal  Pulm: CTA B/L  CV: RRR, S1S2 +systolic murmur  Abd: Soft, NT, ND +BS  Ext: No edema, + peripheral pulses      Pt has AICD/PPM [ ] Yes  [x ] No             Brand Name:  Pre-op Beta Blocker ordered within 24 hrs of surgery (CABG ONLY)?  [x ] Yes  [ ] No  If not, Why?  Type & Cross  [x ] Yes  [ ] No  NPO after Midnight [x ] Yes  [ ] No  Pre-op ABX ordered, to be taped on chart:  [ x] Yes  [ ] No     Hibiclens/Peridex ordered [x ] Yes  [ ] No  Intraop on Hold: PRBCs, CXR, THAI [x ]   Consent obtained  [x ] Yes  [ ] No

## 2022-02-25 LAB
BASE EXCESS BLDV CALC-SCNC: -4.4 MMOL/L — LOW (ref -2–2)
BASE EXCESS BLDV CALC-SCNC: 3.1 MMOL/L — HIGH (ref -2–2)
BASE EXCESS BLDV CALC-SCNC: 5.6 MMOL/L — HIGH (ref -2–2)
BLOOD GAS VENOUS - CREATININE: SIGNIFICANT CHANGE UP MG/DL (ref 0.5–1.3)
CA-I SERPL-SCNC: 0.85 MMOL/L — LOW (ref 1.15–1.33)
CA-I SERPL-SCNC: 0.93 MMOL/L — LOW (ref 1.15–1.33)
CA-I SERPL-SCNC: 1.38 MMOL/L — HIGH (ref 1.15–1.33)
CHLORIDE BLDV-SCNC: 100 MMOL/L — SIGNIFICANT CHANGE UP (ref 96–108)
CHLORIDE BLDV-SCNC: 101 MMOL/L — SIGNIFICANT CHANGE UP (ref 96–108)
CHLORIDE BLDV-SCNC: 98 MMOL/L — SIGNIFICANT CHANGE UP (ref 96–108)
CO2 BLDV-SCNC: 22 MMOL/L — SIGNIFICANT CHANGE UP (ref 22–26)
CO2 BLDV-SCNC: 28 MMOL/L — HIGH (ref 22–26)
CO2 BLDV-SCNC: 31 MMOL/L — HIGH (ref 22–26)
GAS PNL BLDA: SIGNIFICANT CHANGE UP
GAS PNL BLDV: 133 MMOL/L — LOW (ref 136–145)
GAS PNL BLDV: 136 MMOL/L — SIGNIFICANT CHANGE UP (ref 136–145)
GAS PNL BLDV: 137 MMOL/L — SIGNIFICANT CHANGE UP (ref 136–145)
GAS PNL BLDV: SIGNIFICANT CHANGE UP
GLUCOSE BLDC GLUCOMTR-MCNC: 122 MG/DL — HIGH (ref 70–99)
GLUCOSE BLDC GLUCOMTR-MCNC: 129 MG/DL — HIGH (ref 70–99)
GLUCOSE BLDC GLUCOMTR-MCNC: 130 MG/DL — HIGH (ref 70–99)
GLUCOSE BLDC GLUCOMTR-MCNC: 134 MG/DL — HIGH (ref 70–99)
GLUCOSE BLDC GLUCOMTR-MCNC: 137 MG/DL — HIGH (ref 70–99)
GLUCOSE BLDC GLUCOMTR-MCNC: 145 MG/DL — HIGH (ref 70–99)
GLUCOSE BLDV-MCNC: 124 MG/DL — HIGH (ref 70–99)
GLUCOSE BLDV-MCNC: 139 MG/DL — HIGH (ref 70–99)
GLUCOSE BLDV-MCNC: 156 MG/DL — HIGH (ref 70–99)
HCO3 BLDV-SCNC: 21 MMOL/L — LOW (ref 22–29)
HCO3 BLDV-SCNC: 27 MMOL/L — SIGNIFICANT CHANGE UP (ref 22–29)
HCO3 BLDV-SCNC: 30 MMOL/L — HIGH (ref 22–29)
HCT VFR BLDA CALC: 34 % — LOW (ref 39–51)
HCT VFR BLDA CALC: 34 % — LOW (ref 39–51)
HCT VFR BLDA CALC: 37 % — LOW (ref 39–51)
HGB BLD CALC-MCNC: 11.2 G/DL — LOW (ref 12.6–17.4)
HGB BLD CALC-MCNC: 11.3 G/DL — LOW (ref 12.6–17.4)
HGB BLD CALC-MCNC: 12.4 G/DL — LOW (ref 12.6–17.4)
LACTATE BLDV-MCNC: 0.6 MMOL/L — LOW (ref 0.7–2)
LACTATE BLDV-MCNC: 0.9 MMOL/L — SIGNIFICANT CHANGE UP (ref 0.7–2)
LACTATE BLDV-MCNC: 1 MMOL/L — SIGNIFICANT CHANGE UP (ref 0.7–2)
PCO2 BLDV: 38 MMHG — LOW (ref 42–55)
PCO2 BLDV: 39 MMHG — LOW (ref 42–55)
PCO2 BLDV: 41 MMHG — LOW (ref 42–55)
PH BLDV: 7.34 — SIGNIFICANT CHANGE UP (ref 7.32–7.43)
PH BLDV: 7.46 — HIGH (ref 7.32–7.43)
PH BLDV: 7.47 — HIGH (ref 7.32–7.43)
PO2 BLDV: 104 MMHG — HIGH (ref 25–45)
PO2 BLDV: 138 MMHG — HIGH (ref 25–45)
PO2 BLDV: 53 MMHG — HIGH (ref 25–45)
POTASSIUM BLDV-SCNC: 4.5 MMOL/L — SIGNIFICANT CHANGE UP (ref 3.5–5.1)
POTASSIUM BLDV-SCNC: 5.1 MMOL/L — SIGNIFICANT CHANGE UP (ref 3.5–5.1)
POTASSIUM BLDV-SCNC: 5.1 MMOL/L — SIGNIFICANT CHANGE UP (ref 3.5–5.1)
SAO2 % BLDV: 100 % — HIGH (ref 67–88)
SAO2 % BLDV: 90 % — HIGH (ref 67–88)
SAO2 % BLDV: 99.5 % — HIGH (ref 67–88)

## 2022-02-25 PROCEDURE — 99024 POSTOP FOLLOW-UP VISIT: CPT

## 2022-02-25 PROCEDURE — 33517 CABG ARTERY-VEIN SINGLE: CPT

## 2022-02-25 PROCEDURE — 99291 CRITICAL CARE FIRST HOUR: CPT

## 2022-02-25 PROCEDURE — 93010 ELECTROCARDIOGRAM REPORT: CPT | Mod: 76

## 2022-02-25 PROCEDURE — 33533 CABG ARTERIAL SINGLE: CPT

## 2022-02-25 PROCEDURE — 71045 X-RAY EXAM CHEST 1 VIEW: CPT | Mod: 26

## 2022-02-25 DEVICE — LIGATING CLIPS WECK HORIZON SMALL-WIDE (RED) 24: Type: IMPLANTABLE DEVICE | Status: FUNCTIONAL

## 2022-02-25 DEVICE — CANNULA AORTIC PERFUSION EZ GLIDE STRAIGHT 21FR X 35CM NON-VENTED: Type: IMPLANTABLE DEVICE | Status: FUNCTIONAL

## 2022-02-25 DEVICE — CANNULA VESSEL 3MM BLUNT TIP CLEAR 1-WAY VALVE: Type: IMPLANTABLE DEVICE | Status: FUNCTIONAL

## 2022-02-25 DEVICE — SHUNT FLO-THRU INTRALUMINAL 1MM X 18MM: Type: IMPLANTABLE DEVICE | Status: FUNCTIONAL

## 2022-02-25 DEVICE — SURGICEL FIBRILLAR 2 X 4": Type: IMPLANTABLE DEVICE | Status: FUNCTIONAL

## 2022-02-25 DEVICE — KIT A-LINE 1LUM 20G X 12CM SAFE KIT: Type: IMPLANTABLE DEVICE | Status: FUNCTIONAL

## 2022-02-25 DEVICE — CANNULA ARTERIOTOMY 2MM X 1.3": Type: IMPLANTABLE DEVICE | Status: FUNCTIONAL

## 2022-02-25 DEVICE — CANNULA VENOUS 2 STAGE LIGHTHOUSE TIP 28-38FR X 1/2" NON-VENTED: Type: IMPLANTABLE DEVICE | Status: FUNCTIONAL

## 2022-02-25 DEVICE — KIT CVC 2LUM MAC 9FR CHG: Type: IMPLANTABLE DEVICE | Status: FUNCTIONAL

## 2022-02-25 DEVICE — CANNULA AORTIC ROOT 14G X 14CM FLANGED: Type: IMPLANTABLE DEVICE | Status: FUNCTIONAL

## 2022-02-25 DEVICE — MEDIASTINAL CATH DRAIN 9MM: Type: IMPLANTABLE DEVICE | Status: FUNCTIONAL

## 2022-02-25 DEVICE — LIGATING CLIPS WECK HORIZON MEDIUM (BLUE) 24: Type: IMPLANTABLE DEVICE | Status: FUNCTIONAL

## 2022-02-25 DEVICE — SURGICEL 2 X 14": Type: IMPLANTABLE DEVICE | Status: FUNCTIONAL

## 2022-02-25 DEVICE — CANNULA RCSP 15FR X 12".5" MANUAL-INFLATE CUFF WITH GUIDEWIRE STYLET: Type: IMPLANTABLE DEVICE | Status: FUNCTIONAL

## 2022-02-25 DEVICE — CLIP APPLIER COVIDIEN SURGICLIP III 9" SM: Type: IMPLANTABLE DEVICE | Status: FUNCTIONAL

## 2022-02-25 RX ORDER — GABAPENTIN 400 MG/1
100 CAPSULE ORAL EVERY 8 HOURS
Refills: 0 | Status: COMPLETED | OUTPATIENT
Start: 2022-02-25 | End: 2022-03-02

## 2022-02-25 RX ORDER — DEXTROSE 50 % IN WATER 50 %
50 SYRINGE (ML) INTRAVENOUS
Refills: 0 | Status: DISCONTINUED | OUTPATIENT
Start: 2022-02-25 | End: 2022-03-02

## 2022-02-25 RX ORDER — CHLORHEXIDINE GLUCONATE 213 G/1000ML
1 SOLUTION TOPICAL DAILY
Refills: 0 | Status: DISCONTINUED | OUTPATIENT
Start: 2022-02-25 | End: 2022-02-28

## 2022-02-25 RX ORDER — ACETAMINOPHEN 500 MG
650 TABLET ORAL EVERY 6 HOURS
Refills: 0 | Status: DISCONTINUED | OUTPATIENT
Start: 2022-02-28 | End: 2022-03-02

## 2022-02-25 RX ORDER — FENTANYL CITRATE 50 UG/ML
25 INJECTION INTRAVENOUS ONCE
Refills: 0 | Status: DISCONTINUED | OUTPATIENT
Start: 2022-02-25 | End: 2022-02-25

## 2022-02-25 RX ORDER — PANTOPRAZOLE SODIUM 20 MG/1
40 TABLET, DELAYED RELEASE ORAL DAILY
Refills: 0 | Status: DISCONTINUED | OUTPATIENT
Start: 2022-02-25 | End: 2022-02-26

## 2022-02-25 RX ORDER — ACETAMINOPHEN 500 MG
650 TABLET ORAL EVERY 6 HOURS
Refills: 0 | Status: COMPLETED | OUTPATIENT
Start: 2022-02-25 | End: 2022-02-28

## 2022-02-25 RX ORDER — NOREPINEPHRINE BITARTRATE/D5W 8 MG/250ML
0.02 PLASTIC BAG, INJECTION (ML) INTRAVENOUS
Qty: 8 | Refills: 0 | Status: DISCONTINUED | OUTPATIENT
Start: 2022-02-25 | End: 2022-02-26

## 2022-02-25 RX ORDER — TAMSULOSIN HYDROCHLORIDE 0.4 MG/1
0.4 CAPSULE ORAL DAILY
Refills: 0 | Status: DISCONTINUED | OUTPATIENT
Start: 2022-02-25 | End: 2022-03-02

## 2022-02-25 RX ORDER — SODIUM CHLORIDE 9 MG/ML
250 INJECTION, SOLUTION INTRAVENOUS ONCE
Refills: 0 | Status: COMPLETED | OUTPATIENT
Start: 2022-02-25 | End: 2022-02-25

## 2022-02-25 RX ORDER — DEXTROSE 50 % IN WATER 50 %
25 SYRINGE (ML) INTRAVENOUS
Refills: 0 | Status: DISCONTINUED | OUTPATIENT
Start: 2022-02-25 | End: 2022-02-26

## 2022-02-25 RX ORDER — HYDROMORPHONE HYDROCHLORIDE 2 MG/ML
0.5 INJECTION INTRAMUSCULAR; INTRAVENOUS; SUBCUTANEOUS ONCE
Refills: 0 | Status: DISCONTINUED | OUTPATIENT
Start: 2022-02-25 | End: 2022-02-25

## 2022-02-25 RX ORDER — POTASSIUM CHLORIDE 20 MEQ
10 PACKET (EA) ORAL
Refills: 0 | Status: DISCONTINUED | OUTPATIENT
Start: 2022-02-25 | End: 2022-02-27

## 2022-02-25 RX ORDER — OXYCODONE HYDROCHLORIDE 5 MG/1
5 TABLET ORAL EVERY 4 HOURS
Refills: 0 | Status: DISCONTINUED | OUTPATIENT
Start: 2022-02-25 | End: 2022-03-02

## 2022-02-25 RX ORDER — SENNA PLUS 8.6 MG/1
2 TABLET ORAL AT BEDTIME
Refills: 0 | Status: DISCONTINUED | OUTPATIENT
Start: 2022-02-26 | End: 2022-03-02

## 2022-02-25 RX ORDER — AMIODARONE HYDROCHLORIDE 400 MG/1
400 TABLET ORAL
Refills: 0 | Status: COMPLETED | OUTPATIENT
Start: 2022-02-25 | End: 2022-02-28

## 2022-02-25 RX ORDER — HYDROMORPHONE HYDROCHLORIDE 2 MG/ML
0.5 INJECTION INTRAMUSCULAR; INTRAVENOUS; SUBCUTANEOUS EVERY 6 HOURS
Refills: 0 | Status: DISCONTINUED | OUTPATIENT
Start: 2022-02-25 | End: 2022-02-26

## 2022-02-25 RX ORDER — ASCORBIC ACID 60 MG
500 TABLET,CHEWABLE ORAL
Refills: 0 | Status: COMPLETED | OUTPATIENT
Start: 2022-02-25 | End: 2022-03-02

## 2022-02-25 RX ORDER — POTASSIUM CHLORIDE 20 MEQ
10 PACKET (EA) ORAL
Refills: 0 | Status: COMPLETED | OUTPATIENT
Start: 2022-02-25 | End: 2022-02-25

## 2022-02-25 RX ORDER — CEFUROXIME AXETIL 250 MG
1500 TABLET ORAL EVERY 8 HOURS
Refills: 0 | Status: COMPLETED | OUTPATIENT
Start: 2022-02-25 | End: 2022-02-27

## 2022-02-25 RX ORDER — INSULIN HUMAN 100 [IU]/ML
3 INJECTION, SOLUTION SUBCUTANEOUS
Qty: 100 | Refills: 0 | Status: DISCONTINUED | OUTPATIENT
Start: 2022-02-25 | End: 2022-02-27

## 2022-02-25 RX ORDER — SODIUM CHLORIDE 9 MG/ML
750 INJECTION, SOLUTION INTRAVENOUS ONCE
Refills: 0 | Status: COMPLETED | OUTPATIENT
Start: 2022-02-25 | End: 2022-02-25

## 2022-02-25 RX ORDER — OXYCODONE HYDROCHLORIDE 5 MG/1
10 TABLET ORAL EVERY 4 HOURS
Refills: 0 | Status: DISCONTINUED | OUTPATIENT
Start: 2022-02-25 | End: 2022-03-02

## 2022-02-25 RX ORDER — MEPERIDINE HYDROCHLORIDE 50 MG/ML
25 INJECTION INTRAMUSCULAR; INTRAVENOUS; SUBCUTANEOUS ONCE
Refills: 0 | Status: DISCONTINUED | OUTPATIENT
Start: 2022-02-25 | End: 2022-02-26

## 2022-02-25 RX ORDER — HYDROMORPHONE HYDROCHLORIDE 2 MG/ML
1 INJECTION INTRAMUSCULAR; INTRAVENOUS; SUBCUTANEOUS ONCE
Refills: 0 | Status: DISCONTINUED | OUTPATIENT
Start: 2022-02-25 | End: 2022-02-25

## 2022-02-25 RX ORDER — POLYETHYLENE GLYCOL 3350 17 G/17G
17 POWDER, FOR SOLUTION ORAL DAILY
Refills: 0 | Status: DISCONTINUED | OUTPATIENT
Start: 2022-02-26 | End: 2022-03-02

## 2022-02-25 RX ORDER — SODIUM CHLORIDE 9 MG/ML
500 INJECTION INTRAMUSCULAR; INTRAVENOUS; SUBCUTANEOUS ONCE
Refills: 0 | Status: DISCONTINUED | OUTPATIENT
Start: 2022-02-25 | End: 2022-02-25

## 2022-02-25 RX ORDER — CHLORHEXIDINE GLUCONATE 213 G/1000ML
5 SOLUTION TOPICAL
Refills: 0 | Status: DISCONTINUED | OUTPATIENT
Start: 2022-02-25 | End: 2022-02-26

## 2022-02-25 RX ORDER — SODIUM CHLORIDE 9 MG/ML
1000 INJECTION INTRAMUSCULAR; INTRAVENOUS; SUBCUTANEOUS
Refills: 0 | Status: DISCONTINUED | OUTPATIENT
Start: 2022-02-25 | End: 2022-02-27

## 2022-02-25 RX ORDER — ALBUMIN HUMAN 25 %
250 VIAL (ML) INTRAVENOUS ONCE
Refills: 0 | Status: COMPLETED | OUTPATIENT
Start: 2022-02-25 | End: 2022-02-25

## 2022-02-25 RX ORDER — DEXMEDETOMIDINE HYDROCHLORIDE IN 0.9% SODIUM CHLORIDE 4 UG/ML
0.5 INJECTION INTRAVENOUS
Qty: 200 | Refills: 0 | Status: DISCONTINUED | OUTPATIENT
Start: 2022-02-25 | End: 2022-02-26

## 2022-02-25 RX ADMIN — HYDROMORPHONE HYDROCHLORIDE 0.5 MILLIGRAM(S): 2 INJECTION INTRAMUSCULAR; INTRAVENOUS; SUBCUTANEOUS at 14:25

## 2022-02-25 RX ADMIN — HYDROMORPHONE HYDROCHLORIDE 1 MILLIGRAM(S): 2 INJECTION INTRAMUSCULAR; INTRAVENOUS; SUBCUTANEOUS at 20:00

## 2022-02-25 RX ADMIN — CHLORHEXIDINE GLUCONATE 5 MILLILITER(S): 213 SOLUTION TOPICAL at 17:25

## 2022-02-25 RX ADMIN — Medication 125 MILLILITER(S): at 21:55

## 2022-02-25 RX ADMIN — SODIUM CHLORIDE 500 MILLILITER(S): 9 INJECTION, SOLUTION INTRAVENOUS at 14:38

## 2022-02-25 RX ADMIN — HYDROMORPHONE HYDROCHLORIDE 1 MILLIGRAM(S): 2 INJECTION INTRAMUSCULAR; INTRAVENOUS; SUBCUTANEOUS at 19:21

## 2022-02-25 RX ADMIN — HYDROMORPHONE HYDROCHLORIDE 0.5 MILLIGRAM(S): 2 INJECTION INTRAMUSCULAR; INTRAVENOUS; SUBCUTANEOUS at 17:40

## 2022-02-25 RX ADMIN — PANTOPRAZOLE SODIUM 40 MILLIGRAM(S): 20 TABLET, DELAYED RELEASE ORAL at 17:25

## 2022-02-25 RX ADMIN — Medication 100 MILLIGRAM(S): at 23:36

## 2022-02-25 RX ADMIN — Medication 100 MILLIGRAM(S): at 16:10

## 2022-02-25 RX ADMIN — GABAPENTIN 200 MILLIGRAM(S): 400 CAPSULE ORAL at 06:42

## 2022-02-25 RX ADMIN — SODIUM CHLORIDE 1500 MILLILITER(S): 9 INJECTION, SOLUTION INTRAVENOUS at 14:00

## 2022-02-25 RX ADMIN — Medication 25 MILLIGRAM(S): at 06:41

## 2022-02-25 RX ADMIN — HYDROMORPHONE HYDROCHLORIDE 0.5 MILLIGRAM(S): 2 INJECTION INTRAMUSCULAR; INTRAVENOUS; SUBCUTANEOUS at 14:40

## 2022-02-25 RX ADMIN — PANTOPRAZOLE SODIUM 40 MILLIGRAM(S): 20 TABLET, DELAYED RELEASE ORAL at 06:41

## 2022-02-25 RX ADMIN — Medication 100 MILLIEQUIVALENT(S): at 15:06

## 2022-02-25 RX ADMIN — Medication 325 MILLIGRAM(S): at 06:41

## 2022-02-25 RX ADMIN — CHLORHEXIDINE GLUCONATE 15 MILLILITER(S): 213 SOLUTION TOPICAL at 06:42

## 2022-02-25 RX ADMIN — FENTANYL CITRATE 25 MICROGRAM(S): 50 INJECTION INTRAVENOUS at 15:45

## 2022-02-25 RX ADMIN — Medication 1000 MILLIGRAM(S): at 06:41

## 2022-02-25 RX ADMIN — Medication 100 MILLIEQUIVALENT(S): at 14:00

## 2022-02-25 RX ADMIN — HYDROMORPHONE HYDROCHLORIDE 0.5 MILLIGRAM(S): 2 INJECTION INTRAMUSCULAR; INTRAVENOUS; SUBCUTANEOUS at 15:20

## 2022-02-25 RX ADMIN — FENTANYL CITRATE 25 MICROGRAM(S): 50 INJECTION INTRAVENOUS at 16:00

## 2022-02-25 RX ADMIN — Medication 1000 MILLIGRAM(S): at 06:56

## 2022-02-25 RX ADMIN — HYDROMORPHONE HYDROCHLORIDE 0.5 MILLIGRAM(S): 2 INJECTION INTRAMUSCULAR; INTRAVENOUS; SUBCUTANEOUS at 15:05

## 2022-02-25 RX ADMIN — HYDROMORPHONE HYDROCHLORIDE 0.5 MILLIGRAM(S): 2 INJECTION INTRAMUSCULAR; INTRAVENOUS; SUBCUTANEOUS at 17:25

## 2022-02-25 RX ADMIN — SODIUM CHLORIDE 3 MILLILITER(S): 9 INJECTION INTRAMUSCULAR; INTRAVENOUS; SUBCUTANEOUS at 06:49

## 2022-02-25 NOTE — PRE-ANESTHESIA EVALUATION ADULT - NSANTHOSAYNRD_GEN_A_CORE
No. JESUSITA screening performed.  STOP BANG Legend: 0-2 = LOW Risk; 3-4 = INTERMEDIATE Risk; 5-8 = HIGH Risk

## 2022-02-25 NOTE — PRE-ANESTHESIA EVALUATION ADULT - NSANTHADDINFOFT_GEN_ALL_CORE
Patient is a [employer] and had already discussed which products he would accept with the surgeon. This discussion was noted on a form in the patient's chart. This documentation was reviewed with the patient prior to surgery who confirmed his wishes and did not have any additional questions.

## 2022-02-25 NOTE — PROGRESS NOTE ADULT - SUBJECTIVE AND OBJECTIVE BOX
MICHAEL RANDHAWA  MRN-[id number]  Patient is a 61y old  Male who presents with a chief complaint of chest pain, SOB (25 Feb 2022 14:22)    HPI:  Pt is 60 y/o [employer] M w/ PMH depression, HLD, HTN, CAD s/p stents (2015 Jan U, June [place], on Brilinta in past,  only now), inguinal hernia s/p repair, cholecystectomy (2012), and appendectomy (1970). Per pt on 1/22 began feeling L sided chest pressure and SOB, took an aspirin and felt better. Following day the pain returned and he went to [facility] for evaluation, where per pt serial troponins and EKG were done pt told they were negative and he was sent home. TTE was also done that day with mild hypokinesis of the inferior wall, LVEF 50-55%. His home cardiologist instructed him to go for a stress test, which he did on 2/2/22.   He then went for cardiac cath on 2/10, attempted catheterization of R wrist unsuccessful, cath proceeded in R groin. Pt was told that one of his stents was occluded and another was "down 50%".     Pt transferred to [facility] for CABG workup. He expressed interest in "bloodless" surgery due to his JW beliefs.  (11 Feb 2022 02:22)      Surgery/Hospital course:  2/25 C2L     Vital Signs Last 24 Hrs  T(C): 35.2 (25 Feb 2022 15:00), Max: 36.7 (24 Feb 2022 20:09)  T(F): 95.4 (25 Feb 2022 15:00), Max: 98 (24 Feb 2022 20:09)  HR: 79 (25 Feb 2022 17:45) (61 - 82)  BP: 113/71 (25 Feb 2022 07:00) (101/56 - 117/72)  BP(mean): --  RR: 15 (25 Feb 2022 17:45) (8 - 21)  SpO2: 99% (25 Feb 2022 17:45) (93% - 100%)  ============================I/O===========================  I&O's Summary    24 Feb 2022 07:01  -  25 Feb 2022 07:00  --------------------------------------------------------  IN: 890 mL / OUT: 1550 mL / NET: -660 mL    25 Feb 2022 07:01  -  25 Feb 2022 18:13  --------------------------------------------------------  IN: 1183.8 mL / OUT: 1070 mL / NET: 113.8 mL      ============================ LABS =========================                        15.8   6.18  )-----------( 262      ( 24 Feb 2022 06:10 )             47.9     02-24    137  |  102  |  19  ----------------------------<  104<H>  4.1   |  24  |  1.14    Ca    9.4      24 Feb 2022 06:03    TPro  6.7  /  Alb  4.0  /  TBili  0.2  /  DBili  x   /  AST  62<H>  /  ALT  113<H>  /  AlkPhos  74  02-24    LIVER FUNCTIONS - ( 24 Feb 2022 06:03 )  Alb: 4.0 g/dL / Pro: 6.7 g/dL / ALK PHOS: 74 U/L / ALT: 113 U/L / AST: 62 U/L / GGT: x           PT/INR - ( 24 Feb 2022 06:10 )   PT: 12.8 sec;   INR: 1.07 ratio         PTT - ( 24 Feb 2022 06:10 )  PTT:36.3 sec  ABG - ( 25 Feb 2022 14:50 )  pH, Arterial: 7.46  pH, Blood: x     /  pCO2: 37    /  pO2: 111   / HCO3: 26    / Base Excess: 2.5   /  SaO2: 99.0                ======================Micro/Rad/Cardio=================  CXR: Reviewed  Echo: Reviewed  Conclusions:  1. Normal mitral valve. Mild mitral regurgitation.  No  significant mitral stenosis.  2. Normal trileaflet aortic valve. Estimated aortic valve  area equals 2.3 sqcm. Minimal aortic regurgitation.  3. Ascending Aorta: 3.1 cm.  Atheroma Thickness: 2 mm (Grade 2).  4. Normal left ventricular systolic function. Mild  inferoseptal hypokinesis.  5. Mild diastolic dysfunction (Stage I).  6. Normal right atrium.  7. Right ventricular enlargement with normal right  ventricular systolic function.  8. Normal tricuspid valve. Minimal tricuspid regurgitation.  9. Normal pulmonic valve.  10. Color Doppler demonstrates no evidence of a patent  foramen ovale.  11. Normal pericardium with no pericardial effusion.    ======================================================  PAST MEDICAL & SURGICAL HISTORY:  Anxiety and depression    Hyperlipidemia    Hypertension    CAD (coronary artery disease)    History of appendectomy    S/P cholecystectomy    S/P inguinal hernia repair      ========================ASSESSMENT ================  CAD s/p C2L on 2/25   Hypovolemia   Hypertension   Hyperlipidemia   Post op respiratory insufficiency   Stress hyperglycemia   BPH    Jehovah Witness *     Plan:  ====================== NEUROLOGY=====================  Tylenol, Gabapentin, PRN Dilaudid, and PRN Oxycodone regimen to optimize function and sedated with IV Precedex for ventilatory synchrony.     ==================== RESPIRATORY======================  Respiratory status required full ventilatory support, close monitoring of respiratory rate and breathing pattern, the following of ABG’s with A-line monitoring, continuous pulse oximetry monitoring. Increased concern for atelectasis due to obesity related restrictive lung disease.     Mechanical Ventilation:  Mode: AC/ CMV (Assist Control/ Continuous Mandatory Ventilation)  RR (machine): 12  TV (machine): 650  FiO2: 50  PEEP: 5  ITime: 1  MAP: 11  PIP: 23      ====================CARDIOVASCULAR==================  Coronary artery disease s/p CABG with THAI 2/25. Intraop THAI: EF 50%, normal BiV function. Amiodarone for Afib prophylaxis.  Inotropic support with IV Levophed  infusion for systolic heart failure. Temporary back up pacing wires in place. Continue invasive hemodynamic monitoring.    ===================HEMATOLOGIC/ONC ===================  Monitor hemoglobin and hematocrit levels.     ===================== RENAL =========================  Hx of BPH continue with Flomax. Optimize renal perfusion with adequate volume resuscitation and continued monitoring of urine output, fluid balance, electrolytes, and BUN/Creatinine.     ==================== GASTROINTESTINAL===================  Tolerating an NPO diet, will advance as tolerated. Protonix for stress ulcer prophylaxis.     =======================    ENDOCRINE  =====================  Metabolic stability, stress hyperglycemia required an IV regular Insulin drip while following serial glucose levels to help achieve and maintain euglycemia.        ========================INFECTIOUS DISEASE================  Afebrile, white blood count within normal limits.   Continue trending white blood count and monitoring fever curve.  Perioperative coverage with Cefuroxime       Patient requires continuous monitoring with bedside rhythm monitoring, pulse oximetry monitoring, and continuous central venous and arterial pressure monitoring; and intermittent blood gas analysis.  Care plan discussed with ICU care team.    Patient remained critical, at risk for life threatening decompensation.   I have spent ___ minutes providing acute care with multiple re-evaluations throughout the evening.     By signing my name below, I, Marisela Chiu, attest that this documentation has been prepared under the direction and in the presence of Verna Dexter MD   Electronically signed: Angella Torres, 02-25-22 @ 18:13    I, Verna Dexter, personally performed the services described in this documentation. All medical record entries made by the scribe were at my direction and in my presence. I have reviewed the chart and agree that the record reflects my personal performance and is accurate and complete  Electronically signed: Verna Dexter MD 02-25-22 @ 18:13       MICHAEL RANDHAWA  MRN-[id number]  Patient is a 61y old  Male who presents with a chief complaint of chest pain, SOB (25 Feb 2022 14:22)    HPI:  Pt is 60 y/o [employer] M w/ PMH depression, HLD, HTN, CAD s/p stents (2015 Jan U, June [place], on Brilinta in past,  only now), inguinal hernia s/p repair, cholecystectomy (2012), and appendectomy (1970). Per pt on 1/22 began feeling L sided chest pressure and SOB, took an aspirin and felt better. Following day the pain returned and he went to [facility] for evaluation, where per pt serial troponins and EKG were done pt told they were negative and he was sent home. TTE was also done that day with mild hypokinesis of the inferior wall, LVEF 50-55%. His home cardiologist instructed him to go for a stress test, which he did on 2/2/22.   He then went for cardiac cath on 2/10, attempted catheterization of R wrist unsuccessful, cath proceeded in R groin. Pt was told that one of his stents was occluded and another was "down 50%".     Pt transferred to [facility] for CABG workup. He expressed interest in "bloodless" surgery due to his JW beliefs.  (11 Feb 2022 02:22)      Surgery/Hospital course:  2/25 C2L     Vital Signs Last 24 Hrs  T(C): 35.2 (25 Feb 2022 15:00), Max: 36.7 (24 Feb 2022 20:09)  T(F): 95.4 (25 Feb 2022 15:00), Max: 98 (24 Feb 2022 20:09)  HR: 79 (25 Feb 2022 17:45) (61 - 82)  BP: 113/71 (25 Feb 2022 07:00) (101/56 - 117/72)  BP(mean): --  RR: 15 (25 Feb 2022 17:45) (8 - 21)  SpO2: 99% (25 Feb 2022 17:45) (93% - 100%)  ============================I/O===========================  I&O's Summary    24 Feb 2022 07:01  -  25 Feb 2022 07:00  --------------------------------------------------------  IN: 890 mL / OUT: 1550 mL / NET: -660 mL    25 Feb 2022 07:01  -  25 Feb 2022 18:13  --------------------------------------------------------  IN: 1183.8 mL / OUT: 1070 mL / NET: 113.8 mL      ============================ LABS =========================                        15.8   6.18  )-----------( 262      ( 24 Feb 2022 06:10 )             47.9     02-24    137  |  102  |  19  ----------------------------<  104<H>  4.1   |  24  |  1.14    Ca    9.4      24 Feb 2022 06:03    TPro  6.7  /  Alb  4.0  /  TBili  0.2  /  DBili  x   /  AST  62<H>  /  ALT  113<H>  /  AlkPhos  74  02-24    LIVER FUNCTIONS - ( 24 Feb 2022 06:03 )  Alb: 4.0 g/dL / Pro: 6.7 g/dL / ALK PHOS: 74 U/L / ALT: 113 U/L / AST: 62 U/L / GGT: x           PT/INR - ( 24 Feb 2022 06:10 )   PT: 12.8 sec;   INR: 1.07 ratio         PTT - ( 24 Feb 2022 06:10 )  PTT:36.3 sec  ABG - ( 25 Feb 2022 14:50 )  pH, Arterial: 7.46  pH, Blood: x     /  pCO2: 37    /  pO2: 111   / HCO3: 26    / Base Excess: 2.5   /  SaO2: 99.0                ======================Micro/Rad/Cardio=================  CXR: Reviewed  Echo: Reviewed  Conclusions:  1. Normal mitral valve. Mild mitral regurgitation.  No  significant mitral stenosis.  2. Normal trileaflet aortic valve. Estimated aortic valve  area equals 2.3 sqcm. Minimal aortic regurgitation.  3. Ascending Aorta: 3.1 cm.  Atheroma Thickness: 2 mm (Grade 2).  4. Normal left ventricular systolic function. Mild  inferoseptal hypokinesis.  5. Mild diastolic dysfunction (Stage I).  6. Normal right atrium.  7. Right ventricular enlargement with normal right  ventricular systolic function.  8. Normal tricuspid valve. Minimal tricuspid regurgitation.  9. Normal pulmonic valve.  10. Color Doppler demonstrates no evidence of a patent  foramen ovale.  11. Normal pericardium with no pericardial effusion.    ======================================================  PAST MEDICAL & SURGICAL HISTORY:  Anxiety and depression    Hyperlipidemia    Hypertension    CAD (coronary artery disease)    History of appendectomy    S/P cholecystectomy    S/P inguinal hernia repair      ========================ASSESSMENT ================  CAD s/p C2L on 2/25   Hypovolemia   Hypertension   Hyperlipidemia   Post op respiratory insufficiency   Stress hyperglycemia   BPH    Jehovah Witness *     Plan:  ====================== NEUROLOGY=====================  Tylenol, Gabapentin, PRN Dilaudid, and PRN Oxycodone regimen to optimize function and sedated with IV Precedex for ventilatory synchrony.     ==================== RESPIRATORY======================  Respiratory status required full ventilatory support, close monitoring of respiratory rate and breathing pattern, the following of ABG’s with A-line monitoring, continuous pulse oximetry monitoring. Increased concern for atelectasis due to obesity related restrictive lung disease.     Mechanical Ventilation:  Mode: AC/ CMV (Assist Control/ Continuous Mandatory Ventilation)  RR (machine): 12  TV (machine): 650  FiO2: 50  PEEP: 5  ITime: 1  MAP: 11  PIP: 23      ====================CARDIOVASCULAR==================  Coronary artery disease s/p CABG with THAI 2/25. Intraop THAI: EF 50%, normal BiV function. Amiodarone for Afib prophylaxis. IV Levophed infusion for cardiogenic shock. Temporary back up pacing wires in place. Continue invasive hemodynamic monitoring.      ===================HEMATOLOGIC/ONC ===================  Monitor hemoglobin and hematocrit levels.     ===================== RENAL =========================  Hx of BPH continue with Flomax. Optimize renal perfusion with adequate volume resuscitation and continued monitoring of urine output, fluid balance, electrolytes, and BUN/Creatinine.     ==================== GASTROINTESTINAL===================  Tolerating an NPO diet, will advance as tolerated. Protonix for stress ulcer prophylaxis.     =======================    ENDOCRINE  =====================  Metabolic stability, stress hyperglycemia required an IV regular Insulin drip while following serial glucose levels to help achieve and maintain euglycemia.        ========================INFECTIOUS DISEASE================  Afebrile, white blood count within normal limits.   Continue trending white blood count and monitoring fever curve.  Perioperative coverage with Cefuroxime       Patient requires continuous monitoring with bedside rhythm monitoring, pulse oximetry monitoring, and continuous central venous and arterial pressure monitoring; and intermittent blood gas analysis.  Care plan discussed with ICU care team.    Patient remained critical, at risk for life threatening decompensation.   I have spent ___ minutes providing acute care with multiple re-evaluations throughout the evening.     By signing my name below, I, Marisela Chiu, attest that this documentation has been prepared under the direction and in the presence of Verna Dexter MD   Electronically signed: Angella Torres, 02-25-22 @ 18:13    I, Verna Dexter, personally performed the services described in this documentation. All medical record entries made by the scribe were at my direction and in my presence. I have reviewed the chart and agree that the record reflects my personal performance and is accurate and complete  Electronically signed: Verna Dexter MD 02-25-22 @ 18:13       MICHAEL RANDHAWA  MRN-[id number]  Patient is a 61y old  Male who presents with a chief complaint of chest pain, SOB (25 Feb 2022 14:22)    HPI:  Pt is 60 y/o [employer] M w/ PMH depression, HLD, HTN, CAD s/p stents (2015 Jan U, June [place], on Brilinta in past,  only now), inguinal hernia s/p repair, cholecystectomy (2012), and appendectomy (1970). Per pt on 1/22 began feeling L sided chest pressure and SOB, took an aspirin and felt better. Following day the pain returned and he went to [facility] for evaluation, where per pt serial troponins and EKG were done pt told they were negative and he was sent home. TTE was also done that day with mild hypokinesis of the inferior wall, LVEF 50-55%. His home cardiologist instructed him to go for a stress test, which he did on 2/2/22.   He then went for cardiac cath on 2/10, attempted catheterization of R wrist unsuccessful, cath proceeded in R groin. Pt was told that one of his stents was occluded and another was "down 50%".     Pt transferred to [facility] for CABG workup. He expressed interest in "bloodless" surgery due to his JW beliefs.  (11 Feb 2022 02:22)      Surgery/Hospital course:  2/25 C2L     Vital Signs Last 24 Hrs  T(C): 35.2 (25 Feb 2022 15:00), Max: 36.7 (24 Feb 2022 20:09)  T(F): 95.4 (25 Feb 2022 15:00), Max: 98 (24 Feb 2022 20:09)  HR: 79 (25 Feb 2022 17:45) (61 - 82)  BP: 113/71 (25 Feb 2022 07:00) (101/56 - 117/72)  BP(mean): --  RR: 15 (25 Feb 2022 17:45) (8 - 21)  SpO2: 99% (25 Feb 2022 17:45) (93% - 100%)  ============================I/O===========================  I&O's Summary    24 Feb 2022 07:01  -  25 Feb 2022 07:00  --------------------------------------------------------  IN: 890 mL / OUT: 1550 mL / NET: -660 mL    25 Feb 2022 07:01  -  25 Feb 2022 18:13  --------------------------------------------------------  IN: 1183.8 mL / OUT: 1070 mL / NET: 113.8 mL      ============================ LABS =========================                        15.8   6.18  )-----------( 262      ( 24 Feb 2022 06:10 )             47.9     02-24    137  |  102  |  19  ----------------------------<  104<H>  4.1   |  24  |  1.14    Ca    9.4      24 Feb 2022 06:03    TPro  6.7  /  Alb  4.0  /  TBili  0.2  /  DBili  x   /  AST  62<H>  /  ALT  113<H>  /  AlkPhos  74  02-24    LIVER FUNCTIONS - ( 24 Feb 2022 06:03 )  Alb: 4.0 g/dL / Pro: 6.7 g/dL / ALK PHOS: 74 U/L / ALT: 113 U/L / AST: 62 U/L / GGT: x           PT/INR - ( 24 Feb 2022 06:10 )   PT: 12.8 sec;   INR: 1.07 ratio    PTT - ( 24 Feb 2022 06:10 )  PTT:36.3 sec    ABG - ( 25 Feb 2022 14:50 )  pH, Arterial: 7.46  pH, Blood: x     /  pCO2: 37    /  pO2: 111   / HCO3: 26    / Base Excess: 2.5   /  SaO2: 99.0      ======================Micro/Rad/Cardio=================  CXR: Reviewed  Echo: Reviewed  Conclusions:  1. Normal mitral valve. Mild mitral regurgitation.  No  significant mitral stenosis.  2. Normal trileaflet aortic valve. Estimated aortic valve  area equals 2.3 sqcm. Minimal aortic regurgitation.  3. Ascending Aorta: 3.1 cm.  Atheroma Thickness: 2 mm (Grade 2).  4. Normal left ventricular systolic function. Mild  inferoseptal hypokinesis.  5. Mild diastolic dysfunction (Stage I).  6. Normal right atrium.  7. Right ventricular enlargement with normal right  ventricular systolic function.  8. Normal tricuspid valve. Minimal tricuspid regurgitation.  9. Normal pulmonic valve.  10. Color Doppler demonstrates no evidence of a patent  foramen ovale.  11. Normal pericardium with no pericardial effusion.    ======================================================  PAST MEDICAL & SURGICAL HISTORY:  Anxiety and depression    Hyperlipidemia    Hypertension    CAD (coronary artery disease)    History of appendectomy    S/P cholecystectomy    S/P inguinal hernia repair      ========================ASSESSMENT ================  CAD s/p C2L on 2/25   Hypovolemia   Hypertension   Hyperlipidemia   Post op respiratory insufficiency   Stress hyperglycemia   BPH    Jehovah Witness *     Plan:  ====================== NEUROLOGY=====================  Tylenol, Gabapentin, PRN Dilaudid, and PRN Oxycodone regimen to optimize function and sedated with IV Precedex for ventilatory synchrony.     ==================== RESPIRATORY======================  Respiratory status required full ventilatory support with subsequent weaning to pressure support and extubation, close monitoring of respiratory rate and breathing pattern, the following of ABG’s with A-line monitoring, continuous pulse oximetry monitoring. Patient has a significant amount of splinting and hypoventilation on initial attempts at extubation. Pain medication has been ordered while monitoring level of alertness and ability to breath. Cautious drawing of blood gases due to patient's status as a [employer].    ====================CARDIOVASCULAR==================  Patient admitted with multivessel coronary artery disease s/p CABG with THAI 2/25. Intraop THAI: EF 50%, normal BiV function. Patient has required cautious volume resuscitation and an IV Levophed infusion for vasogenic shock. Amiodarone for Afib prophylaxis.  Patient requires invasive monitoring with an arterial line and a central venous line for continuous arterial and central venous pressure in order to respond to and prevent hemodynamic decompensation.      ===================HEMATOLOGIC/ONC ===================  Patient is a [employer]. Limited blood drawing. Hematocrit 40 on blood gas post op.    ===================== RENAL =========================  Hx of BPH continue with Flomax. Optimize renal perfusion with adequate volume resuscitation and continued monitoring of urine output, fluid balance, electrolytes, and BUN/Creatinine.     ==================== GASTROINTESTINAL===================  Tolerating an NPO diet, will advance as tolerated. Protonix for stress ulcer prophylaxis.     =======================    ENDOCRINE  =====================  Metabolic stability, stress hyperglycemia required following serial glucose levels and as needed insulin to help achieve and maintain euglycemia.      ========================INFECTIOUS DISEASE================  Afebrile, white blood count within normal limits.   Continue trending white blood count and monitoring fever curve.  Perioperative coverage with Cefuroxime       Patient requires continuous monitoring with bedside rhythm monitoring, pulse oximetry monitoring, and continuous central venous and arterial pressure monitoring; and intermittent blood gas analysis.  Care plan discussed with ICU care team.    Patient remained critical, at risk for life threatening decompensation.   I have spent 30 minutes providing acute care with multiple re-evaluations throughout the evening.     By signing my name below, I, Marisela Chiu, attest that this documentation has been prepared under the direction and in the presence of Verna Dexter MD   Electronically signed: Angella Torres, 02-25-22 @ 18:13    I, Verna Dexter, personally performed the services described in this documentation. All medical record entries made by the scribe were at my direction and in my presence. I have reviewed the chart and agree that the record reflects my personal performance and is accurate and complete  Electronically signed: Verna Dexter MD 02-25-22 @ 18:13

## 2022-02-25 NOTE — PROGRESS NOTE ADULT - SUBJECTIVE AND OBJECTIVE BOX
MICHAEL RANDHAWA  MRN-[id number]  Patient is a 61y old  Male who presents with a chief complaint of chest pain, SOB (24 Feb 2022 14:18)    HPI:  Pt is 60 y/o Yazidi M w/ PMH depression, HLD, HTN, CAD s/p stents (2015 Jan SBU, June [place], on Brilinta in past,  only now), inguinal hernia s/p repair, cholecystectomy (2012), and appendectomy (1970). Per pt on 1/22 began feeling L sided chest pressure and SOB, took an aspirin and felt better. Following day the pain returned and he went to [facility] for evaluation, where per pt serial troponins and EKG were done pt told they were negative and he was sent home. TTE was also done that day with mild hypokinesis of the inferior wall, LVEF 50-55%. His home cardiologist instructed him to go for a stress test, which he did on 2/2/22.   He then went for cardiac cath on 2/10, attempted catheterization of R wrist unsuccessful, cath proceeded in R groin. Pt was told that one of his stents was occluded and another was "down 50%".     Pt transferred to [facility] for CABG workup. He expressed interest in "bloodless" surgery due to his JW beliefs.  (11 Feb 2022 02:22)      Surgery/Hospital Course:  2/25 C2L     Today:    REVIEW OF SYSTEMS:  Unable to obtain, pt intubated     ICU Vital Signs Last 24 Hrs  T(C): 35 (25 Feb 2022 13:25), Max: 36.7 (24 Feb 2022 20:09)  T(F): 95 (25 Feb 2022 13:25), Max: 98 (24 Feb 2022 20:09)  HR: 72 (25 Feb 2022 13:45) (65 - 82)  BP: 113/71 (25 Feb 2022 07:00) (101/56 - 117/72)  BP(mean): --  ABP: 101/61 (25 Feb 2022 13:45) (101/61 - 109/42)  ABP(mean): 76 (25 Feb 2022 13:45) (76 - 85)  RR: 12 (25 Feb 2022 13:45) (12 - 20)  SpO2: 98% (25 Feb 2022 13:45) (93% - 100%)      Physical Exam:  Gen:  intubated   CNS: sedated 	  Neck: no JVD  RES : clear , no wheezing              CVS: Regular  rhythm. Normal S1/S2  Chest: +chest tubes   Abd: Soft, non-distended. Bowel sounds present.  Skin: No rash.  Ext:  no edema    ============================I/O===========================   I&O's Detail    24 Feb 2022 07:01  -  25 Feb 2022 07:00  --------------------------------------------------------  IN:    Oral Fluid: 890 mL  Total IN: 890 mL    OUT:    Voided (mL): 1550 mL  Total OUT: 1550 mL    Total NET: -660 mL      25 Feb 2022 07:01  -  25 Feb 2022 14:22  --------------------------------------------------------  IN:  Total IN: 0 mL    OUT:    Chest Tube (mL): 55 mL    Chest Tube (mL): 35 mL    Indwelling Catheter - Urethral (mL): 230 mL  Total OUT: 320 mL    Total NET: -320 mL        ============================ LABS =========================                        15.8   6.18  )-----------( 262      ( 24 Feb 2022 06:10 )             47.9     02-24    137  |  102  |  19  ----------------------------<  104<H>  4.1   |  24  |  1.14    Ca    9.4      24 Feb 2022 06:03    TPro  6.7  /  Alb  4.0  /  TBili  0.2  /  DBili  x   /  AST  62<H>  /  ALT  113<H>  /  AlkPhos  74  02-24    LIVER FUNCTIONS - ( 24 Feb 2022 06:03 )  Alb: 4.0 g/dL / Pro: 6.7 g/dL / ALK PHOS: 74 U/L / ALT: 113 U/L / AST: 62 U/L / GGT: x           PT/INR - ( 24 Feb 2022 06:10 )   PT: 12.8 sec;   INR: 1.07 ratio         PTT - ( 24 Feb 2022 06:10 )  PTT:36.3 sec  ABG - ( 25 Feb 2022 13:37 )  pH, Arterial: 7.42  pH, Blood: x     /  pCO2: 42    /  pO2: 196   / HCO3: 27    / Base Excess: 2.4   /  SaO2: 99.7                ======================Micro/Rad/Cardio=================  CXR: Reviewed  Echo:Reviewed  ======================================================  PAST MEDICAL & SURGICAL HISTORY:  Anxiety and depression    Hyperlipidemia    Hypertension    CAD (coronary artery disease)    History of appendectomy    S/P cholecystectomy    S/P inguinal hernia repair      ====================ASSESSMENT ==============  CAD s/p C2L on 2/25   Hypovolemia   Post op respiratory insufficiency   Stress hyperglycemia     Jehovah Witness *     Plan:  ====================== NEUROLOGY=====================  Sedated with IV Precedex to maintain vent synchrony   Tylenol, Gabapentin, PRN Dilaudid, and PRN Oxycodone for analgesia     acetaminophen     Tablet .. 650 milliGRAM(s) Oral every 6 hours  dexMEDEtomidine Infusion 0.5 MICROgram(s)/kG/Hr (12.7 mL/Hr) IV Continuous <Continuous>  gabapentin 100 milliGRAM(s) Oral every 8 hours  HYDROmorphone  Injectable 0.5 milliGRAM(s) IV Push every 6 hours PRN Severe Pain (7 - 10)  oxyCODONE    IR 5 milliGRAM(s) Oral every 4 hours PRN Moderate Pain (4 - 6)  oxyCODONE    IR 10 milliGRAM(s) Oral every 4 hours PRN Severe Pain (7 - 10)    ==================== RESPIRATORY======================  Respiratory status required full ventilatory support, close monitoring of respiratory rate and breathing pattern, the following of ABG’s with A-line monitoring, continuous pulse oximetry monitoring     Mechanical Ventilation:  Mode: AC/ CMV (Assist Control/ Continuous Mandatory Ventilation)  RR (machine): 12  TV (machine): 650  FiO2: 70  PEEP: 5  ITime: 1  MAP: 9  PIP: 21      ====================CARDIOVASCULAR==================  CAD s/p C2L on 2/25   Intraop TAHI: EF 50%, normal BiV function   Continue invasive hemodynamic monitoring   Back up pacing wires in place, box off   Amiodarone for afib prophylaxis     aMIOdarone    Tablet 400 milliGRAM(s) Oral two times a day    ===================HEMATOLOGIC/ONC ===================  Monitor H&H/Plts      ===================== RENAL =========================  Continue monitoring urine output, I&OS, BUN/Cr   Replete lytes PRN. Keep K> 4 and Mg >2    ==================== GASTROINTESTINAL===================  NPO post operatively     ascorbic acid 500 milliGRAM(s) Oral two times a day  lactated ringers Bolus 750 milliLiter(s) IV Bolus once  GI prophylaxis, pantoprazole  Injectable 40 milliGRAM(s) IV Push daily  potassium chloride  10 mEq/50 mL IVPB 10 milliEquivalent(s) IV Intermittent every 1 hour  sodium chloride 0.9%. 1000 milliLiter(s) (10 mL/Hr) IV Continuous <Continuous>    =======================    ENDOCRINE  =====================  Stress hyperglycemia, continue glucose control with IV insulin drip     dextrose 50% Injectable 25 milliLiter(s) IV Push every 15 minutes  dextrose 50% Injectable 50 milliLiter(s) IV Push every 15 minutes  insulin regular Infusion 3 Unit(s)/Hr (3 mL/Hr) IV Continuous <Continuous>    ========================INFECTIOUS DISEASE================  Afebrile, WBC within normal limits  Continue trending WBC and monitoring fever curve   Perioperative coverage with Cefuroxime     cefuroxime  IVPB 1500 milliGRAM(s) IV Intermittent every 8 hours      Patient requires continuous monitoring with bedside rhythm monitoring, pulse ox monitoring, and intermittent blood gas analysis. Care plan discussed with ICU care team. Patient remained critical and at risk for life threatening decompensation.     By signing my name below, I, Jazz Montgomery, attest that this documentation has been prepared under the direction and in the presence of WINNIE Sue   Electronically signed: Rony Valentin, 02-25-22 @ 14:22    I, Vincenzo Casas, personally performed the services described in this documentation. all medical record entries made by the rony were at my direction and in my presence. I have reviewed the chart and agree that the record reflects my personal performance and is accurate and complete  Electronically signed: WINNIE Sue        MICHAEL RANDHAWA  MRN-[id number]  Patient is a 61y old  Male who presents with a chief complaint of chest pain, SOB (24 Feb 2022 14:18)    HPI:  Pt is 60 y/o Taoist M w/ PMH depression, HLD, HTN, CAD s/p stents (2015 Jan S[facility], June [place], on Brilinta in past,  only now), inguinal hernia s/p repair, cholecystectomy (2012), and appendectomy (1970). Per pt on 1/22 began feeling L sided chest pressure and SOB, took an aspirin and felt better. Following day the pain returned and he went to [facility] for evaluation, where per pt serial troponins and EKG were done pt told they were negative and he was sent home. TTE was also done that day with mild hypokinesis of the inferior wall, LVEF 50-55%. His home cardiologist instructed him to go for a stress test, which he did on 2/2/22.   He then went for cardiac cath on 2/10, attempted catheterization of R wrist unsuccessful, cath proceeded in R groin. Pt was told that one of his stents was occluded and another was "down 50%".     Pt transferred to [facility] for CABG workup. He expressed interest in "bloodless" surgery due to his JW beliefs.  (11 Feb 2022 02:22)    Surgery/Hospital Course:  2/25 C2L     Today: s/p C2L    REVIEW OF SYSTEMS:  Unable to obtain, pt intubated     ICU Vital Signs Last 24 Hrs  T(C): 35 (25 Feb 2022 13:25), Max: 36.7 (24 Feb 2022 20:09)  T(F): 95 (25 Feb 2022 13:25), Max: 98 (24 Feb 2022 20:09)  HR: 72 (25 Feb 2022 13:45) (65 - 82)  BP: 113/71 (25 Feb 2022 07:00) (101/56 - 117/72)  ABP: 101/61 (25 Feb 2022 13:45) (101/61 - 109/42)  ABP(mean): 76 (25 Feb 2022 13:45) (76 - 85)  RR: 12 (25 Feb 2022 13:45) (12 - 20)  SpO2: 98% (25 Feb 2022 13:45) (93% - 100%)    Physical Exam:  Gen:  intubated   CNS: sedated 	  Neck: no JVD  RES : clear , no wheezing              CVS: Regular  rhythm. Normal S1/S2  Chest: +chest tubes   Abd: Soft, non-distended. Bowel sounds present.  Skin: No rash.  Ext:  no edema    ============================I/O===========================   I&O's Detail    24 Feb 2022 07:01  -  25 Feb 2022 07:00  --------------------------------------------------------  IN:    Oral Fluid: 890 mL  Total IN: 890 mL    OUT:    Voided (mL): 1550 mL  Total OUT: 1550 mL    Total NET: -660 mL    25 Feb 2022 07:01  -  25 Feb 2022 14:22  --------------------------------------------------------  IN:  Total IN: 0 mL    OUT:    Chest Tube (mL): 55 mL    Chest Tube (mL): 35 mL    Indwelling Catheter - Urethral (mL): 230 mL  Total OUT: 320 mL    Total NET: -320 mL    ============================ LABS =========================                        15.8   6.18  )-----------( 262      ( 24 Feb 2022 06:10 )             47.9     137  |  102  |  19  ----------------------------<  104<H>  4.1   |  24  |  1.14    Ca    9.4      24 Feb 2022 06:03    TPro  6.7  /  Alb  4.0  /  TBili  0.2  /  DBili  x   /  AST  62<H>  /  ALT  113<H>  /  AlkPhos  74  02-24    LIVER FUNCTIONS - ( 24 Feb 2022 06:03 )  Alb: 4.0 g/dL / Pro: 6.7 g/dL / ALK PHOS: 74 U/L / ALT: 113 U/L / AST: 62 U/L / GGT: x           PT/INR - ( 24 Feb 2022 06:10 )   PT: 12.8 sec;   INR: 1.07 ratio       PTT - ( 24 Feb 2022 06:10 )  PTT:36.3 sec  ABG - ( 25 Feb 2022 13:37 )  pH, Arterial: 7.42  pH, Blood: x     /  pCO2: 42    /  pO2: 196   / HCO3: 27    / Base Excess: 2.4   /  SaO2: 99.7      ======================Micro/Rad/Cardio=================  CXR: Reviewed  Echo: Reviewed    ======================================================  PAST MEDICAL & SURGICAL HISTORY:  Anxiety and depression    Hyperlipidemia    Hypertension    CAD (coronary artery disease)    History of appendectomy    S/P cholecystectomy    S/P inguinal hernia repair    ====================ASSESSMENT ==============  CAD s/p C2L on 2/25   Hypovolemia   Post op respiratory insufficiency   Stress hyperglycemia     Jehovah Witness *     Plan:  ====================== NEUROLOGY=====================  Sedated with IV Precedex to maintain vent synchrony   Tylenol, Gabapentin, PRN Dilaudid, and PRN Oxycodone for analgesia     acetaminophen     Tablet .. 650 milliGRAM(s) Oral every 6 hours  dexMEDEtomidine Infusion 0.5 MICROgram(s)/kG/Hr (12.7 mL/Hr) IV Continuous <Continuous>  gabapentin 100 milliGRAM(s) Oral every 8 hours  HYDROmorphone  Injectable 0.5 milliGRAM(s) IV Push every 6 hours PRN Severe Pain (7 - 10)  oxyCODONE    IR 5 milliGRAM(s) Oral every 4 hours PRN Moderate Pain (4 - 6)  oxyCODONE    IR 10 milliGRAM(s) Oral every 4 hours PRN Severe Pain (7 - 10)    ==================== RESPIRATORY======================  Respiratory status required full ventilatory support, close monitoring of respiratory rate and breathing pattern, the following of ABG’s with A-line monitoring, continuous pulse oximetry monitoring     Mechanical Ventilation:  Mode: AC/ CMV (Assist Control/ Continuous Mandatory Ventilation)  RR (machine): 12  TV (machine): 650  FiO2: 70  PEEP: 5  ITime: 1  MAP: 9  PIP: 21    ====================CARDIOVASCULAR==================  CAD s/p C2L on 2/25   Intraop THAI: EF 50%, normal BiV function   Continue invasive hemodynamic monitoring   Back up pacing wires in place, box off   Amiodarone for afib prophylaxis     aMIOdarone    Tablet 400 milliGRAM(s) Oral two times a day    ===================HEMATOLOGIC/ONC ===================  Monitor H&H/Plts      ===================== RENAL =========================  Continue monitoring urine output, I&OS, BUN/Cr   Replete lytes PRN. Keep K> 4 and Mg >2    ==================== GASTROINTESTINAL===================  NPO post operatively     ascorbic acid 500 milliGRAM(s) Oral two times a day  lactated ringers Bolus 750 milliLiter(s) IV Bolus once  GI prophylaxis, pantoprazole  Injectable 40 milliGRAM(s) IV Push daily  potassium chloride  10 mEq/50 mL IVPB 10 milliEquivalent(s) IV Intermittent every 1 hour  sodium chloride 0.9%. 1000 milliLiter(s) (10 mL/Hr) IV Continuous <Continuous>    =======================    ENDOCRINE  =====================  Stress hyperglycemia, continue glucose control with IV insulin drip     dextrose 50% Injectable 25 milliLiter(s) IV Push every 15 minutes  dextrose 50% Injectable 50 milliLiter(s) IV Push every 15 minutes  insulin regular Infusion 3 Unit(s)/Hr (3 mL/Hr) IV Continuous <Continuous>    ========================INFECTIOUS DISEASE================  Afebrile, WBC within normal limits  Continue trending WBC and monitoring fever curve   Perioperative coverage with Cefuroxime     cefuroxime  IVPB 1500 milliGRAM(s) IV Intermittent every 8 hours    I have spent 50 minutes of noncontinuous critical care time with this patient.    Patient requires continuous monitoring with bedside rhythm monitoring, pulse ox monitoring, and intermittent blood gas analysis. Care plan discussed with ICU care team. Patient remained critical and at risk for life threatening decompensation.     By signing my name below, I, Jazz Montgomery, attest that this documentation has been prepared under the direction and in the presence of WINNIE Sue   Electronically signed: Rony Valentin, 02-25-22 @ 14:22    I, Vincenzo Casas, personally performed the services described in this documentation. all medical record entries made by the rony were at my direction and in my presence. I have reviewed the chart and agree that the record reflects my personal performance and is accurate and complete  Electronically signed: WINNIE Sue

## 2022-02-25 NOTE — PRE-ANESTHESIA EVALUATION ADULT - NSANTHBPHIGHRD_ENT_A_CORE
Patient is getting Xray now and requesting to use the bathroom before getting IV and blood work.   Yes

## 2022-02-25 NOTE — AIRWAY REMOVAL NOTE  ADULT & PEDS - ARTIFICAL AIRWAY REMOVAL COMMENTS
Written order for extubation verified. The patient was identified by full name and birth date compared to the identification band.  Present during the procedure was JOSE Serrato.

## 2022-02-25 NOTE — PRE-OP CHECKLIST - MUPIRONCIN COMMENTS
HIBICLENS SHOWER DONE @ 2 [facility] 0[facility] 2/24/22 at around 2000 and on 2/25/22 at HIBICLENS SHOWER DONE @ 2 [facility] 0[facility] 2/24/22 at around 2000 and on 2/25/22 at 0600

## 2022-02-26 LAB
ANION GAP SERPL CALC-SCNC: 12 MMOL/L — SIGNIFICANT CHANGE UP (ref 5–17)
BASOPHILS # BLD AUTO: 0.06 K/UL — SIGNIFICANT CHANGE UP (ref 0–0.2)
BASOPHILS NFR BLD AUTO: 0.5 % — SIGNIFICANT CHANGE UP (ref 0–2)
BUN SERPL-MCNC: 14 MG/DL — SIGNIFICANT CHANGE UP (ref 7–23)
CALCIUM SERPL-MCNC: 8.4 MG/DL — SIGNIFICANT CHANGE UP (ref 8.4–10.5)
CHLORIDE SERPL-SCNC: 105 MMOL/L — SIGNIFICANT CHANGE UP (ref 96–108)
CO2 SERPL-SCNC: 24 MMOL/L — SIGNIFICANT CHANGE UP (ref 22–31)
CREAT SERPL-MCNC: 1.1 MG/DL — SIGNIFICANT CHANGE UP (ref 0.5–1.3)
EOSINOPHIL # BLD AUTO: 0.08 K/UL — SIGNIFICANT CHANGE UP (ref 0–0.5)
EOSINOPHIL NFR BLD AUTO: 0.7 % — SIGNIFICANT CHANGE UP (ref 0–6)
GAS PNL BLDA: SIGNIFICANT CHANGE UP
GLUCOSE BLDC GLUCOMTR-MCNC: 100 MG/DL — HIGH (ref 70–99)
GLUCOSE BLDC GLUCOMTR-MCNC: 104 MG/DL — HIGH (ref 70–99)
GLUCOSE BLDC GLUCOMTR-MCNC: 106 MG/DL — HIGH (ref 70–99)
GLUCOSE BLDC GLUCOMTR-MCNC: 112 MG/DL — HIGH (ref 70–99)
GLUCOSE BLDC GLUCOMTR-MCNC: 115 MG/DL — HIGH (ref 70–99)
GLUCOSE BLDC GLUCOMTR-MCNC: 119 MG/DL — HIGH (ref 70–99)
GLUCOSE BLDC GLUCOMTR-MCNC: 127 MG/DL — HIGH (ref 70–99)
GLUCOSE BLDC GLUCOMTR-MCNC: 127 MG/DL — HIGH (ref 70–99)
GLUCOSE BLDC GLUCOMTR-MCNC: 129 MG/DL — HIGH (ref 70–99)
GLUCOSE BLDC GLUCOMTR-MCNC: 133 MG/DL — HIGH (ref 70–99)
GLUCOSE BLDC GLUCOMTR-MCNC: 134 MG/DL — HIGH (ref 70–99)
GLUCOSE BLDC GLUCOMTR-MCNC: 136 MG/DL — HIGH (ref 70–99)
GLUCOSE BLDC GLUCOMTR-MCNC: 137 MG/DL — HIGH (ref 70–99)
GLUCOSE BLDC GLUCOMTR-MCNC: 138 MG/DL — HIGH (ref 70–99)
GLUCOSE BLDC GLUCOMTR-MCNC: 139 MG/DL — HIGH (ref 70–99)
GLUCOSE BLDC GLUCOMTR-MCNC: 139 MG/DL — HIGH (ref 70–99)
GLUCOSE SERPL-MCNC: 166 MG/DL — HIGH (ref 70–99)
HCT VFR BLD CALC: 40.1 % — SIGNIFICANT CHANGE UP (ref 39–50)
HGB BLD-MCNC: 13.3 G/DL — SIGNIFICANT CHANGE UP (ref 13–17)
IMM GRANULOCYTES NFR BLD AUTO: 0.5 % — SIGNIFICANT CHANGE UP (ref 0–1.5)
LYMPHOCYTES # BLD AUTO: 0.72 K/UL — LOW (ref 1–3.3)
LYMPHOCYTES # BLD AUTO: 6.2 % — LOW (ref 13–44)
MAGNESIUM SERPL-MCNC: 2.4 MG/DL — SIGNIFICANT CHANGE UP (ref 1.6–2.6)
MCHC RBC-ENTMCNC: 30.2 PG — SIGNIFICANT CHANGE UP (ref 27–34)
MCHC RBC-ENTMCNC: 33.2 GM/DL — SIGNIFICANT CHANGE UP (ref 32–36)
MCV RBC AUTO: 91.1 FL — SIGNIFICANT CHANGE UP (ref 80–100)
MONOCYTES # BLD AUTO: 0.86 K/UL — SIGNIFICANT CHANGE UP (ref 0–0.9)
MONOCYTES NFR BLD AUTO: 7.4 % — SIGNIFICANT CHANGE UP (ref 2–14)
NEUTROPHILS # BLD AUTO: 9.91 K/UL — HIGH (ref 1.8–7.4)
NEUTROPHILS NFR BLD AUTO: 84.7 % — HIGH (ref 43–77)
NRBC # BLD: 0 /100 WBCS — SIGNIFICANT CHANGE UP (ref 0–0)
PHOSPHATE SERPL-MCNC: 3.6 MG/DL — SIGNIFICANT CHANGE UP (ref 2.5–4.5)
PLATELET # BLD AUTO: 209 K/UL — SIGNIFICANT CHANGE UP (ref 150–400)
POTASSIUM SERPL-MCNC: 4.6 MMOL/L — SIGNIFICANT CHANGE UP (ref 3.5–5.3)
POTASSIUM SERPL-SCNC: 4.6 MMOL/L — SIGNIFICANT CHANGE UP (ref 3.5–5.3)
RBC # BLD: 4.4 M/UL — SIGNIFICANT CHANGE UP (ref 4.2–5.8)
RBC # FLD: 15.2 % — HIGH (ref 10.3–14.5)
SODIUM SERPL-SCNC: 141 MMOL/L — SIGNIFICANT CHANGE UP (ref 135–145)
WBC # BLD: 11.69 K/UL — HIGH (ref 3.8–10.5)
WBC # FLD AUTO: 11.69 K/UL — HIGH (ref 3.8–10.5)

## 2022-02-26 PROCEDURE — 93010 ELECTROCARDIOGRAM REPORT: CPT

## 2022-02-26 PROCEDURE — 71045 X-RAY EXAM CHEST 1 VIEW: CPT | Mod: 26

## 2022-02-26 PROCEDURE — 99291 CRITICAL CARE FIRST HOUR: CPT | Mod: 24

## 2022-02-26 PROCEDURE — 71045 X-RAY EXAM CHEST 1 VIEW: CPT | Mod: 26,77

## 2022-02-26 RX ORDER — ALBUMIN HUMAN 25 %
250 VIAL (ML) INTRAVENOUS ONCE
Refills: 0 | Status: COMPLETED | OUTPATIENT
Start: 2022-02-26 | End: 2022-02-26

## 2022-02-26 RX ORDER — PANTOPRAZOLE SODIUM 20 MG/1
40 TABLET, DELAYED RELEASE ORAL
Refills: 0 | Status: DISCONTINUED | OUTPATIENT
Start: 2022-02-26 | End: 2022-03-02

## 2022-02-26 RX ORDER — ESCITALOPRAM OXALATE 10 MG/1
10 TABLET, FILM COATED ORAL DAILY
Refills: 0 | Status: DISCONTINUED | OUTPATIENT
Start: 2022-02-26 | End: 2022-03-02

## 2022-02-26 RX ORDER — HYDROMORPHONE HYDROCHLORIDE 2 MG/ML
0.5 INJECTION INTRAMUSCULAR; INTRAVENOUS; SUBCUTANEOUS EVERY 6 HOURS
Refills: 0 | Status: DISCONTINUED | OUTPATIENT
Start: 2022-02-26 | End: 2022-03-02

## 2022-02-26 RX ORDER — METOPROLOL TARTRATE 50 MG
25 TABLET ORAL EVERY 12 HOURS
Refills: 0 | Status: DISCONTINUED | OUTPATIENT
Start: 2022-02-26 | End: 2022-02-27

## 2022-02-26 RX ORDER — ACETAMINOPHEN 500 MG
1000 TABLET ORAL ONCE
Refills: 0 | Status: COMPLETED | OUTPATIENT
Start: 2022-02-26 | End: 2022-02-26

## 2022-02-26 RX ORDER — HYDROMORPHONE HYDROCHLORIDE 2 MG/ML
1 INJECTION INTRAMUSCULAR; INTRAVENOUS; SUBCUTANEOUS EVERY 6 HOURS
Refills: 0 | Status: DISCONTINUED | OUTPATIENT
Start: 2022-02-26 | End: 2022-02-26

## 2022-02-26 RX ORDER — ASPIRIN/CALCIUM CARB/MAGNESIUM 324 MG
81 TABLET ORAL DAILY
Refills: 0 | Status: DISCONTINUED | OUTPATIENT
Start: 2022-02-26 | End: 2022-03-02

## 2022-02-26 RX ORDER — BUPROPION HYDROCHLORIDE 150 MG/1
300 TABLET, EXTENDED RELEASE ORAL DAILY
Refills: 0 | Status: DISCONTINUED | OUTPATIENT
Start: 2022-02-26 | End: 2022-03-02

## 2022-02-26 RX ORDER — ENOXAPARIN SODIUM 100 MG/ML
40 INJECTION SUBCUTANEOUS DAILY
Refills: 0 | Status: DISCONTINUED | OUTPATIENT
Start: 2022-02-26 | End: 2022-03-02

## 2022-02-26 RX ORDER — METOCLOPRAMIDE HCL 10 MG
10 TABLET ORAL EVERY 8 HOURS
Refills: 0 | Status: COMPLETED | OUTPATIENT
Start: 2022-02-26 | End: 2022-02-27

## 2022-02-26 RX ORDER — ATORVASTATIN CALCIUM 80 MG/1
80 TABLET, FILM COATED ORAL DAILY
Refills: 0 | Status: DISCONTINUED | OUTPATIENT
Start: 2022-02-26 | End: 2022-03-02

## 2022-02-26 RX ORDER — SIMETHICONE 80 MG/1
80 TABLET, CHEWABLE ORAL EVERY 8 HOURS
Refills: 0 | Status: DISCONTINUED | OUTPATIENT
Start: 2022-02-26 | End: 2022-03-02

## 2022-02-26 RX ADMIN — Medication 81 MILLIGRAM(S): at 06:26

## 2022-02-26 RX ADMIN — HYDROMORPHONE HYDROCHLORIDE 0.5 MILLIGRAM(S): 2 INJECTION INTRAMUSCULAR; INTRAVENOUS; SUBCUTANEOUS at 22:30

## 2022-02-26 RX ADMIN — SENNA PLUS 2 TABLET(S): 8.6 TABLET ORAL at 22:00

## 2022-02-26 RX ADMIN — ENOXAPARIN SODIUM 40 MILLIGRAM(S): 100 INJECTION SUBCUTANEOUS at 13:32

## 2022-02-26 RX ADMIN — HYDROMORPHONE HYDROCHLORIDE 1 MILLIGRAM(S): 2 INJECTION INTRAMUSCULAR; INTRAVENOUS; SUBCUTANEOUS at 01:03

## 2022-02-26 RX ADMIN — Medication 500 MILLIGRAM(S): at 17:46

## 2022-02-26 RX ADMIN — Medication 400 MILLIGRAM(S): at 03:00

## 2022-02-26 RX ADMIN — Medication 100 MILLIGRAM(S): at 08:04

## 2022-02-26 RX ADMIN — Medication 100 MILLIGRAM(S): at 15:55

## 2022-02-26 RX ADMIN — HYDROMORPHONE HYDROCHLORIDE 1 MILLIGRAM(S): 2 INJECTION INTRAMUSCULAR; INTRAVENOUS; SUBCUTANEOUS at 14:30

## 2022-02-26 RX ADMIN — GABAPENTIN 100 MILLIGRAM(S): 400 CAPSULE ORAL at 13:33

## 2022-02-26 RX ADMIN — Medication 400 MILLIGRAM(S): at 10:01

## 2022-02-26 RX ADMIN — AMIODARONE HYDROCHLORIDE 400 MILLIGRAM(S): 400 TABLET ORAL at 17:45

## 2022-02-26 RX ADMIN — CHLORHEXIDINE GLUCONATE 1 APPLICATION(S): 213 SOLUTION TOPICAL at 11:36

## 2022-02-26 RX ADMIN — BUPROPION HYDROCHLORIDE 300 MILLIGRAM(S): 150 TABLET, EXTENDED RELEASE ORAL at 13:33

## 2022-02-26 RX ADMIN — HYDROMORPHONE HYDROCHLORIDE 1 MILLIGRAM(S): 2 INJECTION INTRAMUSCULAR; INTRAVENOUS; SUBCUTANEOUS at 01:30

## 2022-02-26 RX ADMIN — ESCITALOPRAM OXALATE 10 MILLIGRAM(S): 10 TABLET, FILM COATED ORAL at 13:33

## 2022-02-26 RX ADMIN — CHLORHEXIDINE GLUCONATE 5 MILLILITER(S): 213 SOLUTION TOPICAL at 05:39

## 2022-02-26 RX ADMIN — SIMETHICONE 80 MILLIGRAM(S): 80 TABLET, CHEWABLE ORAL at 18:05

## 2022-02-26 RX ADMIN — INSULIN HUMAN 3 UNIT(S)/HR: 100 INJECTION, SOLUTION SUBCUTANEOUS at 08:34

## 2022-02-26 RX ADMIN — ATORVASTATIN CALCIUM 80 MILLIGRAM(S): 80 TABLET, FILM COATED ORAL at 06:26

## 2022-02-26 RX ADMIN — Medication 500 MILLIGRAM(S): at 05:37

## 2022-02-26 RX ADMIN — HYDROMORPHONE HYDROCHLORIDE 1 MILLIGRAM(S): 2 INJECTION INTRAMUSCULAR; INTRAVENOUS; SUBCUTANEOUS at 08:00

## 2022-02-26 RX ADMIN — Medication 10 MILLIGRAM(S): at 11:00

## 2022-02-26 RX ADMIN — Medication 1 TABLET(S): at 13:33

## 2022-02-26 RX ADMIN — Medication 1000 MILLIGRAM(S): at 10:16

## 2022-02-26 RX ADMIN — HYDROMORPHONE HYDROCHLORIDE 0.5 MILLIGRAM(S): 2 INJECTION INTRAMUSCULAR; INTRAVENOUS; SUBCUTANEOUS at 22:04

## 2022-02-26 RX ADMIN — Medication 25 MILLIGRAM(S): at 18:05

## 2022-02-26 RX ADMIN — Medication 125 MILLILITER(S): at 08:16

## 2022-02-26 RX ADMIN — Medication 400 MILLIGRAM(S): at 20:00

## 2022-02-26 RX ADMIN — HYDROMORPHONE HYDROCHLORIDE 1 MILLIGRAM(S): 2 INJECTION INTRAMUSCULAR; INTRAVENOUS; SUBCUTANEOUS at 14:15

## 2022-02-26 RX ADMIN — Medication 10 MILLIGRAM(S): at 18:05

## 2022-02-26 RX ADMIN — Medication 1000 MILLIGRAM(S): at 03:30

## 2022-02-26 RX ADMIN — AMIODARONE HYDROCHLORIDE 400 MILLIGRAM(S): 400 TABLET ORAL at 05:37

## 2022-02-26 RX ADMIN — GABAPENTIN 100 MILLIGRAM(S): 400 CAPSULE ORAL at 22:00

## 2022-02-26 RX ADMIN — HYDROMORPHONE HYDROCHLORIDE 1 MILLIGRAM(S): 2 INJECTION INTRAMUSCULAR; INTRAVENOUS; SUBCUTANEOUS at 08:15

## 2022-02-26 RX ADMIN — GABAPENTIN 100 MILLIGRAM(S): 400 CAPSULE ORAL at 05:38

## 2022-02-26 RX ADMIN — Medication 3.82 MICROGRAM(S)/KG/MIN: at 08:34

## 2022-02-26 RX ADMIN — Medication 1000 MILLIGRAM(S): at 20:30

## 2022-02-26 NOTE — PHYSICAL THERAPY INITIAL EVALUATION ADULT - PRECAUTIONS/LIMITATIONS, REHAB EVAL
and he was sent home. TTE was also done that day with mild hypokinesis of the inferior wall, LVEF 50-55%. His home cardiologist instructed him to go for a stress test, which he did on 2/2/22. and he was sent home. TTE was also done that day with mild hypokinesis of the inferior wall, LVEF 50-55%. His home cardiologist instructed him to go for a stress test, which he did on 2/2/22./cardiac precautions/fall precautions/sternal precautions/surgical precautions

## 2022-02-26 NOTE — PHYSICAL THERAPY INITIAL EVALUATION ADULT - LEVEL OF CONSCIOUSNESS, REHAB EVAL
César Powers is a 73 year old male presenting to follow up on R shoulder pain and to review recent sleep study.   Medications verified, not taking tizanidine as the rx ran out. Denies known Latex allergy or symptoms of Latex sensitivity.  Health maintenance reviewed and patient is due for MWV and DM foot exam.   alert

## 2022-02-26 NOTE — PHYSICAL THERAPY INITIAL EVALUATION ADULT - GENERAL OBSERVATIONS, REHAB EVAL
pt received seated in chair, c/o L posterior rib pain, pre-medicated for pain +ICU monitoring, a-line, central line, chest tube x2, persaud catheter, 4L O2 NC, motivated to work with PT

## 2022-02-26 NOTE — PROGRESS NOTE ADULT - SUBJECTIVE AND OBJECTIVE BOX
MICHAEL RANDHAWA  MRN-[id number]  Patient is a 61y old  Male who presents with a chief complaint of chest pain, SOB (25 Feb 2022 18:12)    HPI:  Pt is 60 y/o Hindu M w/ PMH depression, HLD, HTN, CAD s/p stents (2015 Jan U, June [place], on Brilinta in past,  only now), inguinal hernia s/p repair, cholecystectomy (2012), and appendectomy (1970). Per pt on 1/22 began feeling L sided chest pressure and SOB, took an aspirin and felt better. Following day the pain returned and he went to [facility] for evaluation, where per pt serial troponins and EKG were done pt told they were negative and he was sent home. TTE was also done that day with mild hypokinesis of the inferior wall, LVEF 50-55%. His home cardiologist instructed him to go for a stress test, which he did on 2/2/22.   He then went for cardiac cath on 2/10, attempted catheterization of R wrist unsuccessful, cath proceeded in R groin. Pt was told that one of his stents was occluded and another was "down 50%".     Pt transferred to [facility] for CABG workup. He expressed interest in "bloodless" surgery due to his JW beliefs.  (11 Feb 2022 02:22)      Surgery/Hospital Course:  2/25 C2L     Today:  Extubated overnight     REVIEW OF SYSTEMS:  Gen: No fever  EYES/ENT: No visual changes;  No vertigo or throat pain   NECK: No pain   RES:  No shortness of breath or Cough  CARD: No chest pain   GI: No abdominal pain  : No dysuria  NEURO: No weakness  SKIN: No itching, rashes     ICU Vital Signs Last 24 Hrs  T(C): 37.6 (26 Feb 2022 08:00), Max: 37.6 (26 Feb 2022 08:00)  T(F): 99.7 (26 Feb 2022 08:00), Max: 99.7 (26 Feb 2022 08:00)  HR: 84 (26 Feb 2022 08:00) (61 - 98)  BP: --  BP(mean): --  ABP: 105/51 (26 Feb 2022 08:00) (70/37 - 145/84)  ABP(mean): 68 (26 Feb 2022 08:00) (50 - 107)  RR: 14 (26 Feb 2022 08:00) (5 - 30)  SpO2: 97% (26 Feb 2022 08:00) (91% - 100%)      Physical Exam:  Gen:  Awake, alert   CNS: non focal 	  Neck: no JVD  RES : clear , no wheezing     Chest: +Chest tubes            CVS: Regular  rhythm. Normal S1/S2  Abd: Soft, non-distended. Bowel sounds present.  Skin: No rash.  Ext:  no edema    ============================I/O===========================   I&O's Detail    25 Feb 2022 07:01  -  26 Feb 2022 07:00  --------------------------------------------------------  IN:    Dexmedetomidine: 63.8 mL    Insulin: 21 mL    IV PiggyBack: 100 mL    Lactated Ringers Bolus: 1000 mL    Norepinephrine: 146.8 mL    sodium chloride 0.9%: 180 mL  Total IN: 1511.6 mL    OUT:    Chest Tube (mL): 450 mL    Chest Tube (mL): 180 mL    Indwelling Catheter - Urethral (mL): 1670 mL  Total OUT: 2300 mL    Total NET: -788.4 mL      26 Feb 2022 07:01  -  26 Feb 2022 08:43  --------------------------------------------------------  IN:    Insulin: 3 mL    Norepinephrine: 5.7 mL    sodium chloride 0.9%: 10 mL  Total IN: 18.7 mL    OUT:    Chest Tube (mL): 20 mL    Chest Tube (mL): 20 mL    Indwelling Catheter - Urethral (mL): 40 mL  Total OUT: 80 mL    Total NET: -61.3 mL        ============================ LABS =========================                        13.3   11.69 )-----------( 209      ( 26 Feb 2022 00:23 )             40.1     02-26    141  |  105  |  14  ----------------------------<  166<H>  4.6   |  24  |  1.10    Ca    8.4      26 Feb 2022 00:23  Phos  3.6     02-26  Mg     2.4     02-26          ABG - ( 26 Feb 2022 00:12 )  pH, Arterial: 7.34  pH, Blood: x     /  pCO2: 53    /  pO2: 124   / HCO3: 29    / Base Excess: 1.8   /  SaO2: 99.0                ======================Micro/Rad/Cardio=================  CXR: Reviewed  Echo:Reviewed  ======================================================  PAST MEDICAL & SURGICAL HISTORY:  Anxiety and depression    Hyperlipidemia    Hypertension    CAD (coronary artery disease)    History of appendectomy    S/P cholecystectomy    S/P inguinal hernia repair      ====================ASSESSMENT ==============  CAD s/p C2L on 2/25   Hypovolemia   Post op respiratory insufficiency   Stress hyperglycemia   Jehovah Witness *   Stress hyperglycemia   BPH    Plan:  ====================== NEUROLOGY=====================  Nonfocal, continue to monitor neuro status per ICU protocol.   Tylenol, Oxycodone, Dilaudid, and gabapentin for analgesia   C/w bupropion, and escitalopram     acetaminophen     Tablet .. 650 milliGRAM(s) Oral every 6 hours  buPROPion XL (24-Hour) . 300 milliGRAM(s) Oral daily  escitalopram 10 milliGRAM(s) Oral daily  gabapentin 100 milliGRAM(s) Oral every 8 hours  HYDROmorphone  Injectable 1 milliGRAM(s) IV Push every 6 hours PRN Severe Pain (7 - 10)  oxyCODONE    IR 5 milliGRAM(s) Oral every 4 hours PRN Moderate Pain (4 - 6)  oxyCODONE    IR 10 milliGRAM(s) Oral every 4 hours PRN Severe Pain (7 - 10)  ==================== RESPIRATORY======================  Extubated overnight, Receiving supplemental O2 therapy with aerosol mask  Continue to monitor RR, breathing pattern, pulse ox, and ABG's.  Encourage incentive spirometry.   ====================CARDIOVASCULAR==================  CAD s/p C2L on 2/25   Intraop THAI: EF 50%, normal BiV function   Continue invasive hemodynamic monitoring   Back up pacing wires in place, box off   Amiodarone for afib prophylaxis   Blood pressure support w/ levophed   Continue with ASA and Lipitor for graft patency.     aMIOdarone    Tablet 400 milliGRAM(s) Oral two times a day  norepinephrine Infusion 0.02 MICROgram(s)/kG/Min (3.82 mL/Hr) IV Continuous <Continuous>  aspirin enteric coated 81 milliGRAM(s) Oral daily  atorvastatin 80 milliGRAM(s) Oral daily  ===================HEMATOLOGIC/ONC ===================  Monitor H&H/Plts      VTE prophylaxis, enoxaparin Injectable 40 milliGRAM(s) SubCutaneous daily  ===================== RENAL =========================  Continue to monitor I/Os, BUN/Creatinine, and urine output  Replete lytes PRN. Keep K> 4 and Mg >2   BPH, c/w flomax     tamsulosin 0.4 milliGRAM(s) Oral daily  ==================== GASTROINTESTINAL===================  Tolerating PO consistent carb diet.   Continue Protonix for stress ulcer prophylaxis.   Bowel regimen with Miralax and Senna     ascorbic acid 500 milliGRAM(s) Oral two times a day  multivitamin 1 Tablet(s) Oral daily  pantoprazole    Tablet 40 milliGRAM(s) Oral before breakfast  polyethylene glycol 3350 17 Gram(s) Oral daily  potassium chloride  10 mEq/50 mL IVPB 10 milliEquivalent(s) IV Intermittent every 1 hour  potassium chloride  10 mEq/50 mL IVPB 10 milliEquivalent(s) IV Intermittent every 1 hour  potassium chloride  10 mEq/50 mL IVPB 10 milliEquivalent(s) IV Intermittent every 1 hour  senna 2 Tablet(s) Oral at bedtime  sodium chloride 0.9%. 1000 milliLiter(s) (10 mL/Hr) IV Continuous <Continuous>  =======================    ENDOCRINE  =====================  Stress Hyperglycemia, requiring glucose control with insulin gtt, titrate as per insulin drip protocol along with hourly glucose checks.     dextrose 50% Injectable 50 milliLiter(s) IV Push every 15 minutes  dextrose 50% Injectable 25 milliLiter(s) IV Push every 15 minutes  insulin regular Infusion 3 Unit(s)/Hr (3 mL/Hr) IV Continuous <Continuous>  ========================INFECTIOUS DISEASE================  Afebrile, WBC within normal limits  Continue trending WBC and monitoring fever curve   Perioperative coverage with Cefuroxime     cefuroxime  IVPB 1500 milliGRAM(s) IV Intermittent every 8 hours      Patient requires continuous monitoring with bedside rhythm monitoring, pulse ox monitoring, and intermittent blood gas analysis. Care plan discussed with ICU care team. Patient remained critical and at risk for life threatening decompensation.     By signing my name below, Yuridia LUEVANO Tiffany, attest that this documentation has been prepared under the direction and in the presence of WINNIE Sue   Electronically signed: Vilma Shi Scribe, 02-26-22 @ 08:43    I, WINNIE Sue , personally performed the services described in this documentation. all medical record entries made by the rony were at my direction and in my presence. I have reviewed the chart and agree that the record reflects my personal performance and is accurate and complete  Electronically signed: WINNIE Sue        MICHAEL RANDHAWA  MRN-[id number]  Patient is a 61y old  Male who presents with a chief complaint of chest pain, SOB (25 Feb 2022 18:12)    HPI:  Pt is 60 y/o Buddhism M w/ PMH depression, HLD, HTN, CAD s/p stents (2015 Jan [facility], June [place], on Brilinta in past,  only now), inguinal hernia s/p repair, cholecystectomy (2012), and appendectomy (1970). Per pt on 1/22 began feeling L sided chest pressure and SOB, took an aspirin and felt better. Following day the pain returned and he went to [facility] for evaluation, where per pt serial troponins and EKG were done pt told they were negative and he was sent home. TTE was also done that day with mild hypokinesis of the inferior wall, LVEF 50-55%. His home cardiologist instructed him to go for a stress test, which he did on 2/2/22.   He then went for cardiac cath on 2/10, attempted catheterization of R wrist unsuccessful, cath proceeded in R groin. Pt was told that one of his stents was occluded and another was "down 50%".     Pt transferred to [facility] for CABG workup. He expressed interest in "bloodless" surgery due to his JW beliefs.  (11 Feb 2022 02:22)    Surgery/Hospital Course:  2/25 C2L     Today: Extubated overnight. Started on ASA, LVX and home medications. Plan to wean levophed and consider starting beta blocker later.     REVIEW OF SYSTEMS:  Gen: No fever  EYES/ENT: No visual changes;  No vertigo or throat pain   NECK: No pain   RES:  No shortness of breath or Cough  CARD: No chest pain   GI: No abdominal pain  : No dysuria  NEURO: No weakness  SKIN: No itching, rashes     ICU Vital Signs Last 24 Hrs  T(C): 37.6 (26 Feb 2022 08:00), Max: 37.6 (26 Feb 2022 08:00)  T(F): 99.7 (26 Feb 2022 08:00), Max: 99.7 (26 Feb 2022 08:00)  HR: 84 (26 Feb 2022 08:00) (61 - 98)  ABP: 105/51 (26 Feb 2022 08:00) (70/37 - 145/84)  ABP(mean): 68 (26 Feb 2022 08:00) (50 - 107)  RR: 14 (26 Feb 2022 08:00) (5 - 30)  SpO2: 97% (26 Feb 2022 08:00) (91% - 100%)    Physical Exam:  Gen:  Awake, alert   CNS: non focal 	  Neck: no JVD  RES : clear , no wheezing     Chest: +Chest tubes            CVS: Regular  rhythm. Normal S1/S2  Abd: Soft, non-distended. Bowel sounds present.  Skin: No rash.  Ext:  no edema    ============================I/O===========================   I&O's Detail    25 Feb 2022 07:01  -  26 Feb 2022 07:00  --------------------------------------------------------  IN:    Dexmedetomidine: 63.8 mL    Insulin: 21 mL    IV PiggyBack: 100 mL    Lactated Ringers Bolus: 1000 mL    Norepinephrine: 146.8 mL    sodium chloride 0.9%: 180 mL  Total IN: 1511.6 mL    OUT:    Chest Tube (mL): 450 mL    Chest Tube (mL): 180 mL    Indwelling Catheter - Urethral (mL): 1670 mL  Total OUT: 2300 mL    Total NET: -788.4 mL    26 Feb 2022 07:01  -  26 Feb 2022 08:43  --------------------------------------------------------  IN:    Insulin: 3 mL    Norepinephrine: 5.7 mL    sodium chloride 0.9%: 10 mL  Total IN: 18.7 mL    OUT:    Chest Tube (mL): 20 mL    Chest Tube (mL): 20 mL    Indwelling Catheter - Urethral (mL): 40 mL  Total OUT: 80 mL    Total NET: -61.3 mL    ============================ LABS =========================                        13.3   11.69 )-----------( 209      ( 26 Feb 2022 00:23 )             40.1     141  |  105  |  14  ----------------------------<  166<H>  4.6   |  24  |  1.10    Ca    8.4      26 Feb 2022 00:23  Phos  3.6     02-26  Mg     2.4     02-26    ABG - ( 26 Feb 2022 00:12 )  pH, Arterial: 7.34  pH, Blood: x     /  pCO2: 53    /  pO2: 124   / HCO3: 29    / Base Excess: 1.8   /  SaO2: 99.0      ======================Micro/Rad/Cardio=================  CXR: Reviewed  Echo: Reviewed    ======================================================  PAST MEDICAL & SURGICAL HISTORY:  Anxiety and depression    Hyperlipidemia    Hypertension    CAD (coronary artery disease)    History of appendectomy    S/P cholecystectomy    S/P inguinal hernia repair    ====================ASSESSMENT ==============  CAD s/p C2L on 2/25   Hypovolemia   Post op respiratory insufficiency   Stress hyperglycemia   Jehovah Witness *   Stress hyperglycemia   BPH    Plan:  ====================== NEUROLOGY=====================  Nonfocal, continue to monitor neuro status per ICU protocol.   Tylenol, Oxycodone, Dilaudid, and gabapentin for analgesia   Restarted on home meds: bupropion and escitalopram     acetaminophen     Tablet .. 650 milliGRAM(s) Oral every 6 hours  buPROPion XL (24-Hour) . 300 milliGRAM(s) Oral daily  escitalopram 10 milliGRAM(s) Oral daily  gabapentin 100 milliGRAM(s) Oral every 8 hours  HYDROmorphone  Injectable 1 milliGRAM(s) IV Push every 6 hours PRN Severe Pain (7 - 10)  oxyCODONE    IR 5 milliGRAM(s) Oral every 4 hours PRN Moderate Pain (4 - 6)  oxyCODONE    IR 10 milliGRAM(s) Oral every 4 hours PRN Severe Pain (7 - 10)    ==================== RESPIRATORY======================  Extubated overnight, Receiving supplemental O2 therapy with aerosol mask  Continue to monitor RR, breathing pattern, pulse ox, and ABG's.  Encourage incentive spirometry.     ====================CARDIOVASCULAR==================  CAD s/p C2L on 2/25   Intraop THAI: EF 50%, normal BiV function   Continue invasive hemodynamic monitoring   Back up pacing wires in place, box off   Amiodarone for afib prophylaxis   Blood pressure support w/ levophed   Continue with ASA and Lipitor for graft patency. Consider beta blocker when reliably off vasopressors.    aMIOdarone    Tablet 400 milliGRAM(s) Oral two times a day  norepinephrine Infusion 0.02 MICROgram(s)/kG/Min (3.82 mL/Hr) IV Continuous <Continuous>  aspirin enteric coated 81 milliGRAM(s) Oral daily  atorvastatin 80 milliGRAM(s) Oral daily    ===================HEMATOLOGIC/ONC ===================  Monitor H&H/Plts    Started on LVX for DVT ppx.    VTE prophylaxis, enoxaparin Injectable 40 milliGRAM(s) SubCutaneous daily    ===================== RENAL =========================  Continue to monitor I/Os, BUN/Creatinine, and urine output  Replete lytes PRN. Keep K> 4 and Mg >2   BPH, c/w flomax     tamsulosin 0.4 milliGRAM(s) Oral daily    ==================== GASTROINTESTINAL===================  Tolerating PO consistent carb diet.   Continue Protonix for stress ulcer prophylaxis.   Bowel regimen with Miralax and Senna     ascorbic acid 500 milliGRAM(s) Oral two times a day  multivitamin 1 Tablet(s) Oral daily  pantoprazole    Tablet 40 milliGRAM(s) Oral before breakfast  polyethylene glycol 3350 17 Gram(s) Oral daily  potassium chloride  10 mEq/50 mL IVPB 10 milliEquivalent(s) IV Intermittent every 1 hour  potassium chloride  10 mEq/50 mL IVPB 10 milliEquivalent(s) IV Intermittent every 1 hour  potassium chloride  10 mEq/50 mL IVPB 10 milliEquivalent(s) IV Intermittent every 1 hour  senna 2 Tablet(s) Oral at bedtime  sodium chloride 0.9%. 1000 milliLiter(s) (10 mL/Hr) IV Continuous <Continuous>    =======================    ENDOCRINE  =====================  Stress Hyperglycemia, requiring glucose control with insulin gtt, titrate as per insulin drip protocol along with hourly glucose checks.     dextrose 50% Injectable 50 milliLiter(s) IV Push every 15 minutes  dextrose 50% Injectable 25 milliLiter(s) IV Push every 15 minutes  insulin regular Infusion 3 Unit(s)/Hr (3 mL/Hr) IV Continuous <Continuous>    ========================INFECTIOUS DISEASE================  Afebrile, WBC within normal limits  Continue trending WBC and monitoring fever curve   Perioperative coverage with Cefuroxime     cefuroxime  IVPB 1500 milliGRAM(s) IV Intermittent every 8 hours    I have spent 35 minutes of noncontinuous critical care time with this patient.    Patient requires continuous monitoring with bedside rhythm monitoring, pulse ox monitoring, and intermittent blood gas analysis. Care plan discussed with ICU care team. Patient remained critical and at risk for life threatening decompensation.     By signing my name below, I, Vilma Shi, attest that this documentation has been prepared under the direction and in the presence of WINNIE Sue   Electronically signed: Vilma Shi Scribe, 02-26-22 @ 08:43    I, WINNIE Sue , personally performed the services described in this documentation. all medical record entries made by the rony were at my direction and in my presence. I have reviewed the chart and agree that the record reflects my personal performance and is accurate and complete  Electronically signed: WINNIE Sue

## 2022-02-26 NOTE — PHYSICAL THERAPY INITIAL EVALUATION ADULT - PERTINENT HX OF CURRENT PROBLEM, REHAB EVAL
60 y/o [employer] M w/ PMH depression, HLD, HTN, CAD s/p stents, inguinal hernia s/p repair, cholecystectomy, and appendectomy. Per pt on 1/22 began feeling L sided chest pressure and SOB, took an aspirin and felt better. Following day the pain returned and he went to [place] for evaluation, where per pt serial troponins and EKG were done pt told they were negative

## 2022-02-27 LAB
ALBUMIN SERPL ELPH-MCNC: 3.7 G/DL — SIGNIFICANT CHANGE UP (ref 3.3–5)
ALP SERPL-CCNC: 50 U/L — SIGNIFICANT CHANGE UP (ref 40–120)
ALT FLD-CCNC: 48 U/L — HIGH (ref 10–45)
ANION GAP SERPL CALC-SCNC: 11 MMOL/L — SIGNIFICANT CHANGE UP (ref 5–17)
AST SERPL-CCNC: 27 U/L — SIGNIFICANT CHANGE UP (ref 10–40)
BILIRUB SERPL-MCNC: 0.5 MG/DL — SIGNIFICANT CHANGE UP (ref 0.2–1.2)
BUN SERPL-MCNC: 13 MG/DL — SIGNIFICANT CHANGE UP (ref 7–23)
CALCIUM SERPL-MCNC: 8.1 MG/DL — LOW (ref 8.4–10.5)
CHLORIDE SERPL-SCNC: 106 MMOL/L — SIGNIFICANT CHANGE UP (ref 96–108)
CO2 SERPL-SCNC: 23 MMOL/L — SIGNIFICANT CHANGE UP (ref 22–31)
CREAT SERPL-MCNC: 0.86 MG/DL — SIGNIFICANT CHANGE UP (ref 0.5–1.3)
GAS PNL BLDA: SIGNIFICANT CHANGE UP
GLUCOSE BLDC GLUCOMTR-MCNC: 102 MG/DL — HIGH (ref 70–99)
GLUCOSE BLDC GLUCOMTR-MCNC: 105 MG/DL — HIGH (ref 70–99)
GLUCOSE BLDC GLUCOMTR-MCNC: 114 MG/DL — HIGH (ref 70–99)
GLUCOSE BLDC GLUCOMTR-MCNC: 114 MG/DL — HIGH (ref 70–99)
GLUCOSE BLDC GLUCOMTR-MCNC: 128 MG/DL — HIGH (ref 70–99)
GLUCOSE BLDC GLUCOMTR-MCNC: 130 MG/DL — HIGH (ref 70–99)
GLUCOSE BLDC GLUCOMTR-MCNC: 134 MG/DL — HIGH (ref 70–99)
GLUCOSE SERPL-MCNC: 125 MG/DL — HIGH (ref 70–99)
MAGNESIUM SERPL-MCNC: 2.2 MG/DL — SIGNIFICANT CHANGE UP (ref 1.6–2.6)
PHOSPHATE SERPL-MCNC: 2.5 MG/DL — SIGNIFICANT CHANGE UP (ref 2.5–4.5)
POTASSIUM SERPL-MCNC: 4.6 MMOL/L — SIGNIFICANT CHANGE UP (ref 3.5–5.3)
POTASSIUM SERPL-SCNC: 4.6 MMOL/L — SIGNIFICANT CHANGE UP (ref 3.5–5.3)
PROT SERPL-MCNC: 5.6 G/DL — LOW (ref 6–8.3)
SODIUM SERPL-SCNC: 140 MMOL/L — SIGNIFICANT CHANGE UP (ref 135–145)

## 2022-02-27 PROCEDURE — 99232 SBSQ HOSP IP/OBS MODERATE 35: CPT

## 2022-02-27 PROCEDURE — 71045 X-RAY EXAM CHEST 1 VIEW: CPT | Mod: 26

## 2022-02-27 PROCEDURE — 93010 ELECTROCARDIOGRAM REPORT: CPT

## 2022-02-27 RX ORDER — FUROSEMIDE 40 MG
20 TABLET ORAL DAILY
Refills: 0 | Status: DISCONTINUED | OUTPATIENT
Start: 2022-02-27 | End: 2022-03-02

## 2022-02-27 RX ORDER — ALBUMIN HUMAN 25 %
50 VIAL (ML) INTRAVENOUS
Refills: 0 | Status: COMPLETED | OUTPATIENT
Start: 2022-02-27 | End: 2022-02-27

## 2022-02-27 RX ORDER — ALBUMIN HUMAN 25 %
250 VIAL (ML) INTRAVENOUS ONCE
Refills: 0 | Status: COMPLETED | OUTPATIENT
Start: 2022-02-27 | End: 2022-02-27

## 2022-02-27 RX ORDER — FUROSEMIDE 40 MG
20 TABLET ORAL ONCE
Refills: 0 | Status: COMPLETED | OUTPATIENT
Start: 2022-02-27 | End: 2022-02-27

## 2022-02-27 RX ORDER — METOPROLOL TARTRATE 50 MG
12.5 TABLET ORAL EVERY 8 HOURS
Refills: 0 | Status: DISCONTINUED | OUTPATIENT
Start: 2022-02-27 | End: 2022-02-28

## 2022-02-27 RX ORDER — INSULIN LISPRO 100/ML
VIAL (ML) SUBCUTANEOUS
Refills: 0 | Status: DISCONTINUED | OUTPATIENT
Start: 2022-02-27 | End: 2022-03-02

## 2022-02-27 RX ADMIN — SENNA PLUS 2 TABLET(S): 8.6 TABLET ORAL at 21:21

## 2022-02-27 RX ADMIN — Medication 20 MILLIGRAM(S): at 13:07

## 2022-02-27 RX ADMIN — CHLORHEXIDINE GLUCONATE 1 APPLICATION(S): 213 SOLUTION TOPICAL at 12:00

## 2022-02-27 RX ADMIN — Medication 650 MILLIGRAM(S): at 05:56

## 2022-02-27 RX ADMIN — Medication 125 MILLILITER(S): at 02:29

## 2022-02-27 RX ADMIN — ESCITALOPRAM OXALATE 10 MILLIGRAM(S): 10 TABLET, FILM COATED ORAL at 13:07

## 2022-02-27 RX ADMIN — Medication 650 MILLIGRAM(S): at 13:37

## 2022-02-27 RX ADMIN — Medication 1 TABLET(S): at 13:08

## 2022-02-27 RX ADMIN — Medication 50 MILLILITER(S): at 06:30

## 2022-02-27 RX ADMIN — PANTOPRAZOLE SODIUM 40 MILLIGRAM(S): 20 TABLET, DELAYED RELEASE ORAL at 06:57

## 2022-02-27 RX ADMIN — TAMSULOSIN HYDROCHLORIDE 0.4 MILLIGRAM(S): 0.4 CAPSULE ORAL at 21:20

## 2022-02-27 RX ADMIN — GABAPENTIN 100 MILLIGRAM(S): 400 CAPSULE ORAL at 21:20

## 2022-02-27 RX ADMIN — GABAPENTIN 100 MILLIGRAM(S): 400 CAPSULE ORAL at 05:56

## 2022-02-27 RX ADMIN — Medication 12.5 MILLIGRAM(S): at 21:21

## 2022-02-27 RX ADMIN — Medication 650 MILLIGRAM(S): at 13:07

## 2022-02-27 RX ADMIN — GABAPENTIN 100 MILLIGRAM(S): 400 CAPSULE ORAL at 13:08

## 2022-02-27 RX ADMIN — Medication 62.5 MILLIMOLE(S): at 02:00

## 2022-02-27 RX ADMIN — ENOXAPARIN SODIUM 40 MILLIGRAM(S): 100 INJECTION SUBCUTANEOUS at 13:08

## 2022-02-27 RX ADMIN — Medication 81 MILLIGRAM(S): at 13:08

## 2022-02-27 RX ADMIN — Medication 50 MILLILITER(S): at 00:59

## 2022-02-27 RX ADMIN — Medication 650 MILLIGRAM(S): at 06:56

## 2022-02-27 RX ADMIN — Medication 50 MILLILITER(S): at 05:40

## 2022-02-27 RX ADMIN — Medication 50 MILLILITER(S): at 06:57

## 2022-02-27 RX ADMIN — Medication 20 MILLIGRAM(S): at 03:21

## 2022-02-27 RX ADMIN — Medication 12.5 MILLIGRAM(S): at 08:52

## 2022-02-27 RX ADMIN — HYDROMORPHONE HYDROCHLORIDE 0.5 MILLIGRAM(S): 2 INJECTION INTRAMUSCULAR; INTRAVENOUS; SUBCUTANEOUS at 10:50

## 2022-02-27 RX ADMIN — Medication 650 MILLIGRAM(S): at 18:00

## 2022-02-27 RX ADMIN — BUPROPION HYDROCHLORIDE 300 MILLIGRAM(S): 150 TABLET, EXTENDED RELEASE ORAL at 13:07

## 2022-02-27 RX ADMIN — Medication 650 MILLIGRAM(S): at 18:30

## 2022-02-27 RX ADMIN — ATORVASTATIN CALCIUM 80 MILLIGRAM(S): 80 TABLET, FILM COATED ORAL at 21:20

## 2022-02-27 RX ADMIN — AMIODARONE HYDROCHLORIDE 400 MILLIGRAM(S): 400 TABLET ORAL at 18:00

## 2022-02-27 RX ADMIN — Medication 500 MILLIGRAM(S): at 18:00

## 2022-02-27 RX ADMIN — Medication 100 MILLIGRAM(S): at 01:00

## 2022-02-27 RX ADMIN — Medication 500 MILLIGRAM(S): at 06:56

## 2022-02-27 RX ADMIN — HYDROMORPHONE HYDROCHLORIDE 0.5 MILLIGRAM(S): 2 INJECTION INTRAMUSCULAR; INTRAVENOUS; SUBCUTANEOUS at 11:05

## 2022-02-27 NOTE — PROGRESS NOTE ADULT - SUBJECTIVE AND OBJECTIVE BOX
MICHAEL ARNDHAWA  MRN-[id number]  Patient is a 61y old  Male who presents with a chief complaint of chest pain, SOB (27 Feb 2022 07:44)    HPI:  Pt is 60 y/o Buddhist M w/ PMH depression, HLD, HTN, CAD s/p stents (2015 Jan U, June [place], on Brilinta in past,  only now), inguinal hernia s/p repair, cholecystectomy (2012), and appendectomy (1970). Per pt on 1/22 began feeling L sided chest pressure and SOB, took an aspirin and felt better. Following day the pain returned and he went to [facility] for evaluation, where per pt serial troponins and EKG were done pt told they were negative and he was sent home. TTE was also done that day with mild hypokinesis of the inferior wall, LVEF 50-55%. His home cardiologist instructed him to go for a stress test, which he did on 2/2/22.   He then went for cardiac cath on 2/10, attempted catheterization of R wrist unsuccessful, cath proceeded in R groin. Pt was told that one of his stents was occluded and another was "down 50%".     Pt transferred to [facility] for CABG workup. He expressed interest in "bloodless" surgery due to his JW beliefs.  (11 Feb 2022 02:22)      Surgery/Hospital Course:  2/25 C2L     Today:  - continue with lopressor for rate control   - continue amio for afib prophylaxis    REVIEW OF SYSTEMS:  Gen: No fever  EYES/ENT: No visual changes;  No vertigo or throat pain   NECK: No pain   RES:  No shortness of breath or Cough  CARD: No chest pain   GI: No abdominal pain  : No dysuria  NEURO: No weakness  SKIN: No itching, rashes     ICU Vital Signs Last 24 Hrs  T(C): 37.1 (27 Feb 2022 08:00), Max: 38.3 (26 Feb 2022 20:00)  T(F): 98.8 (27 Feb 2022 08:00), Max: 100.9 (26 Feb 2022 20:00)  HR: 83 (27 Feb 2022 08:00) (67 - 88)  BP: --  BP(mean): --  ABP: 136/62 (27 Feb 2022 08:00) (94/46 - 140/61)  ABP(mean): 89 (27 Feb 2022 08:00) (62 - 89)  RR: 22 (27 Feb 2022 08:00) (1 - 26)  SpO2: 95% (27 Feb 2022 08:00) (93% - 100%)      Physical Exam:  Gen:  Awake, alert   CNS: non focal 	  Neck: no JVD  RES : clear , no wheezing     Chest: +Chest tubes            CVS: Regular  rhythm. Normal S1/S2  Abd: Soft, non-distended. Bowel sounds present.  Skin: No rash.  Ext:  no edema    ============================I/O===========================   I&O's Detail    26 Feb 2022 07:01  -  27 Feb 2022 07:00  --------------------------------------------------------  IN:    Albumin 5%  - 250 mL: 500 mL    Albumin 5% - 50 mL: 200 mL    Insulin: 9 mL    IV PiggyBack: 127.5 mL    IV PiggyBack: 300 mL    Norepinephrine: 11.4 mL    sodium chloride 0.9%: 240 mL  Total IN: 1387.9 mL    OUT:    Chest Tube (mL): 70 mL    Chest Tube (mL): 70 mL    Indwelling Catheter - Urethral (mL): 1655 mL  Total OUT: 1795 mL    Total NET: -407.1 mL      27 Feb 2022 07:01  -  27 Feb 2022 08:26  --------------------------------------------------------  IN:    sodium chloride 0.9%: 10 mL  Total IN: 10 mL    OUT:    Indwelling Catheter - Urethral (mL): 25 mL  Total OUT: 25 mL    Total NET: -15 mL        ============================ LABS =========================                        13.3   11.69 )-----------( 209      ( 26 Feb 2022 00:23 )             40.1     02-27    140  |  106  |  13  ----------------------------<  125<H>  4.6   |  23  |  0.86    Ca    8.1<L>      27 Feb 2022 00:42  Phos  2.5     02-27  Mg     2.2     02-27    TPro  5.6<L>  /  Alb  3.7  /  TBili  0.5  /  DBili  x   /  AST  27  /  ALT  48<H>  /  AlkPhos  50  02-27    LIVER FUNCTIONS - ( 27 Feb 2022 00:42 )  Alb: 3.7 g/dL / Pro: 5.6 g/dL / ALK PHOS: 50 U/L / ALT: 48 U/L / AST: 27 U/L / GGT: x             ABG - ( 27 Feb 2022 00:33 )  pH, Arterial: 7.57  pH, Blood: x     /  pCO2: 24    /  pO2: 90    / HCO3: 22    / Base Excess: 1.3   /  SaO2: 98.8                ======================Micro/Rad/Cardio=================  CXR: Reviewed  Echo:Reviewed  ======================================================  PAST MEDICAL & SURGICAL HISTORY:  Anxiety and depression    Hyperlipidemia    Hypertension    CAD (coronary artery disease)    History of appendectomy    S/P cholecystectomy    S/P inguinal hernia repair      ====================ASSESSMENT ==============  CAD s/p C2L on 2/25   Hypovolemia   Post op respiratory insufficiency   Stress hyperglycemia   Jehovah Witness *   Stress hyperglycemia   BPH      Plan:  ====================== NEUROLOGY=====================  Nonfocal, continue to monitor neuro status per ICU protocol.   Tylenol, Oxycodone, Dilaudid, and gabapentin for analgesia   Restarted on home meds: bupropion and escitalopram     acetaminophen     Tablet .. 650 milliGRAM(s) Oral every 6 hours  buPROPion XL (24-Hour) . 300 milliGRAM(s) Oral daily  escitalopram 10 milliGRAM(s) Oral daily  gabapentin 100 milliGRAM(s) Oral every 8 hours  HYDROmorphone  Injectable 0.5 milliGRAM(s) IV Push every 6 hours PRN Severe Pain (7 - 10)  oxyCODONE    IR 5 milliGRAM(s) Oral every 4 hours PRN Moderate Pain (4 - 6)  oxyCODONE    IR 10 milliGRAM(s) Oral every 4 hours PRN Severe Pain (7 - 10)    ==================== RESPIRATORY======================  Receiving supplemental O2 therapy with aerosol mask and 6L NC  Continue to monitor RR, breathing pattern, pulse ox, and ABG's.  Encourage incentive spirometry.     ====================CARDIOVASCULAR==================  CAD s/p C2L on 2/25   Intraop THAI: EF 50%, normal BiV function   Continue invasive hemodynamic monitoring   Back up pacing wires in place, box off   Amiodarone for afib prophylaxis   continue with lopressor for rate control   Continue with ASA and Lipitor for graft patency.    aMIOdarone    Tablet 400 milliGRAM(s) Oral two times a day  metoprolol tartrate 12.5 milliGRAM(s) Oral every 8 hours  atorvastatin 80 milliGRAM(s) Oral daily  aspirin enteric coated 81 milliGRAM(s) Oral daily    ===================HEMATOLOGIC/ONC ===================  Monitor H&H/Plts    Started on LVX for DVT ppx.    VTE prophylaxis, enoxaparin Injectable 40 milliGRAM(s) SubCutaneous daily    ===================== RENAL =========================  Continue to monitor I/Os, BUN/Creatinine, and urine output  Replete lytes PRN. Keep K> 4 and Mg >2   BPH, c/w flomax     tamsulosin 0.4 milliGRAM(s) Oral daily    ==================== GASTROINTESTINAL===================  Tolerating PO consistent carb diet.   Continue Protonix for stress ulcer prophylaxis.   Bowel regimen with Miralax and Senna   Simethicone PRN for gas     ascorbic acid 500 milliGRAM(s) Oral two times a day  bisacodyl Suppository 10 milliGRAM(s) Rectal once  multivitamin 1 Tablet(s) Oral daily  pantoprazole    Tablet 40 milliGRAM(s) Oral before breakfast  polyethylene glycol 3350 17 Gram(s) Oral daily  senna 2 Tablet(s) Oral at bedtime  simethicone 80 milliGRAM(s) Chew every 8 hours PRN Gas  sodium chloride 0.9%. 1000 milliLiter(s) (10 mL/Hr) IV Continuous <Continuous>    =======================    ENDOCRINE  =====================  Stress hyperglycemia, continue glucose control with admelog ss    dextrose 50% Injectable 50 milliLiter(s) IV Push every 15 minutes  insulin lispro (ADMELOG) corrective regimen sliding scale   SubCutaneous Before meals and at bedtime    ========================INFECTIOUS DISEASE================  Afebrile, WBC rising 6.18 -> 11.69  Continue trending WBC and monitoring fever curve     Patient requires continuous monitoring with bedside rhythm monitoring, pulse ox monitoring, and intermittent blood gas analysis. Care plan discussed with ICU care team. Patient remained critical and at risk for life threatening decompensation.     By signing my name below, I, Marisela Chiu, attest that this documentation has been prepared under the direction and in the presence of WINNIE Pierre.  Electronically signed: Angella Torres, 02-27-22 @ 08:26    I, Elida Landeros, personally performed the services described in this documentation. all medical record entries made by the scribe were at my direction and in my presence. I have reviewed the chart and agree that the record reflects my personal performance and is accurate and complete  Electronically signed: WINNIE Pierre.       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 Per pt on 1/22 began feeling L sided chest pressure and SOB, took an aspirin and felt better. Following day the pain returned and he went to [facility] for evaluation, where per pt serial troponins and EKG were done pt told they were negative and he was sent home. TTE was also done that day with mild hypokinesis of the inferior wall, LVEF 50-55%. His home cardiologist instructed him to go for a stress test, which he did on 2/2/22.   He then went for cardiac cath on 2/10, attempted catheterization of R wrist unsuccessful, cath proceeded in R groin. Pt was told that one of his stents was occluded and another was "down 50%".     Pt transferred to [facility] for CABG workup. He expressed interest in "bloodless" surgery due to his JW beliefs.  (11 Feb 2022 02:22)      Surgery/Hospital Course:  2/25 C2L     Today:  - continue with Lopressor for rate control   - continue Amio for AF prophylaxis  - given Albumin this AM, now 'smm Hg  - deintensify and transfer to floor     REVIEW OF SYSTEMS:  Gen: No fever  EYES/ENT: No visual changes;  No vertigo or throat pain   NECK: No pain   RES:  No shortness of breath or Cough  CARD: No chest pain   GI: No abdominal pain  : No dysuria  NEURO: No weakness  SKIN: No itching, rashes     ICU Vital Signs Last 24 Hrs  T(C): 37.1 (27 Feb 2022 08:00), Max: 38.3 (26 Feb 2022 20:00)  T(F): 98.8 (27 Feb 2022 08:00), Max: 100.9 (26 Feb 2022 20:00)  HR: 83 (27 Feb 2022 08:00) (67 - 88)  BP: --  BP(mean): --  ABP: 136/62 (27 Feb 2022 08:00) (94/46 - 140/61)  ABP(mean): 89 (27 Feb 2022 08:00) (62 - 89)  RR: 22 (27 Feb 2022 08:00) (1 - 26)  SpO2: 95% (27 Feb 2022 08:00) (93% - 100%)      Physical Exam:  Gen:  Awake, alert   CNS: non focal 	  Neck: no JVD  RES : clear , no wheezing     Chest: +Chest tubes            CVS: Regular  rhythm. Normal S1/S2  Abd: Soft, non-distended. Bowel sounds present.  Skin: No rash.  Ext:  no edema    ============================I/O===========================   I&O's Detail    26 Feb 2022 07:01  -  27 Feb 2022 07:00  --------------------------------------------------------  IN:    Albumin 5%  - 250 mL: 500 mL    Albumin 5% - 50 mL: 200 mL    Insulin: 9 mL    IV PiggyBack: 127.5 mL    IV PiggyBack: 300 mL    Norepinephrine: 11.4 mL    sodium chloride 0.9%: 240 mL  Total IN: 1387.9 mL    OUT:    Chest Tube (mL): 70 mL    Chest Tube (mL): 70 mL    Indwelling Catheter - Urethral (mL): 1655 mL  Total OUT: 1795 mL    Total NET: -407.1 mL      27 Feb 2022 07:01  -  27 Feb 2022 08:26  --------------------------------------------------------  IN:    sodium chloride 0.9%: 10 mL  Total IN: 10 mL    OUT:    Indwelling Catheter - Urethral (mL): 25 mL  Total OUT: 25 mL    Total NET: -15 mL        ============================ LABS =========================                        13.3   11.69 )-----------( 209      ( 26 Feb 2022 00:23 )             40.1     02-27    140  |  106  |  13  ----------------------------<  125<H>  4.6   |  23  |  0.86    Ca    8.1<L>      27 Feb 2022 00:42  Phos  2.5     02-27  Mg     2.2     02-27    TPro  5.6<L>  /  Alb  3.7  /  TBili  0.5  /  DBili  x   /  AST  27  /  ALT  48<H>  /  AlkPhos  50  02-27    LIVER FUNCTIONS - ( 27 Feb 2022 00:42 )  Alb: 3.7 g/dL / Pro: 5.6 g/dL / ALK PHOS: 50 U/L / ALT: 48 U/L / AST: 27 U/L / GGT: x             ABG - ( 27 Feb 2022 00:33 )  pH, Arterial: 7.57  pH, Blood: x     /  pCO2: 24    /  pO2: 90    / HCO3: 22    / Base Excess: 1.3   /  SaO2: 98.8                ======================Micro/Rad/Cardio=================  CXR: Reviewed  Echo:Reviewed  ======================================================  PAST MEDICAL & SURGICAL HISTORY:  Anxiety and depression    Hyperlipidemia    Hypertension    CAD (coronary artery disease)    History of appendectomy    S/P cholecystectomy    S/P inguinal hernia repair

## 2022-02-27 NOTE — PROGRESS NOTE ADULT - ASSESSMENT
====================ASSESSMENT ==============  CAD s/p C2L on 2/25   Hypovolemia   Post op respiratory insufficiency   Stress hyperglycemia   Jehovah Witness *   Stress hyperglycemia   BPH      Plan:  ====================== NEUROLOGY=====================  Nonfocal, continue to monitor neuro status per ICU protocol.   Tylenol, Oxycodone, Dilaudid, and Gabapentin for analgesia   Restarted on home meds: bupropion and escitalopram     acetaminophen     Tablet .. 650 milliGRAM(s) Oral every 6 hours  buPROPion XL (24-Hour) . 300 milliGRAM(s) Oral daily  escitalopram 10 milliGRAM(s) Oral daily  gabapentin 100 milliGRAM(s) Oral every 8 hours  HYDROmorphone  Injectable 0.5 milliGRAM(s) IV Push every 6 hours PRN Severe Pain (7 - 10)  oxyCODONE    IR 5 milliGRAM(s) Oral every 4 hours PRN Moderate Pain (4 - 6)  oxyCODONE    IR 10 milliGRAM(s) Oral every 4 hours PRN Severe Pain (7 - 10)    ==================== RESPIRATORY======================  Receiving supplemental O2 therapy with aerosol mask and 6L NC  Continue to monitor RR, breathing pattern, pulse ox, and ABG's.  Encourage incentive spirometry.     ====================CARDIOVASCULAR==================  CAD s/p C2L on 2/25   Intraop THAI: EF 50%, normal BiV function   Continue invasive hemodynamic monitoring   Back up pacing wires in place, box off   Amiodarone for AF prophylaxis   continue with lopressor for rate control, adjusted 12.5mg q8 from 25mg bid 2/2 slight hypotension this AM which resolved with albumin   Continue with ASA and Lipitor for graft patency.    aMIOdarone    Tablet 400 milliGRAM(s) Oral two times a day  metoprolol tartrate 12.5 milliGRAM(s) Oral every 8 hours  atorvastatin 80 milliGRAM(s) Oral daily  aspirin enteric coated 81 milliGRAM(s) Oral daily    ===================HEMATOLOGIC/ONC ===================  Monitor H&H/Plts    Started on LVX for DVT ppx.    VTE prophylaxis, enoxaparin Injectable 40 milliGRAM(s) SubCutaneous daily    ===================== RENAL =========================  Continue to monitor I/Os, BUN/Creatinine, and urine output  Replete lytes PRN. Keep K> 4 and Mg >2   BPH, c/w flomax   Urology following. Clear for TOV once OOB and ambulating frequently. As per Uro, if fails TOV, patient will need leg bag     tamsulosin 0.4 milliGRAM(s) Oral daily    ==================== GASTROINTESTINAL===================  Tolerating PO consistent carb diet.   Continue Protonix for stress ulcer prophylaxis.   Bowel regimen with Miralax and Senna   Simethicone PRN for gas     ascorbic acid 500 milliGRAM(s) Oral two times a day  bisacodyl Suppository 10 milliGRAM(s) Rectal once  multivitamin 1 Tablet(s) Oral daily  pantoprazole    Tablet 40 milliGRAM(s) Oral before breakfast  polyethylene glycol 3350 17 Gram(s) Oral daily  senna 2 Tablet(s) Oral at bedtime  simethicone 80 milliGRAM(s) Chew every 8 hours PRN Gas  sodium chloride 0.9%. 1000 milliLiter(s) (10 mL/Hr) IV Continuous <Continuous>    =======================    ENDOCRINE  =====================  Stress hyperglycemia, continue glucose control with admelog ss    dextrose 50% Injectable 50 milliLiter(s) IV Push every 15 minutes  insulin lispro (ADMELOG) corrective regimen sliding scale   SubCutaneous Before meals and at bedtime    ========================INFECTIOUS DISEASE================  Afebrile, WBC rising 6.18 -> 11.69  Continue trending WBC and monitoring fever curve     Patient requires continuous monitoring with bedside rhythm monitoring, pulse ox monitoring, and intermittent blood gas analysis. Care plan discussed with ICU care team. Patient remained critical and at risk for life threatening decompensation.     By signing my name below, I, Marisela Chiu, attest that this documentation has been prepared under the direction and in the presence of WINNIE Pierre.  Electronically signed: Angella Torres, 02-27-22 @ 08:26    I, Elida Landeros, personally performed the services described in this documentation. all medical record entries made by the scribe were at my direction and in my presence. I have reviewed the chart and agree that the record reflects my personal performance and is accurate and complete  Electronically signed: WINNIE Pierre.   ====================ASSESSMENT ==============  CAD s/p C2L on 2/25   Hypovolemia   Post op respiratory insufficiency   Stress hyperglycemia   Jehovah Witness *   Stress hyperglycemia   BPH      Plan:  ====================== NEUROLOGY=====================  Nonfocal, continue to monitor neuro status per ICU protocol.   Tylenol, Oxycodone, Dilaudid, and Gabapentin for analgesia   Restarted on home meds: bupropion and escitalopram     acetaminophen     Tablet .. 650 milliGRAM(s) Oral every 6 hours  buPROPion XL (24-Hour) . 300 milliGRAM(s) Oral daily  escitalopram 10 milliGRAM(s) Oral daily  gabapentin 100 milliGRAM(s) Oral every 8 hours  HYDROmorphone  Injectable 0.5 milliGRAM(s) IV Push every 6 hours PRN Severe Pain (7 - 10)  oxyCODONE    IR 5 milliGRAM(s) Oral every 4 hours PRN Moderate Pain (4 - 6)  oxyCODONE    IR 10 milliGRAM(s) Oral every 4 hours PRN Severe Pain (7 - 10)    ==================== RESPIRATORY======================  Receiving supplemental O2 therapy with aerosol mask and 6L NC  Continue to monitor RR, breathing pattern, pulse ox, and ABG's.  Encourage incentive spirometry.     ====================CARDIOVASCULAR==================  CAD s/p C2L on 2/25   Intraop THAI: EF 50%, normal BiV function   Continue invasive hemodynamic monitoring   Back up pacing wires in place, box off   Amiodarone for AF prophylaxis   continue with Lopressor for rate control, adjusted 12.5mg q8 from 25mg bid 2/2 slight hypotension this AM which resolved with albumin   Continue with ASA and Lipitor for graft patency.    aMIOdarone    Tablet 400 milliGRAM(s) Oral two times a day  metoprolol tartrate 12.5 milliGRAM(s) Oral every 8 hours  atorvastatin 80 milliGRAM(s) Oral daily  aspirin enteric coated 81 milliGRAM(s) Oral daily    ===================HEMATOLOGIC/ONC ===================  Monitor H&H/Plts    Started on LVX for DVT ppx.    VTE prophylaxis, enoxaparin Injectable 40 milliGRAM(s) SubCutaneous daily    ===================== RENAL =========================  Continue to monitor I/Os, BUN/Creatinine, and urine output  Replete lytes PRN. Keep K> 4 and Mg >2   BPH, c/w flomax   Urology following. Clear for TOV once OOB and ambulating frequently. As per Uro, if fails TOV, patient will need leg bag     tamsulosin 0.4 milliGRAM(s) Oral daily    ==================== GASTROINTESTINAL===================  Tolerating PO consistent carb diet.   Continue Protonix for stress ulcer prophylaxis.   Bowel regimen with Miralax and Senna   Simethicone PRN for gas     ascorbic acid 500 milliGRAM(s) Oral two times a day  bisacodyl Suppository 10 milliGRAM(s) Rectal once  multivitamin 1 Tablet(s) Oral daily  pantoprazole    Tablet 40 milliGRAM(s) Oral before breakfast  polyethylene glycol 3350 17 Gram(s) Oral daily  senna 2 Tablet(s) Oral at bedtime  simethicone 80 milliGRAM(s) Chew every 8 hours PRN Gas  sodium chloride 0.9%. 1000 milliLiter(s) (10 mL/Hr) IV Continuous <Continuous>    =======================    ENDOCRINE  =====================  Stress hyperglycemia, continue glucose control with admelog ss    dextrose 50% Injectable 50 milliLiter(s) IV Push every 15 minutes  insulin lispro (ADMELOG) corrective regimen sliding scale   SubCutaneous Before meals and at bedtime    ========================INFECTIOUS DISEASE================  Afebrile, WBC rising 6.18 -> 11.69  Continue trending WBC and monitoring fever curve   Will discontinue A line and IJ this morning    Patient requires continuous monitoring with bedside rhythm monitoring, pulse ox monitoring, and intermittent blood gas analysis. Care plan discussed with ICU care team. Patient remained critical and at risk for life threatening decompensation.     By signing my name below, I, Marisela Chiu, attest that this documentation has been prepared under the direction and in the presence of WINNIE Pierre.  Electronically signed: Angella Torres, 02-27-22 @ 08:26    I, Elida Landeros, personally performed the services described in this documentation. all medical record entries made by the scribe were at my direction and in my presence. I have reviewed the chart and agree that the record reflects my personal performance and is accurate and complete  Electronically signed: WINNIE Pierre.

## 2022-02-27 NOTE — PROGRESS NOTE ADULT - ASSESSMENT
patient with history of prostatitis, bph complicated by post op retention.  now s/p CABG  - continue flomax daily  - maintain persaud until oob and ambulating post op  - bowel regimen  - ok for TOV per usual CTS protocol. Monitor PVRs after. If fails home with persaud to leg bag

## 2022-02-27 NOTE — PROGRESS NOTE ADULT - SUBJECTIVE AND OBJECTIVE BOX
Urology Progress Note    Overnight Events:  No acute urologic events overnight. patient doing well s/p CABG. persaud in place    Review of Systems:   Constitutional: No weight loss, no weakness  CV: No chest pain, no chest pressure  Pulm: No shortness of breath, cough, or sputum    Physical Exam:  Vital signs  T(C): 37.7 (02-27-22 @ 04:00), Max: 38.3 (02-26-22 @ 20:00)  HR: 72 (02-27-22 @ 07:00)  BP: --  SpO2: 95% (02-27-22 @ 07:00)  Wt(kg): --    Gen: No acute distress. Normal mood sitting in chair  Abd: soft, non-tender, non-distended  : Non-palpable bladder persaud clear yellow    Labs:      02-27 @ 00:42    WBC --    / Hct --    / SCr 0.86     02-26 @ 00:23    WBC 11.69 / Hct 40.1  / SCr 1.10     02-27    140  |  106  |  13  ----------------------------<  125<H>  4.6   |  23  |  0.86    Ca    8.1<L>      27 Feb 2022 00:42  Phos  2.5     02-27  Mg     2.2     02-27    TPro  5.6<L>  /  Alb  3.7  /  TBili  0.5  /  DBili  x   /  AST  27  /  ALT  48<H>  /  AlkPhos  50  02-27

## 2022-02-28 LAB
ALBUMIN SERPL ELPH-MCNC: 4.1 G/DL — SIGNIFICANT CHANGE UP (ref 3.3–5)
ALP SERPL-CCNC: 48 U/L — SIGNIFICANT CHANGE UP (ref 40–120)
ALT FLD-CCNC: 33 U/L — SIGNIFICANT CHANGE UP (ref 10–45)
ANION GAP SERPL CALC-SCNC: 13 MMOL/L — SIGNIFICANT CHANGE UP (ref 5–17)
AST SERPL-CCNC: 23 U/L — SIGNIFICANT CHANGE UP (ref 10–40)
BILIRUB SERPL-MCNC: 0.6 MG/DL — SIGNIFICANT CHANGE UP (ref 0.2–1.2)
BUN SERPL-MCNC: 15 MG/DL — SIGNIFICANT CHANGE UP (ref 7–23)
CALCIUM SERPL-MCNC: 8.8 MG/DL — SIGNIFICANT CHANGE UP (ref 8.4–10.5)
CHLORIDE SERPL-SCNC: 103 MMOL/L — SIGNIFICANT CHANGE UP (ref 96–108)
CO2 SERPL-SCNC: 25 MMOL/L — SIGNIFICANT CHANGE UP (ref 22–31)
CREAT SERPL-MCNC: 0.93 MG/DL — SIGNIFICANT CHANGE UP (ref 0.5–1.3)
GLUCOSE BLDC GLUCOMTR-MCNC: 102 MG/DL — HIGH (ref 70–99)
GLUCOSE BLDC GLUCOMTR-MCNC: 109 MG/DL — HIGH (ref 70–99)
GLUCOSE BLDC GLUCOMTR-MCNC: 112 MG/DL — HIGH (ref 70–99)
GLUCOSE BLDC GLUCOMTR-MCNC: 117 MG/DL — HIGH (ref 70–99)
GLUCOSE SERPL-MCNC: 108 MG/DL — HIGH (ref 70–99)
HCT VFR BLD CALC: 36.1 % — LOW (ref 39–50)
HGB BLD-MCNC: 11.8 G/DL — LOW (ref 13–17)
MAGNESIUM SERPL-MCNC: 2.1 MG/DL — SIGNIFICANT CHANGE UP (ref 1.6–2.6)
MCHC RBC-ENTMCNC: 30.6 PG — SIGNIFICANT CHANGE UP (ref 27–34)
MCHC RBC-ENTMCNC: 32.7 GM/DL — SIGNIFICANT CHANGE UP (ref 32–36)
MCV RBC AUTO: 93.8 FL — SIGNIFICANT CHANGE UP (ref 80–100)
NRBC # BLD: 0 /100 WBCS — SIGNIFICANT CHANGE UP (ref 0–0)
PHOSPHATE SERPL-MCNC: 1.5 MG/DL — LOW (ref 2.5–4.5)
PLATELET # BLD AUTO: 159 K/UL — SIGNIFICANT CHANGE UP (ref 150–400)
POTASSIUM SERPL-MCNC: 4 MMOL/L — SIGNIFICANT CHANGE UP (ref 3.5–5.3)
POTASSIUM SERPL-SCNC: 4 MMOL/L — SIGNIFICANT CHANGE UP (ref 3.5–5.3)
PROT SERPL-MCNC: 6.3 G/DL — SIGNIFICANT CHANGE UP (ref 6–8.3)
RBC # BLD: 3.85 M/UL — LOW (ref 4.2–5.8)
RBC # FLD: 15 % — HIGH (ref 10.3–14.5)
SODIUM SERPL-SCNC: 141 MMOL/L — SIGNIFICANT CHANGE UP (ref 135–145)
WBC # BLD: 7.8 K/UL — SIGNIFICANT CHANGE UP (ref 3.8–10.5)
WBC # FLD AUTO: 7.8 K/UL — SIGNIFICANT CHANGE UP (ref 3.8–10.5)

## 2022-02-28 PROCEDURE — 71045 X-RAY EXAM CHEST 1 VIEW: CPT | Mod: 26

## 2022-02-28 RX ORDER — METOPROLOL TARTRATE 50 MG
25 TABLET ORAL EVERY 12 HOURS
Refills: 0 | Status: DISCONTINUED | OUTPATIENT
Start: 2022-02-28 | End: 2022-03-01

## 2022-02-28 RX ADMIN — GABAPENTIN 100 MILLIGRAM(S): 400 CAPSULE ORAL at 05:25

## 2022-02-28 RX ADMIN — Medication 500 MILLIGRAM(S): at 17:21

## 2022-02-28 RX ADMIN — GABAPENTIN 100 MILLIGRAM(S): 400 CAPSULE ORAL at 21:06

## 2022-02-28 RX ADMIN — Medication 500 MILLIGRAM(S): at 05:25

## 2022-02-28 RX ADMIN — AMIODARONE HYDROCHLORIDE 400 MILLIGRAM(S): 400 TABLET ORAL at 05:24

## 2022-02-28 RX ADMIN — Medication 650 MILLIGRAM(S): at 05:25

## 2022-02-28 RX ADMIN — Medication 650 MILLIGRAM(S): at 12:29

## 2022-02-28 RX ADMIN — Medication 650 MILLIGRAM(S): at 01:00

## 2022-02-28 RX ADMIN — Medication 1 TABLET(S): at 12:31

## 2022-02-28 RX ADMIN — Medication 62.5 MILLIMOLE(S): at 03:00

## 2022-02-28 RX ADMIN — Medication 81 MILLIGRAM(S): at 12:30

## 2022-02-28 RX ADMIN — TAMSULOSIN HYDROCHLORIDE 0.4 MILLIGRAM(S): 0.4 CAPSULE ORAL at 12:30

## 2022-02-28 RX ADMIN — ESCITALOPRAM OXALATE 10 MILLIGRAM(S): 10 TABLET, FILM COATED ORAL at 21:06

## 2022-02-28 RX ADMIN — SENNA PLUS 2 TABLET(S): 8.6 TABLET ORAL at 21:06

## 2022-02-28 RX ADMIN — GABAPENTIN 100 MILLIGRAM(S): 400 CAPSULE ORAL at 14:06

## 2022-02-28 RX ADMIN — ENOXAPARIN SODIUM 40 MILLIGRAM(S): 100 INJECTION SUBCUTANEOUS at 12:30

## 2022-02-28 RX ADMIN — BUPROPION HYDROCHLORIDE 300 MILLIGRAM(S): 150 TABLET, EXTENDED RELEASE ORAL at 12:31

## 2022-02-28 RX ADMIN — Medication 25 MILLIGRAM(S): at 05:24

## 2022-02-28 RX ADMIN — Medication 650 MILLIGRAM(S): at 05:55

## 2022-02-28 RX ADMIN — Medication 650 MILLIGRAM(S): at 00:55

## 2022-02-28 RX ADMIN — Medication 20 MILLIGRAM(S): at 05:25

## 2022-02-28 RX ADMIN — Medication 25 MILLIGRAM(S): at 17:21

## 2022-02-28 RX ADMIN — PANTOPRAZOLE SODIUM 40 MILLIGRAM(S): 20 TABLET, DELAYED RELEASE ORAL at 08:33

## 2022-02-28 RX ADMIN — ATORVASTATIN CALCIUM 80 MILLIGRAM(S): 80 TABLET, FILM COATED ORAL at 12:31

## 2022-02-28 NOTE — PROGRESS NOTE ADULT - ASSESSMENT
61M s/p CABG with hx post op retention  - continue flomax daily  - maintain persaud until oob and ambulating post op  - bowel regimen  - ok for TOV per usual CTS protocol. Monitor PVRs after. If fails home with persaud to leg bag  - outpatient  follow up

## 2022-02-28 NOTE — PROGRESS NOTE ADULT - SUBJECTIVE AND OBJECTIVE BOX
Urology Progress Note    Overnight Events:  No acute urologic events overnight. out of bed to chair    Review of Systems:   Constitutional: No weight loss, no weakness  CV: No chest pain, no chest pressure  Pulm: No shortness of breath, cough, or sputum    Physical Exam:  Vital signs  T(C): 37.7 (02-27-22 @ 04:00), Max: 38.3 (02-26-22 @ 20:00)  HR: 72 (02-27-22 @ 07:00)  BP: --  SpO2: 95% (02-27-22 @ 07:00)  Wt(kg): --    Gen: No acute distress. Normal mood sitting in chair  Abd: soft, non-tender, non-distended  : Non-palpable bladder persaud clear yellow    Labs:      02-27 @ 00:42    WBC --    / Hct --    / SCr 0.86     02-26 @ 00:23    WBC 11.69 / Hct 40.1  / SCr 1.10     02-27    140  |  106  |  13  ----------------------------<  125<H>  4.6   |  23  |  0.86    Ca    8.1<L>      27 Feb 2022 00:42  Phos  2.5     02-27  Mg     2.2     02-27    TPro  5.6<L>  /  Alb  3.7  /  TBili  0.5  /  DBili  x   /  AST  27  /  ALT  48<H>  /  AlkPhos  50  02-27

## 2022-02-28 NOTE — PROGRESS NOTE ADULT - SUBJECTIVE AND OBJECTIVE BOX
MICHAEL RANDHAWA  MRN-[id number]  Patient is a 61y old  Male who presents with a chief complaint of chest pain, SOB (27 Feb 2022 08:25)    HPI:  Pt is 62 y/o Oriental orthodox M w/ PMH depression, HLD, HTN, CAD s/p stents (2015 Jan U, June [place], on Brilinta in past,  only now), inguinal hernia s/p repair, cholecystectomy (2012), and appendectomy (1970). Per pt on 1/22 began feeling L sided chest pressure and SOB, took an aspirin and felt better. Following day the pain returned and he went to [facility] for evaluation, where per pt serial troponins and EKG were done pt told they were negative and he was sent home. TTE was also done that day with mild hypokinesis of the inferior wall, LVEF 50-55%. His home cardiologist instructed him to go for a stress test, which he did on 2/2/22.   He then went for cardiac cath on 2/10, attempted catheterization of R wrist unsuccessful, cath proceeded in R groin. Pt was told that one of his stents was occluded and another was "down 50%".     Pt transferred to [facility] for CABG workup. He expressed interest in "bloodless" surgery due to his JW beliefs.  (11 Feb 2022 02:22)      Surgery/Hospital Course:  2/25 C2L     Today:    REVIEW OF SYSTEMS:  Gen: No fever  EYES/ENT: No visual changes;  No vertigo or throat pain   NECK: No pain   RES:  No shortness of breath or Cough  CARD: No chest pain   GI: No abdominal pain  : No dysuria  NEURO: No weakness  SKIN: No itching, rashes     ICU Vital Signs Last 24 Hrs  T(C): 37 (28 Feb 2022 04:00), Max: 37.1 (27 Feb 2022 08:00)  T(F): 98.6 (28 Feb 2022 04:00), Max: 98.8 (27 Feb 2022 08:00)  HR: 79 (28 Feb 2022 07:00) (72 - 98)  BP: 113/62 (28 Feb 2022 07:00) (100/61 - 129/75)  BP(mean): 79 (28 Feb 2022 07:00) (74 - 96)  ABP: 117/55 (27 Feb 2022 09:00) (117/55 - 136/62)  ABP(mean): 76 (27 Feb 2022 09:00) (76 - 89)  RR: 18 (28 Feb 2022 01:00) (10 - 24)  SpO2: 98% (28 Feb 2022 07:00) (93% - 99%)      Physical Exam:  Gen:  Awake, alert   CNS: non focal 	  Neck: no JVD  RES : clear , no wheezing              CVS: Regular  rhythm. Normal S1/S2  Abd: Soft, non-distended. Bowel sounds present.  Skin: No rash.  Ext:  no edema    ============================I/O===========================   I&O's Detail    27 Feb 2022 07:01  -  28 Feb 2022 07:00  --------------------------------------------------------  IN:    IV PiggyBack: 250 mL    sodium chloride 0.9%: 40 mL  Total IN: 290 mL    OUT:    Indwelling Catheter - Urethral (mL): 1140 mL  Total OUT: 1140 mL    Total NET: -850 mL        ============================ LABS =========================                        11.8   7.80  )-----------( 159      ( 28 Feb 2022 00:47 )             36.1     02-28    141  |  103  |  15  ----------------------------<  108<H>  4.0   |  25  |  0.93    Ca    8.8      28 Feb 2022 00:47  Phos  1.5     02-28  Mg     2.1     02-28    TPro  6.3  /  Alb  4.1  /  TBili  0.6  /  DBili  x   /  AST  23  /  ALT  33  /  AlkPhos  48  02-28    LIVER FUNCTIONS - ( 28 Feb 2022 00:47 )  Alb: 4.1 g/dL / Pro: 6.3 g/dL / ALK PHOS: 48 U/L / ALT: 33 U/L / AST: 23 U/L / GGT: x             ABG - ( 27 Feb 2022 00:33 )  pH, Arterial: 7.57  pH, Blood: x     /  pCO2: 24    /  pO2: 90    / HCO3: 22    / Base Excess: 1.3   /  SaO2: 98.8                ======================Micro/Rad/Cardio=================  CXR: Reviewed  Echo:Reviewed  ======================================================  PAST MEDICAL & SURGICAL HISTORY:  Anxiety and depression    Hyperlipidemia    Hypertension    CAD (coronary artery disease)    History of appendectomy    S/P cholecystectomy    S/P inguinal hernia repair      ====================ASSESSMENT ==============  CAD s/p C2L on 2/25   Hypovolemia   Post op respiratory insufficiency   Stress hyperglycemia   BPH    Jehovah Witness *     Plan:  ====================== NEUROLOGY=====================  Continue close monitoring of neuro status   Tylenol, Gabapentin, PRN Dilaudid, and PRN Oxycodone for analgesia   C/w home bupropion and escitalopram     acetaminophen     Tablet .. 650 milliGRAM(s) Oral every 6 hours  acetaminophen     Tablet .. 650 milliGRAM(s) Oral every 6 hours PRN Mild Pain (1 - 3)  buPROPion XL (24-Hour) . 300 milliGRAM(s) Oral daily  escitalopram 10 milliGRAM(s) Oral daily  gabapentin 100 milliGRAM(s) Oral every 8 hours  HYDROmorphone  Injectable 0.5 milliGRAM(s) IV Push every 6 hours PRN Severe Pain (7 - 10)  oxyCODONE    IR 5 milliGRAM(s) Oral every 4 hours PRN Moderate Pain (4 - 6)  oxyCODONE    IR 10 milliGRAM(s) Oral every 4 hours PRN Severe Pain (7 - 10)    ==================== RESPIRATORY======================  Supplemental O2 via 2L NC   Encourage incentive spirometry, continue pulse ox monitoring, follow ABGs     ====================CARDIOVASCULAR==================  CAD s/p C2L on 2/25   Intraop THAI: EF 50%, normal BiV function   Continue invasive hemodynamic monitoring   Rate control with Lopressor   ASA/Lipitor for graft patency    aspirin enteric coated 81 milliGRAM(s) Oral daily  atorvastatin 80 milliGRAM(s) Oral daily  metoprolol tartrate 25 milliGRAM(s) Oral every 12 hours    ===================HEMATOLOGIC/ONC ===================  Monitor H&H/Plts      VTE prophylaxis, enoxaparin Injectable 40 milliGRAM(s) SubCutaneous daily    ===================== RENAL =========================  Urology following. Clear for TOV once OOB and ambulating frequently. As per Uro, if fails TOV, patient will need leg bag   Continue monitoring urine output, I&OS, BUN/Cr   Replete lytes PRN. Keep K> 4 and Mg >2  Diuresis with Lasix   Hx of BPH, c/w Flomax     tamsulosin 0.4 milliGRAM(s) Oral daily  furosemide    Tablet 20 milliGRAM(s) Oral daily    ==================== GASTROINTESTINAL===================  Tolerating consistent carb diet   Bowel regimen with Miralax and Senna   Simethicone PRN gas     ascorbic acid 500 milliGRAM(s) Oral two times a day  bisacodyl Suppository 10 milliGRAM(s) Rectal once  multivitamin 1 Tablet(s) Oral daily  GI prophylaxis, pantoprazole    Tablet 40 milliGRAM(s) Oral before breakfast  polyethylene glycol 3350 17 Gram(s) Oral daily  senna 2 Tablet(s) Oral at bedtime  simethicone 80 milliGRAM(s) Chew every 8 hours PRN Gas    =======================    ENDOCRINE  =====================  Stress hyperglycemia, continue glucose control with admelog sliding scale     dextrose 50% Injectable 50 milliLiter(s) IV Push every 15 minutes  insulin lispro (ADMELOG) corrective regimen sliding scale   SubCutaneous Before meals and at bedtime    ========================INFECTIOUS DISEASE================  Afebrile, WBC downtrending 11.69->7.80   Continue trending WBC and monitoring fever curve         Patient requires continuous monitoring with bedside rhythm monitoring, pulse ox monitoring, and intermittent blood gas analysis. Care plan discussed with ICU care team. Patient remained critical and at risk for life threatening decompensation.     By signing my name below, IJazz, attest that this documentation has been prepared under the direction and in the presence of WINNIE Ayala   Electronically signed: Rony Valentin, 02-28-22 @ 07:19    I, Job Tsai, personally performed the services described in this documentation. all medical record entries made by the rony were at my direction and in my presence. I have reviewed the chart and agree that the record reflects my personal performance and is accurate and complete  Electronically signed: WINNIE Ayala        MICHAEL RANDHAWA  MRN-[id number]  Patient is a 61y old  Male who presents with a chief complaint of chest pain, SOB (27 Feb 2022 08:25)    HPI:  Pt is 62 y/o Jew M w/ PMH depression, HLD, HTN, CAD s/p stents (2015 Jan U, June [place], on Brilinta in past,  only now), inguinal hernia s/p repair, cholecystectomy (2012), and appendectomy (1970). Per pt on 1/22 began feeling L sided chest pressure and SOB, took an aspirin and felt better. Following day the pain returned and he went to [facility] for evaluation, where per pt serial troponins and EKG were done pt told they were negative and he was sent home. TTE was also done that day with mild hypokinesis of the inferior wall, LVEF 50-55%. His home cardiologist instructed him to go for a stress test, which he did on 2/2/22.   He then went for cardiac cath on 2/10, attempted catheterization of R wrist unsuccessful, cath proceeded in R groin. Pt was told that one of his stents was occluded and another was "down 50%".     Pt transferred to [facility] for CABG workup. He expressed interest in "bloodless" surgery due to his JW beliefs.  (11 Feb 2022 02:22)      Surgery/Hospital Course:  2/25 C2L     Today:  Lopressor increased overnight 12.5mg->25mg [id number] for rate control. Ross removed this AM. Ambulate as tolerated. Patient is a candidate for transfer out of ICU.      REVIEW OF SYSTEMS:  Gen: No fever  EYES/ENT: No visual changes;  No vertigo or throat pain   NECK: No pain   RES:  No shortness of breath or Cough  CARD: No chest pain   GI: No abdominal pain  : No dysuria  NEURO: No weakness  SKIN: No itching, rashes     ICU Vital Signs Last 24 Hrs  T(C): 37 (28 Feb 2022 04:00), Max: 37.1 (27 Feb 2022 08:00)  T(F): 98.6 (28 Feb 2022 04:00), Max: 98.8 (27 Feb 2022 08:00)  HR: 79 (28 Feb 2022 07:00) (72 - 98)  BP: 113/62 (28 Feb 2022 07:00) (100/61 - 129/75)  BP(mean): 79 (28 Feb 2022 07:00) (74 - 96)  ABP: 117/55 (27 Feb 2022 09:00) (117/55 - 136/62)  ABP(mean): 76 (27 Feb 2022 09:00) (76 - 89)  RR: 18 (28 Feb 2022 01:00) (10 - 24)  SpO2: 98% (28 Feb 2022 07:00) (93% - 99%)      Physical Exam:  Gen:  Awake, alert   CNS: non focal 	  Neck: no JVD  RES : clear , no wheezing              CVS: Regular  rhythm. Normal S1/S2  Abd: Soft, non-distended. Bowel sounds present.  Skin: No rash.  Ext:  no edema    ============================I/O===========================   I&O's Detail    27 Feb 2022 07:01  -  28 Feb 2022 07:00  --------------------------------------------------------  IN:    IV PiggyBack: 250 mL    sodium chloride 0.9%: 40 mL  Total IN: 290 mL    OUT:    Indwelling Catheter - Urethral (mL): 1140 mL  Total OUT: 1140 mL    Total NET: -850 mL        ============================ LABS =========================                        11.8   7.80  )-----------( 159      ( 28 Feb 2022 00:47 )             36.1     02-28    141  |  103  |  15  ----------------------------<  108<H>  4.0   |  25  |  0.93    Ca    8.8      28 Feb 2022 00:47  Phos  1.5     02-28  Mg     2.1     02-28    TPro  6.3  /  Alb  4.1  /  TBili  0.6  /  DBili  x   /  AST  23  /  ALT  33  /  AlkPhos  48  02-28    LIVER FUNCTIONS - ( 28 Feb 2022 00:47 )  Alb: 4.1 g/dL / Pro: 6.3 g/dL / ALK PHOS: 48 U/L / ALT: 33 U/L / AST: 23 U/L / GGT: x             ABG - ( 27 Feb 2022 00:33 )  pH, Arterial: 7.57  pH, Blood: x     /  pCO2: 24    /  pO2: 90    / HCO3: 22    / Base Excess: 1.3   /  SaO2: 98.8                ======================Micro/Rad/Cardio=================  CXR: Reviewed  Echo:Reviewed  ======================================================  PAST MEDICAL & SURGICAL HISTORY:  Anxiety and depression    Hyperlipidemia    Hypertension    CAD (coronary artery disease)    History of appendectomy    S/P cholecystectomy    S/P inguinal hernia repair      ====================ASSESSMENT ==============  CAD s/p C2L on 2/25   Hypovolemia   Post op respiratory insufficiency   Stress hyperglycemia   BPH    Jehovah Witness *     Plan:  ====================== NEUROLOGY=====================  Continue close monitoring of neuro status   Tylenol, Gabapentin, PRN Dilaudid, and PRN Oxycodone for analgesia   C/w home bupropion and escitalopram   Ambulate as tolerated    acetaminophen     Tablet .. 650 milliGRAM(s) Oral every 6 hours  acetaminophen     Tablet .. 650 milliGRAM(s) Oral every 6 hours PRN Mild Pain (1 - 3)  buPROPion XL (24-Hour) . 300 milliGRAM(s) Oral daily  escitalopram 10 milliGRAM(s) Oral daily  gabapentin 100 milliGRAM(s) Oral every 8 hours  HYDROmorphone  Injectable 0.5 milliGRAM(s) IV Push every 6 hours PRN Severe Pain (7 - 10)  oxyCODONE    IR 5 milliGRAM(s) Oral every 4 hours PRN Moderate Pain (4 - 6)  oxyCODONE    IR 10 milliGRAM(s) Oral every 4 hours PRN Severe Pain (7 - 10)    ==================== RESPIRATORY======================  Supplemental O2 via 2L NC   Encourage incentive spirometry, continue pulse ox monitoring, follow ABGs     ====================CARDIOVASCULAR==================  CAD s/p C2L on 2/25   Intraop THAI: EF 50%, normal BiV function   Continue invasive hemodynamic monitoring   Lopressor increased overnight 12.5mg->25mg [id number] for rate control  ASA/Lipitor for graft patency    aspirin enteric coated 81 milliGRAM(s) Oral daily  atorvastatin 80 milliGRAM(s) Oral daily  metoprolol tartrate 25 milliGRAM(s) Oral every 12 hours    ===================HEMATOLOGIC/ONC ===================  Monitor H&H/Plts      VTE prophylaxis, enoxaparin Injectable 40 milliGRAM(s) SubCutaneous daily    ===================== RENAL =========================  Urology following. Clear for TOV once OOB and ambulating frequently. As per Uro, if fails TOV, patient will need leg bag   Continue monitoring urine output, I&OS, BUN/Cr   Replete lytes PRN. Keep K> 4 and Mg >2  Diuresis with Lasix   Hx of BPH, c/w Flomax   Hawkins removed this AM    tamsulosin 0.4 milliGRAM(s) Oral daily  furosemide    Tablet 20 milliGRAM(s) Oral daily    ==================== GASTROINTESTINAL===================  Tolerating consistent carb diet   Bowel regimen with Miralax and Senna   Simethicone PRN gas     ascorbic acid 500 milliGRAM(s) Oral two times a day  bisacodyl Suppository 10 milliGRAM(s) Rectal once  multivitamin 1 Tablet(s) Oral daily  GI prophylaxis, pantoprazole    Tablet 40 milliGRAM(s) Oral before breakfast  polyethylene glycol 3350 17 Gram(s) Oral daily  senna 2 Tablet(s) Oral at bedtime  simethicone 80 milliGRAM(s) Chew every 8 hours PRN Gas    =======================    ENDOCRINE  =====================  Stress hyperglycemia, continue glucose control with admelog sliding scale     dextrose 50% Injectable 50 milliLiter(s) IV Push every 15 minutes  insulin lispro (ADMELOG) corrective regimen sliding scale   SubCutaneous Before meals and at bedtime    ========================INFECTIOUS DISEASE================  Afebrile, WBC downtrending 11.69->7.80   Continue trending WBC and monitoring fever curve         Patient requires continuous monitoring with bedside rhythm monitoring, pulse ox monitoring, and intermittent blood gas analysis. Care plan discussed with ICU care team. Patient remained critical and at risk for life threatening decompensation.     By signing my name below, I, Jazz Montgomery, attest that this documentation has been prepared under the direction and in the presence of WINNIE Ayala   Electronically signed: Angella Valentin, 02-28-22 @ 07:19    I, Job Tsai, personally performed the services described in this documentation. all medical record entries made by the roseannibpaddy were at my direction and in my presence. I have reviewed the chart and agree that the record reflects my personal performance and is accurate and complete  Electronically signed: WINNIE Ayala        MICHAEL RANDHAWA  MRN-[id number]  Patient is a 61y old  Male who presents with a chief complaint of chest pain, SOB (27 Feb 2022 08:25)    HPI:  Pt is 60 y/o Gnosticism M w/ PMH depression, HLD, HTN, CAD s/p stents (2015 Jan U, June [place], on Brilinta in past,  only now), inguinal hernia s/p repair, cholecystectomy (2012), and appendectomy (1970). Per pt on 1/22 began feeling L sided chest pressure and SOB, took an aspirin and felt better. Following day the pain returned and he went to [facility] for evaluation, where per pt serial troponins and EKG were done pt told they were negative and he was sent home. TTE was also done that day with mild hypokinesis of the inferior wall, LVEF 50-55%. His home cardiologist instructed him to go for a stress test, which he did on 2/2/22.   He then went for cardiac cath on 2/10, attempted catheterization of R wrist unsuccessful, cath proceeded in R groin. Pt was told that one of his stents was occluded and another was "down 50%".     Pt transferred to [facility] for CABG workup. He expressed interest in "bloodless" surgery due to his JW beliefs.  (11 Feb 2022 02:22)      Surgery/Hospital Course:  2/25 C2L     Today:  Lopressor increased overnight 12.5mg->25mg [id number] for rate control. Ross removed this AM. Ambulate as tolerated. Patient is a candidate for transfer out of ICU.      REVIEW OF SYSTEMS:  Gen: No fever  EYES/ENT: No visual changes;  No vertigo or throat pain   NECK: No pain   RES:  No shortness of breath or Cough  CARD: No chest pain   GI: No abdominal pain  : No dysuria  NEURO: No weakness  SKIN: No itching, rashes     ICU Vital Signs Last 24 Hrs  T(C): 37 (28 Feb 2022 04:00), Max: 37.1 (27 Feb 2022 08:00)  T(F): 98.6 (28 Feb 2022 04:00), Max: 98.8 (27 Feb 2022 08:00)  HR: 79 (28 Feb 2022 07:00) (72 - 98)  BP: 113/62 (28 Feb 2022 07:00) (100/61 - 129/75)  BP(mean): 79 (28 Feb 2022 07:00) (74 - 96)  ABP: 117/55 (27 Feb 2022 09:00) (117/55 - 136/62)  ABP(mean): 76 (27 Feb 2022 09:00) (76 - 89)  RR: 18 (28 Feb 2022 01:00) (10 - 24)  SpO2: 98% (28 Feb 2022 07:00) (93% - 99%)      Physical Exam:  Gen:  Awake, alert   CNS: non focal 	  Neck: no JVD  RES : clear , no wheezing              CVS: Regular  rhythm. Normal S1/S2  Abd: Soft, non-distended. Bowel sounds present.  Skin: No rash.  Ext:  no edema    ============================I/O===========================   I&O's Detail    27 Feb 2022 07:01  -  28 Feb 2022 07:00  --------------------------------------------------------  IN:    IV PiggyBack: 250 mL    sodium chloride 0.9%: 40 mL  Total IN: 290 mL    OUT:    Indwelling Catheter - Urethral (mL): 1140 mL  Total OUT: 1140 mL    Total NET: -850 mL      ============================ LABS =========================                        11.8   7.80  )-----------( 159      ( 28 Feb 2022 00:47 )             36.1     02-28    141  |  103  |  15  ----------------------------<  108<H>  4.0   |  25  |  0.93    Ca    8.8      28 Feb 2022 00:47  Phos  1.5     02-28  Mg     2.1     02-28    TPro  6.3  /  Alb  4.1  /  TBili  0.6  /  DBili  x   /  AST  23  /  ALT  33  /  AlkPhos  48  02-28    LIVER FUNCTIONS - ( 28 Feb 2022 00:47 )  Alb: 4.1 g/dL / Pro: 6.3 g/dL / ALK PHOS: 48 U/L / ALT: 33 U/L / AST: 23 U/L / GGT: x             ABG - ( 27 Feb 2022 00:33 )  pH, Arterial: 7.57  pH, Blood: x     /  pCO2: 24    /  pO2: 90    / HCO3: 22    / Base Excess: 1.3   /  SaO2: 98.8        ======================Micro/Rad/Cardio=================  CXR: Reviewed  Echo:Reviewed  ======================================================  PAST MEDICAL & SURGICAL HISTORY:  Anxiety and depression    Hyperlipidemia    Hypertension    CAD (coronary artery disease)    History of appendectomy    S/P cholecystectomy    S/P inguinal hernia repair      ====================ASSESSMENT ==============  CAD s/p C2L on 2/25   Hypovolemia   Post op respiratory insufficiency   Stress hyperglycemia   BPH    Jehovah Witness *     Plan:  ====================== NEUROLOGY=====================  Continue close monitoring of neuro status   Tylenol, Gabapentin, PRN Dilaudid, and PRN Oxycodone for analgesia   C/w home bupropion and escitalopram   Ambulate as tolerated    acetaminophen     Tablet .. 650 milliGRAM(s) Oral every 6 hours  acetaminophen     Tablet .. 650 milliGRAM(s) Oral every 6 hours PRN Mild Pain (1 - 3)  buPROPion XL (24-Hour) . 300 milliGRAM(s) Oral daily  escitalopram 10 milliGRAM(s) Oral daily  gabapentin 100 milliGRAM(s) Oral every 8 hours  HYDROmorphone  Injectable 0.5 milliGRAM(s) IV Push every 6 hours PRN Severe Pain (7 - 10)  oxyCODONE    IR 5 milliGRAM(s) Oral every 4 hours PRN Moderate Pain (4 - 6)  oxyCODONE    IR 10 milliGRAM(s) Oral every 4 hours PRN Severe Pain (7 - 10)    ==================== RESPIRATORY======================  Supplemental O2 via 2L NC   Encourage incentive spirometry, continue pulse ox monitoring, follow ABGs     ====================CARDIOVASCULAR==================  CAD s/p C2L on 2/25   Intraop THAI: EF 50%, normal BiV function   Continue invasive hemodynamic monitoring   Lopressor increased overnight 12.5mg->25mg [id number] for rate control  ASA/Lipitor for graft patency    aspirin enteric coated 81 milliGRAM(s) Oral daily  atorvastatin 80 milliGRAM(s) Oral daily  metoprolol tartrate 25 milliGRAM(s) Oral every 12 hours    ===================HEMATOLOGIC/ONC ===================  Monitor H&H/Plts      VTE prophylaxis, enoxaparin Injectable 40 milliGRAM(s) SubCutaneous daily    ===================== RENAL =========================  Urology following. Clear for TOV once OOB and ambulating frequently. As per Uro, if fails TOV, patient will need leg bag   Continue monitoring urine output, I&OS, BUN/Cr   Replete lytes PRN. Keep K> 4 and Mg >2  Diuresis with Lasix   Hx of BPH, c/w Flomax   Hawkins removed this AM--voided    tamsulosin 0.4 milliGRAM(s) Oral daily  furosemide    Tablet 20 milliGRAM(s) Oral daily    ==================== GASTROINTESTINAL===================  Tolerating consistent carb diet   Bowel regimen with Miralax and Senna   Simethicone PRN gas     ascorbic acid 500 milliGRAM(s) Oral two times a day  bisacodyl Suppository 10 milliGRAM(s) Rectal once  multivitamin 1 Tablet(s) Oral daily  GI prophylaxis, pantoprazole    Tablet 40 milliGRAM(s) Oral before breakfast  polyethylene glycol 3350 17 Gram(s) Oral daily  senna 2 Tablet(s) Oral at bedtime  simethicone 80 milliGRAM(s) Chew every 8 hours PRN Gas    =======================    ENDOCRINE  =====================  Stress hyperglycemia, continue glucose control with admelog sliding scale     dextrose 50% Injectable 50 milliLiter(s) IV Push every 15 minutes  insulin lispro (ADMELOG) corrective regimen sliding scale   SubCutaneous Before meals and at bedtime    ========================INFECTIOUS DISEASE================  Afebrile, WBC downtrending 11.69->7.80   Continue trending WBC and monitoring fever curve         Patient requires continuous monitoring with bedside rhythm monitoring, pulse ox monitoring, and intermittent blood gas analysis. Care plan discussed with ICU care team. Patient remained critical and at risk for life threatening decompensation.     By signing my name below, IJazz, attest that this documentation has been prepared under the direction and in the presence of WINNIE Ayala   Electronically signed: Angella Valentin, 02-28-22 @ 07:19    IJob, personally performed the services described in this documentation. all medical record entries made by the roseannibe were at my direction and in my presence. I have reviewed the chart and agree that the record reflects my personal performance and is accurate and complete  Electronically signed: WINNIE Ayala

## 2022-02-28 NOTE — PROGRESS NOTE ADULT - PROBLEM SELECTOR PROBLEM 2
H/O urinary retention

## 2022-03-01 ENCOUNTER — TRANSCRIPTION ENCOUNTER (OUTPATIENT)
Age: 62
End: 2022-03-01

## 2022-03-01 DIAGNOSIS — Z95.1 PRESENCE OF AORTOCORONARY BYPASS GRAFT: ICD-10-CM

## 2022-03-01 LAB
GLUCOSE BLDC GLUCOMTR-MCNC: 100 MG/DL — HIGH (ref 70–99)
GLUCOSE BLDC GLUCOMTR-MCNC: 101 MG/DL — HIGH (ref 70–99)
GLUCOSE BLDC GLUCOMTR-MCNC: 106 MG/DL — HIGH (ref 70–99)
GLUCOSE BLDC GLUCOMTR-MCNC: 107 MG/DL — HIGH (ref 70–99)

## 2022-03-01 PROCEDURE — 71045 X-RAY EXAM CHEST 1 VIEW: CPT | Mod: 26

## 2022-03-01 PROCEDURE — 99291 CRITICAL CARE FIRST HOUR: CPT | Mod: 24

## 2022-03-01 RX ORDER — METOPROLOL TARTRATE 50 MG
100 TABLET ORAL
Refills: 0 | Status: DISCONTINUED | OUTPATIENT
Start: 2022-03-01 | End: 2022-03-02

## 2022-03-01 RX ORDER — METOPROLOL TARTRATE 50 MG
25 TABLET ORAL ONCE
Refills: 0 | Status: COMPLETED | OUTPATIENT
Start: 2022-03-01 | End: 2022-03-01

## 2022-03-01 RX ORDER — METOPROLOL TARTRATE 50 MG
25 TABLET ORAL EVERY 8 HOURS
Refills: 0 | Status: DISCONTINUED | OUTPATIENT
Start: 2022-03-01 | End: 2022-03-01

## 2022-03-01 RX ORDER — BUDESONIDE, MICRONIZED 100 %
0.25 POWDER (GRAM) MISCELLANEOUS ONCE
Refills: 0 | Status: COMPLETED | OUTPATIENT
Start: 2022-03-01 | End: 2022-03-01

## 2022-03-01 RX ORDER — METOPROLOL TARTRATE 50 MG
100 TABLET ORAL DAILY
Refills: 0 | Status: DISCONTINUED | OUTPATIENT
Start: 2022-03-01 | End: 2022-03-01

## 2022-03-01 RX ORDER — BUDESONIDE, MICRONIZED 100 %
0.5 POWDER (GRAM) MISCELLANEOUS
Refills: 0 | Status: DISCONTINUED | OUTPATIENT
Start: 2022-03-01 | End: 2022-03-02

## 2022-03-01 RX ADMIN — Medication 0.25 MILLIGRAM(S): at 06:07

## 2022-03-01 RX ADMIN — ESCITALOPRAM OXALATE 10 MILLIGRAM(S): 10 TABLET, FILM COATED ORAL at 13:01

## 2022-03-01 RX ADMIN — PANTOPRAZOLE SODIUM 40 MILLIGRAM(S): 20 TABLET, DELAYED RELEASE ORAL at 03:25

## 2022-03-01 RX ADMIN — OXYCODONE HYDROCHLORIDE 10 MILLIGRAM(S): 5 TABLET ORAL at 23:40

## 2022-03-01 RX ADMIN — GABAPENTIN 100 MILLIGRAM(S): 400 CAPSULE ORAL at 21:04

## 2022-03-01 RX ADMIN — GABAPENTIN 100 MILLIGRAM(S): 400 CAPSULE ORAL at 13:22

## 2022-03-01 RX ADMIN — ENOXAPARIN SODIUM 40 MILLIGRAM(S): 100 INJECTION SUBCUTANEOUS at 13:02

## 2022-03-01 RX ADMIN — OXYCODONE HYDROCHLORIDE 10 MILLIGRAM(S): 5 TABLET ORAL at 21:04

## 2022-03-01 RX ADMIN — Medication 81 MILLIGRAM(S): at 13:01

## 2022-03-01 RX ADMIN — GABAPENTIN 100 MILLIGRAM(S): 400 CAPSULE ORAL at 03:25

## 2022-03-01 RX ADMIN — Medication 20 MILLIGRAM(S): at 03:25

## 2022-03-01 RX ADMIN — Medication 0.5 MILLIGRAM(S): at 18:03

## 2022-03-01 RX ADMIN — Medication 100 MILLIGRAM(S): at 14:57

## 2022-03-01 RX ADMIN — Medication 500 MILLIGRAM(S): at 03:25

## 2022-03-01 RX ADMIN — BUPROPION HYDROCHLORIDE 300 MILLIGRAM(S): 150 TABLET, EXTENDED RELEASE ORAL at 13:02

## 2022-03-01 RX ADMIN — ATORVASTATIN CALCIUM 80 MILLIGRAM(S): 80 TABLET, FILM COATED ORAL at 13:01

## 2022-03-01 RX ADMIN — Medication 500 MILLIGRAM(S): at 17:07

## 2022-03-01 RX ADMIN — Medication 1 TABLET(S): at 13:02

## 2022-03-01 RX ADMIN — Medication 25 MILLIGRAM(S): at 05:57

## 2022-03-01 NOTE — PROGRESS NOTE ADULT - PROVIDER SPECIALTY LIST ADULT
CT Surgery
Critical Care
CT Surgery
Critical Care
Critical Care
Urology
Urology
CT Surgery
Critical Care
Urology
CT Surgery

## 2022-03-01 NOTE — PROGRESS NOTE ADULT - PROBLEM SELECTOR PROBLEM 1
Coronary disease
S/P CABG x 2
Coronary disease

## 2022-03-01 NOTE — PROGRESS NOTE ADULT - PROBLEM SELECTOR PLAN 1
Asa, Statin, B-blocker, Chest PT,  Incentive spirometry, wound care and assessment.  Ambulate   increase betablocker  d/c planning

## 2022-03-01 NOTE — PROGRESS NOTE ADULT - REASON FOR ADMISSION
chest pain, SOB
chest pain, SOB    preop CABG
chest pain, SOB
preop  CABG
chest pain, SOB

## 2022-03-01 NOTE — PROGRESS NOTE ADULT - SUBJECTIVE AND OBJECTIVE BOX
Patient seen and examined at the bedside.    Remained critically ill on continuous ICU monitoring.    =================== LABS =========================                        11.8   7.80  )-----------( 159      ( 28 Feb 2022 00:47 )             36.1     02-28    141  |  103  |  15  ----------------------------<  108<H>  4.0   |  25  |  0.93    Ca    8.8      28 Feb 2022 00:47  Phos  1.5     02-28  Mg     2.1     02-28    TPro  6.3  /  Alb  4.1  /  TBili  0.6  /  DBili  x   /  AST  23  /  ALT  33  /  AlkPhos  48  02-28    LIVER FUNCTIONS - ( 28 Feb 2022 00:47 )  Alb: 4.1 g/dL / Pro: 6.3 g/dL / ALK PHOS: 48 U/L / ALT: 33 U/L / AST: 23 U/L / GGT: x             ==================================================    OBJECTIVE:  Vital Signs Last 24 Hrs  T(C): 37 (01 Mar 2022 08:00), Max: 37.1 (28 Feb 2022 20:00)  T(F): 98.6 (01 Mar 2022 08:00), Max: 98.7 (28 Feb 2022 20:00)  HR: 91 (01 Mar 2022 08:00) (80 - 101)  BP: 115/72 (01 Mar 2022 08:00) (99/58 - 130/85)  BP(mean): 88 (01 Mar 2022 08:00) (72 - 100)  RR: 23 (01 Mar 2022 08:00) (19 - 27)  SpO2: 95% (01 Mar 2022 08:00) (91% - 99%)    REVIEW OF SYSTEMS:  Gen: No fever  EYES/ENT: No visual changes;  No vertigo or throat pain   NECK: No pain   RES:  No shortness of breath or Cough  CARD: No chest pain   GI: No abdominal pain  : No dysuria  NEURO: No weakness  SKIN: No itching, rashes     Physical Exam:  General: NAD    Neurology: nonfocal   Eyes: bilateral pupils equal and reactive   ENT/Neck: Neck supple, trachea midline, No JVD   Respiratory: clear to auscultation bilaterally   CV: RRR, S1S2, no murmurs        [x] Sternal dressing          [x] Sinus rhythm   Abdominal: Soft, NT, ND +BS,   Extremities: 1-2+ pedal edema noted, + peripheral pulses   Skin: No Rashes, Hematoma, Ecchymosis                           Assessment:  60 y/o M w/ PMH of depression, HLD, HTN, CAD s/p stents (2015 [place], [place], on Brilinta in past,  only now), inguinal hernia s/p repair, cholecystectomy (2012), and appendectomy (1970).    CAD s/p C2L on 2/25   Hypovolemia   Post op respiratory insufficiency   Stress hyperglycemia   BPH    Jehovah Witness *       Plan:   ***Neuro***  [x] Nonfocal     Post operative neuro assessment   Home [x] Bupropion [x] Escitalopram     ***Cardiovascular***  Invasive hemodynamic monitoring, assess perfusion indices   SR / Hct 36.1 / Lactate 1.0   Continuous reassessment of hemodynamics    Lopressor for rate control   [x] ASA [x] Statin   VTE prophylaxis with [x] Lovenox   Serial EKG and cardiac enzymes     ***Pulmonary***  Stable on RA   Encourage incentive spirometry, continue pulse ox monitoring, follow ABGs     ***GI***  [x] Diet:  Tolerating consistent carb diet   [x] Protonix      ***Renal***  GFR 90   Continue to monitor I/Os, BUN/Creatinine.   Replete lytes PRN  Hawkins present [ x] negative    Diuresis with Lasix   Hx of BPH, c/w Flomax     ***ID***  No active antibiotic coverage     ***Endocrine***  [x] Stress Hyperglycemia  : HbA1c 6.4 %                -  [x] ISS                - Need tight glycemic control to prevent wound infection.          Patient requires continuous monitoring with bedside rhythm monitoring, pulse oximetry monitoring, and continuous central venous and arterial pressure monitoring; and intermittent blood gas analysis. Care plan discussed with the ICU care team.   Patient remained critical, at risk for life threatening decompensation.    By signing my name below, I, Jazz Montgomery, attest that this documentation has been prepared under the direction and in the presence of Tangela Ford NP   Electronically signed: Angella Valentin, 03-01-22 @ 08:17    I, Tangela Ford, personally performed the services described in this documentation. all medical record entries made by the roseannibe were at my direction and in my presence. I have reviewed the chart and agree that the record reflects my personal performance and is accurate and complete  Electronically signed: Tangela Ford NP  Patient seen and examined at the bedside.    Remained critically ill on continuous ICU monitoring.    =================== LABS =========================                        11.8   7.80  )-----------( 159      ( 28 Feb 2022 00:47 )             36.1     02-28    141  |  103  |  15  ----------------------------<  108<H>  4.0   |  25  |  0.93    Ca    8.8      28 Feb 2022 00:47  Phos  1.5     02-28  Mg     2.1     02-28    TPro  6.3  /  Alb  4.1  /  TBili  0.6  /  DBili  x   /  AST  23  /  ALT  33  /  AlkPhos  48  02-28    LIVER FUNCTIONS - ( 28 Feb 2022 00:47 )  Alb: 4.1 g/dL / Pro: 6.3 g/dL / ALK PHOS: 48 U/L / ALT: 33 U/L / AST: 23 U/L / GGT: x             ==================================================    OBJECTIVE:  Vital Signs Last 24 Hrs  T(C): 37 (01 Mar 2022 08:00), Max: 37.1 (28 Feb 2022 20:00)  T(F): 98.6 (01 Mar 2022 08:00), Max: 98.7 (28 Feb 2022 20:00)  HR: 91 (01 Mar 2022 08:00) (80 - 101)  BP: 115/72 (01 Mar 2022 08:00) (99/58 - 130/85)  BP(mean): 88 (01 Mar 2022 08:00) (72 - 100)  RR: 23 (01 Mar 2022 08:00) (19 - 27)  SpO2: 95% (01 Mar 2022 08:00) (91% - 99%)    REVIEW OF SYSTEMS:  Gen: No fever  EYES/ENT: No visual changes;  No vertigo or throat pain   NECK: No pain   RES:  No shortness of breath or Cough  CARD: No chest pain   GI: No abdominal pain  : No dysuria  NEURO: No weakness  SKIN: No itching, rashes     Physical Exam:  General: NAD    Neurology: nonfocal   Eyes: bilateral pupils equal and reactive   ENT/Neck: Neck supple, trachea midline, No JVD   Respiratory: clear to auscultation bilaterally   CV: RRR, S1S2, no murmurs        [x] Sternal dressing          [x] Sinus rhythm   Abdominal: Soft, NT, ND +BS,   Extremities: 1-2+ pedal edema noted, + peripheral pulses   Skin: No Rashes, Hematoma, Ecchymosis                           Assessment:  60 y/o M w/ PMH of depression, HLD, HTN, CAD s/p stents (2015 Jan [facility], [place], on Brilinta in past,  only now), inguinal hernia s/p repair, cholecystectomy (2012), and appendectomy (1970).    CAD s/p C2L on 2/25   Hypovolemia   Post op respiratory insufficiency   Stress hyperglycemia   BPH    Jehovah Witness *       Plan:   ***Neuro***  [x] Nonfocal     Post operative neuro assessment   Home [x] Bupropion [x] Escitalopram   Ambulate as tolerated     ***Cardiovascular***  Invasive hemodynamic monitoring, assess perfusion indices   SR / Hct 36.1 / Lactate 1.0   Continuous reassessment of hemodynamics    Lopressor for rate control   [x] ASA [x] Statin   VTE prophylaxis with [x] Lovenox   Serial EKG and cardiac enzymes     ***Pulmonary***  Stable on RA   Encourage incentive spirometry, continue pulse ox monitoring, follow ABGs     ***GI***  [x] Diet:  Tolerating consistent carb diet   [x] Protonix      ***Renal***  GFR 90   Continue to monitor I/Os, BUN/Creatinine.   Replete lytes PRN  Hawkins present [ x] negative    Diuresis with Lasix   Hx of BPH, c/w Flomax     ***ID***  No active antibiotic coverage     ***Endocrine***  [x] Stress Hyperglycemia  : HbA1c 6.4 %                -  [x] ISS                - Need tight glycemic control to prevent wound infection.          Patient requires continuous monitoring with bedside rhythm monitoring, pulse oximetry monitoring, and continuous central venous and arterial pressure monitoring; and intermittent blood gas analysis. Care plan discussed with the ICU care team.   Patient remained critical, at risk for life threatening decompensation.    By signing my name below, I, Jazz Montgomery, attest that this documentation has been prepared under the direction and in the presence of Tangela Ford NP   Electronically signed: Angella Valentin, 03-01-22 @ 08:17    I, Tangela Ford, personally performed the services described in this documentation. all medical record entries made by the roseannibpaddy were at my direction and in my presence. I have reviewed the chart and agree that the record reflects my personal performance and is accurate and complete  Electronically signed: Tangela Ford NP  Patient seen and examined at the bedside.    Remained critically ill on continuous ICU monitoring.    =================== LABS =========================                        11.8   7.80  )-----------( 159      ( 28 Feb 2022 00:47 )             36.1     02-28    141  |  103  |  15  ----------------------------<  108<H>  4.0   |  25  |  0.93    Ca    8.8      28 Feb 2022 00:47  Phos  1.5     02-28  Mg     2.1     02-28    TPro  6.3  /  Alb  4.1  /  TBili  0.6  /  DBili  x   /  AST  23  /  ALT  33  /  AlkPhos  48  02-28    LIVER FUNCTIONS - ( 28 Feb 2022 00:47 )  Alb: 4.1 g/dL / Pro: 6.3 g/dL / ALK PHOS: 48 U/L / ALT: 33 U/L / AST: 23 U/L / GGT: x             ==================================================    OBJECTIVE:  Vital Signs Last 24 Hrs  T(C): 37 (01 Mar 2022 08:00), Max: 37.1 (28 Feb 2022 20:00)  T(F): 98.6 (01 Mar 2022 08:00), Max: 98.7 (28 Feb 2022 20:00)  HR: 91 (01 Mar 2022 08:00) (80 - 101)  BP: 115/72 (01 Mar 2022 08:00) (99/58 - 130/85)  BP(mean): 88 (01 Mar 2022 08:00) (72 - 100)  RR: 23 (01 Mar 2022 08:00) (19 - 27)  SpO2: 95% (01 Mar 2022 08:00) (91% - 99%)    REVIEW OF SYSTEMS:  Gen: No fever  EYES/ENT: No visual changes;  No vertigo or throat pain   NECK: No pain   RES:  No shortness of breath or Cough  CARD: No chest pain   GI: No abdominal pain  : No dysuria  NEURO: No weakness  SKIN: No itching, rashes     Physical Exam:  General: NAD    Neurology: nonfocal   Eyes: bilateral pupils equal and reactive   ENT/Neck: Neck supple, trachea midline, No JVD   Respiratory: clear to auscultation bilaterally   CV: RRR, S1S2, no murmurs        [x] Sternal dressing          [x] Sinus rhythm   Abdominal: Soft, NT, ND +BS,   Extremities: 1-2+ pedal edema noted, + peripheral pulses   Skin: No Rashes, Hematoma, Ecchymosis                           Assessment:  62 y/o M w/ PMH of depression, HLD, HTN, CAD s/p stents (2015 [place], [place], on Brilinta in past,  only now), inguinal hernia s/p repair, cholecystectomy (2012), and appendectomy (1970).    CAD s/p C2L on 2/25   Hypovolemia   Post op respiratory insufficiency   Stress hyperglycemia   BPH    Jehovah Witness *       Plan:   ***Neuro***  [x] Nonfocal     Post operative neuro assessment   Home [x] Bupropion [x] Escitalopram   Ambulate as tolerated     ***Cardiovascular***  Invasive hemodynamic monitoring, assess perfusion indices   SR / Hct 36.1 / Lactate 1.0   Continuous reassessment of hemodynamics    Lopressor for rate control   [x] ASA [x] Statin   VTE prophylaxis with [x] Lovenox   Serial EKG and cardiac enzymes     ***Pulmonary***  Stable on RA   Encourage incentive spirometry, continue pulse ox monitoring, follow ABGs , Repeat CXR left lower lobe haziness Effusion VS atlectasis    ***GI***  [x] Diet:  Tolerating consistent carb diet   [x] Protonix      ***Renal***  GFR 90   Continue to monitor I/Os, BUN/Creatinine.   Replete lytes PRN  Hawkins present [ x] negative    Diuresis with Lasix   Hx of BPH, c/w Flomax     ***ID***  No active antibiotic coverage     ***Endocrine***  [x] Stress Hyperglycemia  : HbA1c 6.4 %                -  [x] ISS                - Need tight glycemic control to prevent wound infection.          Patient requires continuous monitoring with bedside rhythm monitoring, pulse oximetry monitoring, and continuous central venous and arterial pressure monitoring; and intermittent blood gas analysis. Care plan discussed with the ICU care team.   Patient remained critical, at risk for life threatening decompensation.    By signing my name below, I, Jazz Montgomery, attest that this documentation has been prepared under the direction and in the presence of Tangela Ford NP   Electronically signed: Rony Valentin, 03-01-22 @ 08:17    I, Tangela Ford, personally performed the services described in this documentation. all medical record entries made by the rony were at my direction and in my presence. I have reviewed the chart and agree that the record reflects my personal performance and is accurate and complete  Electronically signed: Tangela Ford NP

## 2022-03-01 NOTE — PROGRESS NOTE ADULT - ACP WITH SCRIBE
I personally performed the service described in the documentation  recorded by the scribe in my presence, and it accurately and completely records my words and actions.

## 2022-03-01 NOTE — PROGRESS NOTE ADULT - ASSESSMENT
62 y/o Confucianist M w/ PMH depression, HLD, HTN, CAD s/p stents (2015 Jan [facility], June [place]).   Presented [facility] for CP/SOB, further workup output revealed stenosis of prior stents/ CAD  2/11   no chest pain   on coreq,  asa   ator  2/12 VSS: continue current medication asa/ statin/ bb; continue epogen and iron  2/13 VSS - rounds made w/ DR. Skaggs.  Dr. Aaron to see pt in am - no OR date as of yet  2/14 VSS; carotids neg sig stenosis; echo minimal MR/ neg AR ef 67%; continue asa/ statin/ bb; continue epogen   2/15 VSS Reports L chest "pressure" intermittently w/o radiation  several times daily  4/10  Await OR date- d/w DR. Aaron; continue to minimize blood work   2/16  Increase retacrit  3 x's weekly.  Rad artery sxac.  Urology  consult for h/o retention  2/17 VSS CP free Dr Goldberg to see patient today  2/18 Flomax for history retention  OR date to be determined  2/19 Painfree AM labs as per Dr Aaron  2/20 Painfree Await OR date  2/21     vs s  ambulating  HCT 44  2/22 VSS, awaiting OR date, continue optimization for surgery  2/23 VSS, scheduled for CABG Friday with Dr. Aaron, will check labs in AM.  2/24 NPO for CABG in am    2/25 s/p  CABGx2 (LIMA-LAD, SVG-OM)  EF 50%, no blood products  Post op splinting-pain management  extubated d 0  + pressors  Urology following for h/o retention, tov when nambulatory  2/28 Hawkins out , beta blockers titrated up  3/1 Transferred to floor  3/1 LLL effusion vs atelectasis, US with small amt of fluid/ atelectasis.  The patient is requesting to be d/c home.  O 2 sat 94 with ambulation.

## 2022-03-01 NOTE — DISCHARGE NOTE NURSING/CASE MANAGEMENT/SOCIAL WORK - NSDCPEFALRISK_GEN_ALL_CORE
For information on Fall & Injury Prevention, visit: [email or web address] OR  [email or web address] OR  https://www.cdc.gov/steadi/patient.html

## 2022-03-01 NOTE — DISCHARGE NOTE NURSING/CASE MANAGEMENT/SOCIAL WORK - PATIENT PORTAL LINK FT
You can access the FollowMyHealth Patient Portal offered by [facility] by registering at the following website: [email or web address]. By joining [facility]’s FollowMyHealth portal, you will also be able to view your health information using other applications (apps) compatible with our system.

## 2022-03-01 NOTE — PROGRESS NOTE ADULT - SUBJECTIVE AND OBJECTIVE BOX
VITAL SIGNS    Telemetry:  nsr 90    Vital Signs Last 24 Hrs  T(C): 36.7 (22 @ 10:01), Max: 37.1 (22 @ 20:00)  T(F): 98 (22 @ 10:01), Max: 98.7 (22 @ 20:00)  HR: 96 (22 @ 10:01) (84 - 101)  BP: 124/71 (22 @ 10:01) (102/63 - 131/78)  RR: 20 (22 @ 10:01) (19 - 28)  SpO2: 95% (22 @ 10:01) (91% - 97%)                    @ 07:01  -   @ 07:00  --------------------------------------------------------  IN: 600 mL / OUT: 1300 mL / NET: -700 mL     @ 07:01  -   @ 14:06  --------------------------------------------------------  IN: 240 mL / OUT: 300 mL / NET: -60 mL          Daily     Daily Weight in k (01 Mar 2022 00:00)            CAPILLARY BLOOD GLUCOSE      POCT Blood Glucose.: 106 mg/dL (01 Mar 2022 11:29)  POCT Blood Glucose.: 107 mg/dL (01 Mar 2022 07:17)  POCT Blood Glucose.: 112 mg/dL (2022 21:15)  POCT Blood Glucose.: 117 mg/dL (2022 16:41)                Pacing Wires      No    Coumadin    [ ] YES          [ x ]      NO                                   PHYSICAL EXAM        Neurology: alert and oriented x 3, nonfocal, no gross deficits  CV : s1 s2 RRR  Sternal Wound :  CDI , Stable  Lungs: cta  Abdomen: soft, nontender, nondistended, positive bowel sounds, last bowel movement                         :    voiding   Extremities:   -   edema   /  -   calve tenderness ,    R leg  incisions cdi          acetaminophen     Tablet .. 650 milliGRAM(s) Oral every 6 hours PRN  ascorbic acid 500 milliGRAM(s) Oral two times a day  aspirin enteric coated 81 milliGRAM(s) Oral daily  atorvastatin 80 milliGRAM(s) Oral daily  bisacodyl Suppository 10 milliGRAM(s) Rectal once  buPROPion XL (24-Hour) . 300 milliGRAM(s) Oral daily  dextrose 50% Injectable 50 milliLiter(s) IV Push every 15 minutes  enoxaparin Injectable 40 milliGRAM(s) SubCutaneous daily  escitalopram 10 milliGRAM(s) Oral daily  furosemide    Tablet 20 milliGRAM(s) Oral daily  gabapentin 100 milliGRAM(s) Oral every 8 hours  HYDROmorphone  Injectable 0.5 milliGRAM(s) IV Push every 6 hours PRN  insulin lispro (ADMELOG) corrective regimen sliding scale   SubCutaneous Before meals and at bedtime  metoprolol succinate  milliGRAM(s) Oral daily  multivitamin 1 Tablet(s) Oral daily  oxyCODONE    IR 5 milliGRAM(s) Oral every 4 hours PRN  oxyCODONE    IR 10 milliGRAM(s) Oral every 4 hours PRN  pantoprazole    Tablet 40 milliGRAM(s) Oral before breakfast  polyethylene glycol 3350 17 Gram(s) Oral daily  senna 2 Tablet(s) Oral at bedtime  simethicone 80 milliGRAM(s) Chew every 8 hours PRN  tamsulosin 0.4 milliGRAM(s) Oral daily                    Physical Therapy Rec:   Home  [  x]   Home w/ PT  [  ]  Rehab  [  ]  Discussed with Cardiothoracic Team at AM rounds.

## 2022-03-02 ENCOUNTER — TRANSCRIPTION ENCOUNTER (OUTPATIENT)
Age: 62
End: 2022-03-02

## 2022-03-02 VITALS
TEMPERATURE: 98 F | OXYGEN SATURATION: 93 % | DIASTOLIC BLOOD PRESSURE: 78 MMHG | HEART RATE: 87 BPM | RESPIRATION RATE: 18 BRPM | SYSTOLIC BLOOD PRESSURE: 109 MMHG

## 2022-03-02 PROBLEM — I10 ESSENTIAL (PRIMARY) HYPERTENSION: Chronic | Status: ACTIVE | Noted: 2022-02-11

## 2022-03-02 PROBLEM — E78.5 HYPERLIPIDEMIA, UNSPECIFIED: Chronic | Status: ACTIVE | Noted: 2022-02-11

## 2022-03-02 PROBLEM — F41.9 ANXIETY DISORDER, UNSPECIFIED: Chronic | Status: ACTIVE | Noted: 2022-02-11

## 2022-03-02 PROBLEM — I25.10 ATHEROSCLEROTIC HEART DISEASE OF NATIVE CORONARY ARTERY WITHOUT ANGINA PECTORIS: Chronic | Status: ACTIVE | Noted: 2022-02-11

## 2022-03-02 LAB
ALBUMIN SERPL ELPH-MCNC: 3.6 G/DL — SIGNIFICANT CHANGE UP (ref 3.3–5)
ALP SERPL-CCNC: 51 U/L — SIGNIFICANT CHANGE UP (ref 40–120)
ALT FLD-CCNC: 31 U/L — SIGNIFICANT CHANGE UP (ref 10–45)
ANION GAP SERPL CALC-SCNC: 10 MMOL/L — SIGNIFICANT CHANGE UP (ref 5–17)
AST SERPL-CCNC: 18 U/L — SIGNIFICANT CHANGE UP (ref 10–40)
BILIRUB SERPL-MCNC: 0.5 MG/DL — SIGNIFICANT CHANGE UP (ref 0.2–1.2)
BUN SERPL-MCNC: 17 MG/DL — SIGNIFICANT CHANGE UP (ref 7–23)
CALCIUM SERPL-MCNC: 9.2 MG/DL — SIGNIFICANT CHANGE UP (ref 8.4–10.5)
CHLORIDE SERPL-SCNC: 104 MMOL/L — SIGNIFICANT CHANGE UP (ref 96–108)
CO2 SERPL-SCNC: 24 MMOL/L — SIGNIFICANT CHANGE UP (ref 22–31)
CREAT SERPL-MCNC: 0.9 MG/DL — SIGNIFICANT CHANGE UP (ref 0.5–1.3)
EGFR: 98 ML/MIN/1.73M2 — SIGNIFICANT CHANGE UP
GLUCOSE BLDC GLUCOMTR-MCNC: 104 MG/DL — HIGH (ref 70–99)
GLUCOSE BLDC GLUCOMTR-MCNC: 112 MG/DL — HIGH (ref 70–99)
GLUCOSE SERPL-MCNC: 106 MG/DL — HIGH (ref 70–99)
HCT VFR BLD CALC: 40 % — SIGNIFICANT CHANGE UP (ref 39–50)
HGB BLD-MCNC: 13 G/DL — SIGNIFICANT CHANGE UP (ref 13–17)
MCHC RBC-ENTMCNC: 29.9 PG — SIGNIFICANT CHANGE UP (ref 27–34)
MCHC RBC-ENTMCNC: 32.5 GM/DL — SIGNIFICANT CHANGE UP (ref 32–36)
MCV RBC AUTO: 92 FL — SIGNIFICANT CHANGE UP (ref 80–100)
NRBC # BLD: 0 /100 WBCS — SIGNIFICANT CHANGE UP (ref 0–0)
PLATELET # BLD AUTO: 222 K/UL — SIGNIFICANT CHANGE UP (ref 150–400)
POTASSIUM SERPL-MCNC: 3.9 MMOL/L — SIGNIFICANT CHANGE UP (ref 3.5–5.3)
POTASSIUM SERPL-SCNC: 3.9 MMOL/L — SIGNIFICANT CHANGE UP (ref 3.5–5.3)
PROT SERPL-MCNC: 6.3 G/DL — SIGNIFICANT CHANGE UP (ref 6–8.3)
RBC # BLD: 4.35 M/UL — SIGNIFICANT CHANGE UP (ref 4.2–5.8)
RBC # FLD: 14.9 % — HIGH (ref 10.3–14.5)
SARS-COV-2 RNA SPEC QL NAA+PROBE: SIGNIFICANT CHANGE UP
SODIUM SERPL-SCNC: 138 MMOL/L — SIGNIFICANT CHANGE UP (ref 135–145)
WBC # BLD: 6.83 K/UL — SIGNIFICANT CHANGE UP (ref 3.8–10.5)
WBC # FLD AUTO: 6.83 K/UL — SIGNIFICANT CHANGE UP (ref 3.8–10.5)

## 2022-03-02 PROCEDURE — 82330 ASSAY OF CALCIUM: CPT

## 2022-03-02 PROCEDURE — 84443 ASSAY THYROID STIM HORMONE: CPT

## 2022-03-02 PROCEDURE — 80053 COMPREHEN METABOLIC PANEL: CPT

## 2022-03-02 PROCEDURE — 85730 THROMBOPLASTIN TIME PARTIAL: CPT

## 2022-03-02 PROCEDURE — C1769: CPT

## 2022-03-02 PROCEDURE — 97162 PT EVAL MOD COMPLEX 30 MIN: CPT

## 2022-03-02 PROCEDURE — 94640 AIRWAY INHALATION TREATMENT: CPT

## 2022-03-02 PROCEDURE — 71045 X-RAY EXAM CHEST 1 VIEW: CPT

## 2022-03-02 PROCEDURE — 82435 ASSAY OF BLOOD CHLORIDE: CPT

## 2022-03-02 PROCEDURE — 87640 STAPH A DNA AMP PROBE: CPT

## 2022-03-02 PROCEDURE — C1889: CPT

## 2022-03-02 PROCEDURE — 94660 CPAP INITIATION&MGMT: CPT

## 2022-03-02 PROCEDURE — 94010 BREATHING CAPACITY TEST: CPT

## 2022-03-02 PROCEDURE — 81003 URINALYSIS AUTO W/O SCOPE: CPT

## 2022-03-02 PROCEDURE — 83880 ASSAY OF NATRIURETIC PEPTIDE: CPT

## 2022-03-02 PROCEDURE — 87641 MR-STAPH DNA AMP PROBE: CPT

## 2022-03-02 PROCEDURE — 93005 ELECTROCARDIOGRAM TRACING: CPT

## 2022-03-02 PROCEDURE — 71045 X-RAY EXAM CHEST 1 VIEW: CPT | Mod: 26

## 2022-03-02 PROCEDURE — 80061 LIPID PANEL: CPT

## 2022-03-02 PROCEDURE — C8929: CPT

## 2022-03-02 PROCEDURE — 86891 AUTOLOGOUS BLOOD OP SALVAGE: CPT

## 2022-03-02 PROCEDURE — 85027 COMPLETE CBC AUTOMATED: CPT

## 2022-03-02 PROCEDURE — 83605 ASSAY OF LACTIC ACID: CPT

## 2022-03-02 PROCEDURE — 86850 RBC ANTIBODY SCREEN: CPT

## 2022-03-02 PROCEDURE — 85018 HEMOGLOBIN: CPT

## 2022-03-02 PROCEDURE — 86923 COMPATIBILITY TEST ELECTRIC: CPT

## 2022-03-02 PROCEDURE — 84100 ASSAY OF PHOSPHORUS: CPT

## 2022-03-02 PROCEDURE — 80048 BASIC METABOLIC PNL TOTAL CA: CPT

## 2022-03-02 PROCEDURE — 83036 HEMOGLOBIN GLYCOSYLATED A1C: CPT

## 2022-03-02 PROCEDURE — P9047: CPT

## 2022-03-02 PROCEDURE — 82962 GLUCOSE BLOOD TEST: CPT

## 2022-03-02 PROCEDURE — U0003: CPT

## 2022-03-02 PROCEDURE — 85014 HEMATOCRIT: CPT

## 2022-03-02 PROCEDURE — U0005: CPT

## 2022-03-02 PROCEDURE — 86769 SARS-COV-2 COVID-19 ANTIBODY: CPT

## 2022-03-02 PROCEDURE — 84295 ASSAY OF SERUM SODIUM: CPT

## 2022-03-02 PROCEDURE — 94002 VENT MGMT INPAT INIT DAY: CPT

## 2022-03-02 PROCEDURE — 82803 BLOOD GASES ANY COMBINATION: CPT

## 2022-03-02 PROCEDURE — 83695 ASSAY OF LIPOPROTEIN(A): CPT

## 2022-03-02 PROCEDURE — 97116 GAIT TRAINING THERAPY: CPT

## 2022-03-02 PROCEDURE — 85025 COMPLETE CBC W/AUTO DIFF WBC: CPT

## 2022-03-02 PROCEDURE — 85384 FIBRINOGEN ACTIVITY: CPT

## 2022-03-02 PROCEDURE — 82947 ASSAY GLUCOSE BLOOD QUANT: CPT

## 2022-03-02 PROCEDURE — C1751: CPT

## 2022-03-02 PROCEDURE — 93880 EXTRACRANIAL BILAT STUDY: CPT

## 2022-03-02 PROCEDURE — 85610 PROTHROMBIN TIME: CPT

## 2022-03-02 PROCEDURE — 84132 ASSAY OF SERUM POTASSIUM: CPT

## 2022-03-02 PROCEDURE — 86900 BLOOD TYPING SEROLOGIC ABO: CPT

## 2022-03-02 PROCEDURE — 86901 BLOOD TYPING SEROLOGIC RH(D): CPT

## 2022-03-02 PROCEDURE — 83735 ASSAY OF MAGNESIUM: CPT

## 2022-03-02 PROCEDURE — P9045: CPT

## 2022-03-02 PROCEDURE — 36415 COLL VENOUS BLD VENIPUNCTURE: CPT

## 2022-03-02 PROCEDURE — 82565 ASSAY OF CREATININE: CPT

## 2022-03-02 PROCEDURE — 97110 THERAPEUTIC EXERCISES: CPT

## 2022-03-02 PROCEDURE — 85576 BLOOD PLATELET AGGREGATION: CPT

## 2022-03-02 RX ORDER — PANTOPRAZOLE SODIUM 20 MG/1
1 TABLET, DELAYED RELEASE ORAL
Qty: 14 | Refills: 0
Start: 2022-03-02 | End: 2022-03-15

## 2022-03-02 RX ORDER — CARVEDILOL PHOSPHATE 80 MG/1
1 CAPSULE, EXTENDED RELEASE ORAL
Qty: 0 | Refills: 0 | DISCHARGE

## 2022-03-02 RX ORDER — OXYCODONE HYDROCHLORIDE 5 MG/1
1 TABLET ORAL
Qty: 35 | Refills: 0
Start: 2022-03-02 | End: 2022-03-08

## 2022-03-02 RX ORDER — ASPIRIN/CALCIUM CARB/MAGNESIUM 324 MG
1 TABLET ORAL
Qty: 30 | Refills: 0
Start: 2022-03-02 | End: 2022-03-31

## 2022-03-02 RX ORDER — METOPROLOL TARTRATE 50 MG
1 TABLET ORAL
Qty: 60 | Refills: 0
Start: 2022-03-02 | End: 2022-03-31

## 2022-03-02 RX ORDER — BUDESONIDE, MICRONIZED 100 %
2 POWDER (GRAM) MISCELLANEOUS
Qty: 120 | Refills: 0
Start: 2022-03-02 | End: 2022-03-31

## 2022-03-02 RX ORDER — ASPIRIN/CALCIUM CARB/MAGNESIUM 324 MG
1 TABLET ORAL
Qty: 0 | Refills: 0 | DISCHARGE

## 2022-03-02 RX ORDER — BECLOMETHASONE DIPROPIONATE 40 UG/1
80 AEROSOL, METERED RESPIRATORY (INHALATION)
Qty: 2240 | Refills: 0
Start: 2022-03-02 | End: 2022-03-15

## 2022-03-02 RX ORDER — POLYETHYLENE GLYCOL 3350 17 G/17G
17 POWDER, FOR SOLUTION ORAL
Qty: 238 | Refills: 0
Start: 2022-03-02 | End: 2022-03-15

## 2022-03-02 RX ORDER — TAMSULOSIN HYDROCHLORIDE 0.4 MG/1
1 CAPSULE ORAL
Qty: 30 | Refills: 0
Start: 2022-03-02 | End: 2022-03-31

## 2022-03-02 RX ADMIN — Medication 100 MILLIGRAM(S): at 05:37

## 2022-03-02 RX ADMIN — Medication 81 MILLIGRAM(S): at 11:50

## 2022-03-02 RX ADMIN — GABAPENTIN 100 MILLIGRAM(S): 400 CAPSULE ORAL at 05:37

## 2022-03-02 RX ADMIN — Medication 1 TABLET(S): at 11:50

## 2022-03-02 RX ADMIN — Medication 20 MILLIGRAM(S): at 05:37

## 2022-03-02 RX ADMIN — TAMSULOSIN HYDROCHLORIDE 0.4 MILLIGRAM(S): 0.4 CAPSULE ORAL at 11:50

## 2022-03-02 RX ADMIN — ENOXAPARIN SODIUM 40 MILLIGRAM(S): 100 INJECTION SUBCUTANEOUS at 11:51

## 2022-03-02 RX ADMIN — PANTOPRAZOLE SODIUM 40 MILLIGRAM(S): 20 TABLET, DELAYED RELEASE ORAL at 05:38

## 2022-03-02 RX ADMIN — ESCITALOPRAM OXALATE 10 MILLIGRAM(S): 10 TABLET, FILM COATED ORAL at 11:52

## 2022-03-02 RX ADMIN — Medication 500 MILLIGRAM(S): at 05:37

## 2022-03-02 RX ADMIN — BUPROPION HYDROCHLORIDE 300 MILLIGRAM(S): 150 TABLET, EXTENDED RELEASE ORAL at 11:50

## 2022-03-02 RX ADMIN — Medication 0.5 MILLIGRAM(S): at 05:38

## 2022-03-02 NOTE — DISCHARGE NOTE PROVIDER - HOSPITAL COURSE
60 y/o Adventism M w/ PMH depression, HLD, HTN, CAD s/p stents (2015 Jan [facility], June [place]).   Presented to [facility] for CP/SOB, further workup output revealed stenosis of prior stents/ CAD  2/11   no chest pain   on coreq,  asa   ator  2/12 VSS: continue current medication asa/ statin/ bb; continue epogen and iron  2/13 VSS - rounds made w/ DR. Skaggs.  Dr. Aaron to see pt in am - no OR date as of yet  2/14 VSS; carotids neg sig stenosis; echo minimal MR/ neg AR ef 67%; continue asa/ statin/ bb; continue epogen   2/15 VSS Reports L chest "pressure" intermittently w/o radiation  several times daily  4/10  Await OR date- d/w DR. Aaron; continue to minimize blood work   2/16  Increase retacrit  3 x's weekly.  Rad artery sxac.  Urology  consult for h/o retention  2/17 VSS CP free Dr Goldberg to see patient today  2/18 Flomax for history retention  OR date to be determined  2/19 Painfree AM labs as per Dr Aaron  2/20 Painfree Await OR date  2/21     vs s  ambulating  HCT 44  2/22 VSS, awaiting OR date, continue optimization for surgery  2/23 VSS, scheduled for CABG Friday with Dr. Aaron, will check labs in AM.  2/24 NPO for CABG in am    2/25 s/p  CABGx2 (LIMA-LAD, SVG-OM)  EF 50%, no blood products  Post op splinting-pain management  extubated d 0  + pressors  Urology following for h/o retention, tov when nambulatory  2/28 Hawkins out , beta blockers titrated up  3/1 Transferred to sdu  3/1 LLL effusion vs atelectasis, US with small amt of fluid/ atelectasis.  The patient is requesting to be d/c home.  O 2 sat 94 with ambulation.  3/2 CXR improving , d/w Dr Aaron, d/c home today, f/u cxr with appointment next week

## 2022-03-02 NOTE — DISCHARGE NOTE PROVIDER - NSDCMRMEDTOKEN_GEN_ALL_CORE_FT
aspirin 81 mg oral delayed release tablet: 1 tab(s) orally once a day  atorvastatin 80 mg oral tablet: 1 tab(s) orally once a day  Lexapro 10 mg oral tablet: 1 tab(s) orally once a day  metoprolol succinate 100 mg oral tablet, extended release: 1 tab(s) orally 2 times a day  Multiple Vitamins oral tablet: 1 tab(s) orally once a day  oxyCODONE 5 mg oral tablet: 1 tab(s) orally every 4 hours, As needed, Moderate Pain (4 - 6) MDD:5  pantoprazole 40 mg oral delayed release tablet: 1 tab(s) orally once a day (before a meal)  polyethylene glycol 3350 oral powder for reconstitution: 17 gram(s) orally once a day  Pulmicort Flexhaler 180 mcg/inh inhalation powder: 2 puff(s) inhaled 2 times a day   tamsulosin 0.4 mg oral capsule: 1 cap(s) orally once a day  Wellbutrin  mg/24 hours oral tablet, extended release: 1 tab(s) orally every 24 hours   aspirin 81 mg oral delayed release tablet: 1 tab(s) orally once a day  atorvastatin 80 mg oral tablet: 1 tab(s) orally once a day  Lexapro 10 mg oral tablet: 1 tab(s) orally once a day  metoprolol succinate 100 mg oral tablet, extended release: 1 tab(s) orally 2 times a day  Multiple Vitamins oral tablet: 1 tab(s) orally once a day  oxyCODONE 5 mg oral tablet: 1 tab(s) orally every 4 hours, As needed, Moderate Pain (4 - 6) MDD:5  pantoprazole 40 mg oral delayed release tablet: 1 tab(s) orally once a day (before a meal)  polyethylene glycol 3350 oral powder for reconstitution: 17 gram(s) orally once a day  Qvar Redihaler 80 mcg/inh inhalation aerosol: 80 microgram(s) inhaled 2 times a day   tamsulosin 0.4 mg oral capsule: 1 cap(s) orally once a day  Wellbutrin  mg/24 hours oral tablet, extended release: 1 tab(s) orally every 24 hours

## 2022-03-02 NOTE — DISCHARGE NOTE PROVIDER - PROVIDER TOKENS
PROVIDER:[TOKEN:[[id number]],SCHEDULEDAPPT:[03/09/2022],SCHEDULEDAPPTTIME:[02:00 PM]],PROVIDER:[TOKEN:[[id number]],FOLLOWUP:[2 weeks]]

## 2022-03-02 NOTE — DISCHARGE NOTE PROVIDER - NSDCACTIVITY_GEN_ALL_CORE
Sex allowed/Showering allowed/Stairs allowed/Walking - Indoors allowed/No heavy lifting/straining/Walking - Outdoors allowed/Follow Instructions Provided by your Surgical Team

## 2022-03-02 NOTE — DISCHARGE NOTE PROVIDER - CARE PROVIDER_API CALL
Kang Aaron)  Surgery; Surgical Critical Care; Thoracic and Cardiac Surgery  [address] [place] [address]  Phone: [phone]  Fax: [phone]  Scheduled Appointment: 03/09/2022 02:00 PM    Pablo Valladares)  Cardiology  [address] [place] [id number]  Phone: [phone]  Fax: [phone]  Follow Up Time: 2 weeks

## 2022-03-02 NOTE — DISCHARGE NOTE PROVIDER - NSDCFUADDAPPT_GEN_ALL_CORE_FT
YOU HAVE AN APPOINTMENT ON  3/9/22 AT 2PM WITH DR DUFFY.  PLEASE  ARRIVE AT 1 PM FOR A CXR PRIOR TO YOUR VISIT    CALL YOUR CARDIOLOGIST AND SEE HIM IN 3 WEEKS

## 2022-03-02 NOTE — DISCHARGE NOTE PROVIDER - NSDCPNSUBOBJ_GEN_ALL_CORE
VITAL SIGNS    Telemetry:  nsr 80    Vital Signs Last 24 Hrs  T(C): 36.6 (22 @ 07:09), Max: 36.9 (22 @ 23:08)  T(F): 97.9 (22 @ 07:09), Max: 98.5 (22 @ 23:08)  HR: 88 (22 @ 09:18) (82 - 99)  BP: 107/67 (22 @ 09:18) (107/67 - 132/74)  RR: 18 (22 @ 09:18) (18 - 20)  SpO2: 94% (22 @ 09:18) (89% - 97%)                    @ 07:01  -   @ 07:00  --------------------------------------------------------  IN: 720 mL / OUT: 950 mL / NET: -230 mL     @ 07:01  -   @ 09:41  --------------------------------------------------------  IN: 120 mL / OUT: 0 mL / NET: 120 mL          Daily     Daily Weight in k.7 (02 Mar 2022 06:50)            CAPILLARY BLOOD GLUCOSE      POCT Blood Glucose.: 104 mg/dL (02 Mar 2022 07:23)  POCT Blood Glucose.: 100 mg/dL (01 Mar 2022 21:21)  POCT Blood Glucose.: 101 mg/dL (01 Mar 2022 16:37)  POCT Blood Glucose.: 106 mg/dL (01 Mar 2022 11:29)            Drains:         Pacing Wires          Coumadin    [ ] YES          [x ]      NO                                   PHYSICAL EXAM        Neurology: alert and oriented x 3, nonfocal, no gross deficits  CV : s1 s2 RRR  Sternal Wound :  CDI , Stable  Lungs: cta  Abdomen: soft, nontender, nondistended, positive bowel sounds, last bowel movement +                 :    voiding   Extremities:    - edema   /  -   calve tenderness ,    R leg  incisions cdi          acetaminophen     Tablet .. 650 milliGRAM(s) Oral every 6 hours PRN  aspirin enteric coated 81 milliGRAM(s) Oral daily  atorvastatin 80 milliGRAM(s) Oral daily  bisacodyl Suppository 10 milliGRAM(s) Rectal once  buDESOnide    Inhalation Suspension 0.5 milliGRAM(s) Inhalation two times a day  buPROPion XL (24-Hour) . 300 milliGRAM(s) Oral daily  dextrose 50% Injectable 50 milliLiter(s) IV Push every 15 minutes  enoxaparin Injectable 40 milliGRAM(s) SubCutaneous daily  escitalopram 10 milliGRAM(s) Oral daily  furosemide    Tablet 20 milliGRAM(s) Oral daily  HYDROmorphone  Injectable 0.5 milliGRAM(s) IV Push every 6 hours PRN  insulin lispro (ADMELOG) corrective regimen sliding scale   SubCutaneous Before meals and at bedtime  metoprolol succinate  milliGRAM(s) Oral two times a day  multivitamin 1 Tablet(s) Oral daily  oxyCODONE    IR 5 milliGRAM(s) Oral every 4 hours PRN  oxyCODONE    IR 10 milliGRAM(s) Oral every 4 hours PRN  pantoprazole    Tablet 40 milliGRAM(s) Oral before breakfast  polyethylene glycol 3350 17 Gram(s) Oral daily  senna 2 Tablet(s) Oral at bedtime  simethicone 80 milliGRAM(s) Chew every 8 hours PRN  tamsulosin 0.4 milliGRAM(s) Oral daily                    Physical Therapy Rec:   Home  [ x]   Home w/ PT  [  ]  Rehab  [  ]  Discussed with Cardiothoracic Team at AM rounds.

## 2022-03-02 NOTE — DISCHARGE NOTE PROVIDER - CARE PROVIDERS DIRECT ADDRESSES
[email or web address]

## 2022-03-03 ENCOUNTER — NON-APPOINTMENT (OUTPATIENT)
Age: 62
End: 2022-03-03

## 2022-03-03 RX ORDER — CARVEDILOL 3.12 MG/1
3.12 TABLET, FILM COATED ORAL
Qty: 180 | Refills: 1 | Status: DISCONTINUED | COMMUNITY
Start: 2021-03-03 | End: 2022-03-03

## 2022-03-03 RX ORDER — ASPIRIN 325 MG/1
325 TABLET, FILM COATED ORAL DAILY
Refills: 0 | Status: DISCONTINUED | COMMUNITY
End: 2022-03-03

## 2022-03-03 RX ORDER — ASPIRIN ENTERIC COATED TABLETS 81 MG 81 MG/1
81 TABLET, DELAYED RELEASE ORAL
Refills: 0 | Status: ACTIVE | COMMUNITY

## 2022-03-04 ENCOUNTER — APPOINTMENT (OUTPATIENT)
Dept: CARE COORDINATION | Facility: HOME HEALTH | Age: 62
End: 2022-03-04
Payer: COMMERCIAL

## 2022-03-04 VITALS
BODY MASS INDEX: 30.54 KG/M2 | OXYGEN SATURATION: 98 % | DIASTOLIC BLOOD PRESSURE: 68 MMHG | HEART RATE: 82 BPM | HEIGHT: 72 IN | RESPIRATION RATE: 16 BRPM | SYSTOLIC BLOOD PRESSURE: 102 MMHG | WEIGHT: 225.5 LBS

## 2022-03-04 PROCEDURE — 99024 POSTOP FOLLOW-UP VISIT: CPT

## 2022-03-04 NOTE — PHYSICAL EXAM
[Sclera] : the sclera and conjunctiva were normal [Neck Appearance] : the appearance of the neck was normal [Respiration, Rhythm And Depth] : normal respiratory rhythm and effort [Exaggerated Use Of Accessory Muscles For Inspiration] : no accessory muscle use [Auscultation Breath Sounds / Voice Sounds] : lungs were clear to auscultation bilaterally [Apical Impulse] : the apical impulse was normal [Heart Rate And Rhythm] : heart rate was normal and rhythm regular [Heart Sounds] : normal S1 and S2 [Murmurs] : no murmurs [Heart Sounds Pericardial Friction Rub] : no pericardial rub [Chest Visual Inspection Thoracic Asymmetry] : no chest asymmetry [1+] : left 1+ [Bowel Sounds] : normal bowel sounds [Abdomen Soft] : soft [Abdomen Tenderness] : non-tender [Abnormal Walk] : normal gait [Skin Color & Pigmentation] : normal skin color and pigmentation [Musculoskeletal - Swelling] : no joint swelling seen [Skin Turgor] : normal skin turgor [] : no rash [Oriented To Time, Place, And Person] : oriented to person, place, and time [Impaired Insight] : insight and judgment were intact [Affect] : the affect was normal [FreeTextEntry2] : B/L LE without edema, B/L calves soft, NT [FreeTextEntry1] : RL SVG harvest site without erythema, warmth or drainage, edges remain well approximated

## 2022-03-04 NOTE — ASSESSMENT
[FreeTextEntry1] : Pt recovering well at home s/p OHS. Reviewed all medications and dosages with pt understanding. Pt has all medications in home and is taking as prescribed. Pain controlled with current medication regimen. Pt remains with SOB with activity. During visit no difficulty at rest, slight EPPERSON with ascending several stairs twice, otherwise pt breathing appears to be at baseline from post op. Labs reviewed and WNL. Encouraged IS, pt at 2000 ml without difficulty. Pt to continue ambulation as tolerated. Worsening symptoms reviewed. No further new symptoms, issues or concerns to report at this time.\par

## 2022-03-04 NOTE — HISTORY OF PRESENT ILLNESS
[FreeTextEntry1] : 60 y/o [employer] M w/ PMH depression, HLD, HTN, CAD s/p stents (2015 \par Jan [facility], June [facility]). Presented to [facility] for CP/SOB, further workup output revealed stenosis of \par prior stents/ CAD, pt underwent CABG with Dr. Aaron 2/25/22 (LIMA-LAD, SVG-OM) \par EF 50%, no blood products, Urology following for h/o retention, LLL with small effusion vs atelectasis. Pt remained hemodynamically stable and discharged home with support of spouse/family, home care services and the [facility] team. Initial visit completed in home\par CC "I'm feeling pretty good, just still SOB"\par

## 2022-03-04 NOTE — REASON FOR VISIT
[Follow-Up: _____] : a [unfilled] follow-up visit [FreeTextEntry1] : FOLLOW YOUR HEART- Transitional Care Management Program- [facility]\par \par

## 2022-03-04 NOTE — REVIEW OF SYSTEMS
[SOB on Exertion] : shortness of breath during exertion [Shortness Of Breath] : no shortness of breath [Wheezing] : no wheezing [Cough] : no cough [Orthopnea] : no orthopnea [PND] : no PND [Negative] : Psychiatric [FreeTextEntry7] : last BM today

## 2022-03-09 ENCOUNTER — APPOINTMENT (OUTPATIENT)
Dept: CARDIOTHORACIC SURGERY | Facility: CLINIC | Age: 62
End: 2022-03-09
Payer: COMMERCIAL

## 2022-03-09 ENCOUNTER — OUTPATIENT (OUTPATIENT)
Dept: OUTPATIENT SERVICES | Facility: HOSPITAL | Age: 62
LOS: 1 days | End: 2022-03-09
Payer: COMMERCIAL

## 2022-03-09 VITALS — BODY MASS INDEX: 30.48 KG/M2 | HEIGHT: 72 IN | WEIGHT: 225 LBS

## 2022-03-09 VITALS
DIASTOLIC BLOOD PRESSURE: 70 MMHG | OXYGEN SATURATION: 97 % | SYSTOLIC BLOOD PRESSURE: 106 MMHG | HEART RATE: 80 BPM | RESPIRATION RATE: 14 BRPM | TEMPERATURE: 97.3 F

## 2022-03-09 DIAGNOSIS — J98.11 ATELECTASIS: ICD-10-CM

## 2022-03-09 DIAGNOSIS — Z90.49 ACQUIRED ABSENCE OF OTHER SPECIFIED PARTS OF DIGESTIVE TRACT: Chronic | ICD-10-CM

## 2022-03-09 DIAGNOSIS — Z98.890 OTHER SPECIFIED POSTPROCEDURAL STATES: Chronic | ICD-10-CM

## 2022-03-09 DIAGNOSIS — Z95.1 PRESENCE OF AORTOCORONARY BYPASS GRAFT: ICD-10-CM

## 2022-03-09 PROCEDURE — 99024 POSTOP FOLLOW-UP VISIT: CPT

## 2022-03-09 PROCEDURE — 71046 X-RAY EXAM CHEST 2 VIEWS: CPT | Mod: 26

## 2022-03-09 PROCEDURE — 71046 X-RAY EXAM CHEST 2 VIEWS: CPT

## 2022-03-10 PROBLEM — J98.11 ATELECTASIS: Status: ACTIVE | Noted: 2022-03-10

## 2022-03-16 ENCOUNTER — NON-APPOINTMENT (OUTPATIENT)
Age: 62
End: 2022-03-16

## 2022-03-18 ENCOUNTER — APPOINTMENT (OUTPATIENT)
Dept: CARDIOTHORACIC SURGERY | Facility: CLINIC | Age: 62
End: 2022-03-18
Payer: COMMERCIAL

## 2022-03-18 ENCOUNTER — APPOINTMENT (OUTPATIENT)
Dept: CARDIOTHORACIC SURGERY | Facility: CLINIC | Age: 62
End: 2022-03-18

## 2022-03-18 ENCOUNTER — NON-APPOINTMENT (OUTPATIENT)
Age: 62
End: 2022-03-18

## 2022-03-18 VITALS
HEIGHT: 72 IN | HEART RATE: 55 BPM | SYSTOLIC BLOOD PRESSURE: 102 MMHG | RESPIRATION RATE: 16 BRPM | DIASTOLIC BLOOD PRESSURE: 64 MMHG | OXYGEN SATURATION: 97 % | TEMPERATURE: 97.5 F

## 2022-03-18 VITALS — BODY MASS INDEX: 30.11 KG/M2 | WEIGHT: 222 LBS

## 2022-03-18 PROCEDURE — 97802 MEDICAL NUTRITION INDIV IN: CPT

## 2022-03-22 ENCOUNTER — NON-APPOINTMENT (OUTPATIENT)
Age: 62
End: 2022-03-22

## 2022-03-28 ENCOUNTER — APPOINTMENT (OUTPATIENT)
Dept: CARDIOLOGY | Facility: CLINIC | Age: 62
End: 2022-03-28
Payer: COMMERCIAL

## 2022-03-28 VITALS
DIASTOLIC BLOOD PRESSURE: 70 MMHG | SYSTOLIC BLOOD PRESSURE: 110 MMHG | BODY MASS INDEX: 30.79 KG/M2 | WEIGHT: 227 LBS | OXYGEN SATURATION: 95 % | HEART RATE: 71 BPM | TEMPERATURE: 97.8 F

## 2022-03-28 PROCEDURE — 99214 OFFICE O/P EST MOD 30 MIN: CPT

## 2022-03-31 ENCOUNTER — TRANSCRIPTION ENCOUNTER (OUTPATIENT)
Age: 62
End: 2022-03-31

## 2022-04-01 ENCOUNTER — NON-APPOINTMENT (OUTPATIENT)
Age: 62
End: 2022-04-01

## 2022-04-08 ENCOUNTER — APPOINTMENT (OUTPATIENT)
Dept: ULTRASOUND IMAGING | Facility: CLINIC | Age: 62
End: 2022-04-08
Payer: COMMERCIAL

## 2022-04-08 ENCOUNTER — APPOINTMENT (OUTPATIENT)
Dept: CARDIOLOGY | Facility: CLINIC | Age: 62
End: 2022-04-08

## 2022-04-08 ENCOUNTER — RESULT REVIEW (OUTPATIENT)
Age: 62
End: 2022-04-08

## 2022-04-08 PROCEDURE — 93971 EXTREMITY STUDY: CPT | Mod: RT

## 2022-04-28 ENCOUNTER — RX RENEWAL (OUTPATIENT)
Age: 62
End: 2022-04-28

## 2022-05-06 ENCOUNTER — APPOINTMENT (OUTPATIENT)
Dept: CARDIOLOGY | Facility: CLINIC | Age: 62
End: 2022-05-06
Payer: COMMERCIAL

## 2022-05-06 VITALS
WEIGHT: 224 LBS | DIASTOLIC BLOOD PRESSURE: 60 MMHG | HEIGHT: 72 IN | HEART RATE: 67 BPM | TEMPERATURE: 97.1 F | SYSTOLIC BLOOD PRESSURE: 100 MMHG | OXYGEN SATURATION: 96 % | BODY MASS INDEX: 30.34 KG/M2

## 2022-05-06 PROCEDURE — 99214 OFFICE O/P EST MOD 30 MIN: CPT

## 2022-05-06 RX ORDER — OXYCODONE 5 MG/1
5 TABLET ORAL
Qty: 10 | Refills: 0 | Status: DISCONTINUED | COMMUNITY
End: 2022-05-06

## 2022-05-06 RX ORDER — TAMSULOSIN HYDROCHLORIDE 0.4 MG/1
0.4 CAPSULE ORAL
Refills: 0 | Status: DISCONTINUED | COMMUNITY
End: 2022-05-06

## 2022-05-13 ENCOUNTER — NON-APPOINTMENT (OUTPATIENT)
Age: 62
End: 2022-05-13

## 2022-05-24 ENCOUNTER — NON-APPOINTMENT (OUTPATIENT)
Age: 62
End: 2022-05-24

## 2022-08-05 ENCOUNTER — APPOINTMENT (OUTPATIENT)
Dept: CARDIOLOGY | Facility: CLINIC | Age: 62
End: 2022-08-05

## 2022-08-05 VITALS
HEIGHT: 72 IN | HEART RATE: 77 BPM | BODY MASS INDEX: 30.07 KG/M2 | WEIGHT: 222 LBS | DIASTOLIC BLOOD PRESSURE: 68 MMHG | SYSTOLIC BLOOD PRESSURE: 98 MMHG | OXYGEN SATURATION: 96 % | TEMPERATURE: 96.8 F

## 2022-08-05 DIAGNOSIS — F41.9 ANXIETY DISORDER, UNSPECIFIED: ICD-10-CM

## 2022-08-05 PROCEDURE — 99214 OFFICE O/P EST MOD 30 MIN: CPT

## 2022-08-05 NOTE — DISCUSSION/SUMMARY
[FreeTextEntry1] : 61M w/ PMH as above presenting for routine follow up ; history of CABG performed for unstable angina.  \par \par 1. CAD -status post CABG, currently CCS class 0, continue aspirin 81 mg p.o. daily.\par 2. HLD - LDL  Continue atorvastatin 80 mg PO Daily.  Start fish oil 3 to 4 g/day.  Start with generic brand and if it does not reduce triglycerides significantly, he will need Vascepa.\par 3. HTN -decrease Toprol-XL to 50 mg p.o. daily\par 4.  Cardiac rehab referral given-we will probably have to go to [facility]\par 5.  Elevated triglycerides.  Fish oil as above.  Reduction of carbs and abdominal girth.  This will also help borderline or prediabetes with A1c of 5.8.\par \par RTC 6 months

## 2022-08-05 NOTE — HISTORY OF PRESENT ILLNESS
[FreeTextEntry1] : 61M w/ PMH of HTN, HLD, CAD s/p MI and subsequent PCI presenting for routine follow up.  Overall remains active at work and has no lifestyle limiting angina or SOB.  With activity or rest, no episodes of chest discomfort. \par \par Compliant with medications. \par \par Since our last visit he has been feeling well from a cardiovascular perspective.  He tries to remain physically active.  Unfortunately, [facility] does not take his insurance for cardiac rehab.  He is trying to get rehab approved at [facility] by the insurance company.\par \par Labs from April 2022 were reviewed.  , LDL 88, A1c 5.8.

## 2022-08-05 NOTE — CARDIOLOGY SUMMARY
[de-identified] : 4/30/2021: Sinus rhythm, inferior infarct\par 1/28/22: SR, inferior wall MI and poor r wave progression [de-identified] : 5/16/2019: Exercise nuclear, fixed inferior defect in the inferior wall suggestive infarct. [de-identified] : 10/20/2020: Ejection fraction 60%, no segmental wall motion abnormalities [de-identified] : 2/25/2022: [facility], CABG x2-LIMA to LAD and SVG to OM.

## 2022-08-05 NOTE — PHYSICAL EXAM
[Well Developed] : well developed [Well Nourished] : well nourished [No Acute Distress] : no acute distress [Normal Conjunctiva] : normal conjunctiva [Normal Venous Pressure] : normal venous pressure [No Carotid Bruit] : no carotid bruit [Normal S1, S2] : normal S1, S2 [No Murmur] : no murmur [No Rub] : no rub [No Gallop] : no gallop [Clear Lung Fields] : clear lung fields [Good Air Entry] : good air entry [No Respiratory Distress] : no respiratory distress  [Soft] : abdomen soft [Non Tender] : non-tender [Normal Gait] : normal gait [No Edema] : no edema [No Cyanosis] : no cyanosis [No Clubbing] : no clubbing [No Varicosities] : no varicosities [No Rash] : no rash [No Skin Lesions] : no skin lesions [Moves all extremities] : moves all extremities [No Focal Deficits] : no focal deficits [Normal Speech] : normal speech [Alert and Oriented] : alert and oriented [Normal memory] : normal memory [de-identified] : Well-healed midline sternotomy.  Right lower extremity saphenous vein site appears to be healing well.  Seroma healing by secondary intention

## 2023-02-03 ENCOUNTER — APPOINTMENT (OUTPATIENT)
Dept: CARDIOLOGY | Facility: CLINIC | Age: 63
End: 2023-02-03

## 2023-04-03 ENCOUNTER — APPOINTMENT (OUTPATIENT)
Dept: CARDIOLOGY | Facility: CLINIC | Age: 63
End: 2023-04-03
Payer: COMMERCIAL

## 2023-04-03 ENCOUNTER — NON-APPOINTMENT (OUTPATIENT)
Age: 63
End: 2023-04-03

## 2023-04-03 VITALS
SYSTOLIC BLOOD PRESSURE: 120 MMHG | HEIGHT: 72 IN | OXYGEN SATURATION: 95 % | DIASTOLIC BLOOD PRESSURE: 70 MMHG | HEART RATE: 58 BPM | BODY MASS INDEX: 29.53 KG/M2 | WEIGHT: 218 LBS

## 2023-04-03 PROCEDURE — 93000 ELECTROCARDIOGRAM COMPLETE: CPT

## 2023-04-03 PROCEDURE — 99214 OFFICE O/P EST MOD 30 MIN: CPT | Mod: 25

## 2023-10-02 ENCOUNTER — NON-APPOINTMENT (OUTPATIENT)
Age: 63
End: 2023-10-02

## 2023-10-02 ENCOUNTER — APPOINTMENT (OUTPATIENT)
Dept: CARDIOLOGY | Facility: CLINIC | Age: 63
End: 2023-10-02
Payer: COMMERCIAL

## 2023-10-02 VITALS
OXYGEN SATURATION: 99 % | SYSTOLIC BLOOD PRESSURE: 114 MMHG | BODY MASS INDEX: 27.63 KG/M2 | HEIGHT: 72 IN | WEIGHT: 204 LBS | DIASTOLIC BLOOD PRESSURE: 82 MMHG | HEART RATE: 75 BPM

## 2023-10-02 DIAGNOSIS — R07.89 OTHER CHEST PAIN: ICD-10-CM

## 2023-10-02 PROCEDURE — 99214 OFFICE O/P EST MOD 30 MIN: CPT | Mod: 25

## 2023-10-02 PROCEDURE — 93000 ELECTROCARDIOGRAM COMPLETE: CPT

## 2023-10-02 RX ORDER — OFLOXACIN 3 MG/ML
0.3 SOLUTION/ DROPS OPHTHALMIC
Qty: 10 | Refills: 0 | Status: DISCONTINUED | COMMUNITY
Start: 2023-03-14 | End: 2023-10-02

## 2023-10-12 ENCOUNTER — RX RENEWAL (OUTPATIENT)
Age: 63
End: 2023-10-12

## 2023-10-13 RX ORDER — ATORVASTATIN CALCIUM 80 MG/1
80 TABLET, FILM COATED ORAL
Qty: 90 | Refills: 3 | Status: ACTIVE | COMMUNITY
Start: 2018-05-04 | End: 1900-01-01

## 2024-02-21 ENCOUNTER — OFFICE (OUTPATIENT)
Dept: URBAN - METROPOLITAN AREA CLINIC 38 | Facility: CLINIC | Age: 64
Setting detail: OPHTHALMOLOGY
End: 2024-02-21
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
## 2024-02-21 PROCEDURE — NO SHOW FE NO SHOW FEE: Performed by: OPHTHALMOLOGY

## 2024-05-03 ENCOUNTER — APPOINTMENT (OUTPATIENT)
Dept: CARDIOLOGY | Facility: CLINIC | Age: 64
End: 2024-05-03

## 2024-06-26 ENCOUNTER — RX ONLY (RX ONLY)
Age: 64
End: 2024-06-26

## 2024-06-26 ENCOUNTER — OFFICE (OUTPATIENT)
Dept: URBAN - METROPOLITAN AREA CLINIC 38 | Facility: CLINIC | Age: 64
Setting detail: OPHTHALMOLOGY
End: 2024-06-26
Payer: MEDICAID

## 2024-06-26 DIAGNOSIS — H52.4: ICD-10-CM

## 2024-06-26 DIAGNOSIS — H25.13: ICD-10-CM

## 2024-06-26 DIAGNOSIS — H40.013: ICD-10-CM

## 2024-06-26 DIAGNOSIS — H04.123: ICD-10-CM

## 2024-06-26 DIAGNOSIS — G43.109: ICD-10-CM

## 2024-06-26 PROCEDURE — 92014 COMPRE OPH EXAM EST PT 1/>: CPT | Performed by: OPHTHALMOLOGY

## 2024-06-26 PROCEDURE — 92133 CPTRZD OPH DX IMG PST SGM ON: CPT | Performed by: OPHTHALMOLOGY

## 2024-06-26 PROCEDURE — 92015 DETERMINE REFRACTIVE STATE: CPT | Performed by: OPHTHALMOLOGY

## 2024-07-02 ENCOUNTER — NON-APPOINTMENT (OUTPATIENT)
Age: 64
End: 2024-07-02

## 2024-07-02 ENCOUNTER — APPOINTMENT (OUTPATIENT)
Dept: CARDIOLOGY | Facility: CLINIC | Age: 64
End: 2024-07-02
Payer: COMMERCIAL

## 2024-07-02 VITALS
HEART RATE: 54 BPM | BODY MASS INDEX: 27.09 KG/M2 | DIASTOLIC BLOOD PRESSURE: 58 MMHG | OXYGEN SATURATION: 99 % | WEIGHT: 200 LBS | SYSTOLIC BLOOD PRESSURE: 92 MMHG | HEIGHT: 72 IN

## 2024-07-02 DIAGNOSIS — E78.00 PURE HYPERCHOLESTEROLEMIA, UNSPECIFIED: ICD-10-CM

## 2024-07-02 DIAGNOSIS — Z95.1 PRESENCE OF AORTOCORONARY BYPASS GRAFT: ICD-10-CM

## 2024-07-02 DIAGNOSIS — I25.10 ATHEROSCLEROTIC HEART DISEASE OF NATIVE CORONARY ARTERY W/OUT ANGINA PECTORIS: ICD-10-CM

## 2024-07-02 PROCEDURE — G2211 COMPLEX E/M VISIT ADD ON: CPT

## 2024-07-02 PROCEDURE — 93000 ELECTROCARDIOGRAM COMPLETE: CPT

## 2024-07-02 PROCEDURE — 99214 OFFICE O/P EST MOD 30 MIN: CPT

## 2024-07-26 ENCOUNTER — NON-APPOINTMENT (OUTPATIENT)
Age: 64
End: 2024-07-26

## 2024-10-19 ENCOUNTER — RX RENEWAL (OUTPATIENT)
Age: 64
End: 2024-10-19

## 2024-10-21 NOTE — PROGRESS NOTE ADULT - PROBLEM SELECTOR PLAN 1
"Patient: Gilbert Phillips    Procedure Summary     Date: 11/22/21 Room / Location:  [facility] /  [facility]    Anesthesia Start: 0820 Anesthesia Stop: 0850    Procedure: COLONOSCOPY INTO CECUM WITH COLD BIOPSY POLYPECTOMY (N/A ) Diagnosis:       Personal history of colonic polyps      (Personal history of colonic polyps [Z86.010])    Surgeons: Daniel Taylor MD Provider: Carlos Alberto Michaels MD    Anesthesia Type: MAC ASA Status: 2          Anesthesia Type: MAC    Vitals  No vitals data found for the desired time range.          Post Anesthesia Care and Evaluation    Patient location during evaluation: bedside  Patient participation: complete - patient participated  Level of consciousness: awake  Pain score: 2  Pain management: adequate  Airway patency: patent  Anesthetic complications: No anesthetic complications  PONV Status: none  Cardiovascular status: acceptable  Respiratory status: acceptable  Hydration status: acceptable    Comments: /83 (BP Location: Left arm, Patient Position: Lying)   Pulse 71   Temp 36.4 °C (97.5 °F) (Oral)   Resp 16   Ht 170.2 cm (67\")   Wt 74.4 kg (164 lb)   SpO2 95%   BMI 25.69 kg/m²         " continue preop work up   continue asa 325/ Lopressor 25 q8/ Atorvastatin 80  shower qd  continue iron/ folate/ vit c/ epogen week  limited blood draws- pt jehovah witness  2/20 Hematocrit 44.2 Hemoglobin 14.6  Plan for CABG Friday 2/25 with Dr. Aaron No

## 2024-12-14 LAB — HBA1C MFR BLD HPLC: 6.3

## 2024-12-16 ENCOUNTER — APPOINTMENT (OUTPATIENT)
Dept: CARDIOLOGY | Facility: CLINIC | Age: 64
End: 2024-12-16
Payer: COMMERCIAL

## 2024-12-16 ENCOUNTER — NON-APPOINTMENT (OUTPATIENT)
Age: 64
End: 2024-12-16

## 2024-12-16 VITALS
HEART RATE: 55 BPM | BODY MASS INDEX: 27.9 KG/M2 | OXYGEN SATURATION: 97 % | HEIGHT: 72 IN | WEIGHT: 206 LBS | SYSTOLIC BLOOD PRESSURE: 118 MMHG | DIASTOLIC BLOOD PRESSURE: 66 MMHG

## 2024-12-16 DIAGNOSIS — E78.00 PURE HYPERCHOLESTEROLEMIA, UNSPECIFIED: ICD-10-CM

## 2024-12-16 DIAGNOSIS — Z87.898 PERSONAL HISTORY OF OTHER SPECIFIED CONDITIONS: ICD-10-CM

## 2024-12-16 DIAGNOSIS — Z95.1 PRESENCE OF AORTOCORONARY BYPASS GRAFT: ICD-10-CM

## 2024-12-16 DIAGNOSIS — I25.10 ATHEROSCLEROTIC HEART DISEASE OF NATIVE CORONARY ARTERY W/OUT ANGINA PECTORIS: ICD-10-CM

## 2024-12-16 PROCEDURE — G2211 COMPLEX E/M VISIT ADD ON: CPT

## 2024-12-16 PROCEDURE — 99215 OFFICE O/P EST HI 40 MIN: CPT

## 2025-01-22 ENCOUNTER — APPOINTMENT (OUTPATIENT)
Dept: CARDIOLOGY | Facility: CLINIC | Age: 65
End: 2025-01-22

## 2025-01-22 PROCEDURE — 78452 HT MUSCLE IMAGE SPECT MULT: CPT

## 2025-01-22 PROCEDURE — A9502: CPT | Mod: JZ

## 2025-01-22 PROCEDURE — 93015 CV STRESS TEST SUPVJ I&R: CPT

## 2025-02-04 ENCOUNTER — APPOINTMENT (OUTPATIENT)
Dept: CARDIOLOGY | Facility: CLINIC | Age: 65
End: 2025-02-04

## 2025-07-09 ENCOUNTER — APPOINTMENT (OUTPATIENT)
Dept: CARDIOLOGY | Facility: CLINIC | Age: 65
End: 2025-07-09
Payer: COMMERCIAL

## 2025-07-09 PROCEDURE — 93306 TTE W/DOPPLER COMPLETE: CPT

## 2025-07-10 ENCOUNTER — OFFICE (OUTPATIENT)
Dept: URBAN - METROPOLITAN AREA CLINIC 38 | Facility: CLINIC | Age: 65
Setting detail: OPHTHALMOLOGY
End: 2025-07-10
Payer: COMMERCIAL

## 2025-07-10 DIAGNOSIS — H25.13: ICD-10-CM

## 2025-07-10 DIAGNOSIS — H16.223: ICD-10-CM

## 2025-07-10 DIAGNOSIS — H43.813: ICD-10-CM

## 2025-07-10 DIAGNOSIS — H40.013: ICD-10-CM

## 2025-07-10 PROCEDURE — 92133 CPTRZD OPH DX IMG PST SGM ON: CPT | Performed by: OPTOMETRIST

## 2025-07-10 PROCEDURE — 92014 COMPRE OPH EXAM EST PT 1/>: CPT | Performed by: OPTOMETRIST

## 2025-07-10 ASSESSMENT — CONFRONTATIONAL VISUAL FIELD TEST (CVF)
OD_FINDINGS: FULL
OS_FINDINGS: FULL

## 2025-07-10 ASSESSMENT — REFRACTION_AUTOREFRACTION
OD_SPHERE: +1.00
OD_AXIS: 004
OD_CYLINDER: -0.50
OS_CYLINDER: SPHERE
OS_SPHERE: +1.00

## 2025-07-10 ASSESSMENT — REFRACTION_MANIFEST
OS_VA1: 20/20
OS_CYLINDER: +0.25
OU_VA: 20/20
OD_VA2: 20/20(J1+)
OD_VA2: 20/20(J1+)
OD_ADD: +2.75
OS_VA2: 20/20(J1+)
OD_CYLINDER: SPH
OS_AXIS: 130
OD_VA1: 20/20
OD_SPHERE: +1.00
OD_CYLINDER: -0.50
OS_SPHERE: +1.00
OD_VA1: 20/25
OD_SPHERE: +0.75
OS_VA2: 20/20(J1+)
OS_CYLINDER: SPHERE
OS_ADD: +2.75
OD_ADD: +3.00
OS_SPHERE: +0.75
OS_VA1: 20/25
OU_VA: 20/20
OS_ADD: +3.00
OD_AXIS: 004

## 2025-07-10 ASSESSMENT — TEAR BREAK UP TIME (TBUT)
OS_TBUT: 2+
OD_TBUT: 2+

## 2025-07-10 ASSESSMENT — KERATOMETRY
OD_K1POWER_DIOPTERS: 41.75
OD_AXISANGLE_DEGREES: 099
OD_K2POWER_DIOPTERS: 43.00
OS_AXISANGLE_DEGREES: 094
OS_K1POWER_DIOPTERS: 40.50
OS_K2POWER_DIOPTERS: 41.25

## 2025-07-10 ASSESSMENT — VISUAL ACUITY
OS_BCVA: 20/25
OD_BCVA: 20/40

## (undated) DEVICE — PHRENIC NERVE PAD MEDIUM

## (undated) DEVICE — SYR LUER LOK 3CC

## (undated) DEVICE — GLV 7.5 PROTEXIS (WHITE)

## (undated) DEVICE — CONNECTOR INTERSEPT "Y" 1/4 X 1/4 X 1/4"

## (undated) DEVICE — NDL BLUNT 18G LOOP VESSEL MAXI WHITE

## (undated) DEVICE — SUT PROLENE 4-0 36" RB-1

## (undated) DEVICE — SUT PROLENE 7-0 24" BV175-8 MULTIPASS

## (undated) DEVICE — MARKING PEN W RULER

## (undated) DEVICE — GETINGE VASOVIEW 7 ENDOSCOPIC VESSEL HARVESTING SYSTEM

## (undated) DEVICE — STAPLER SKIN VISI-STAT 35 WIDE

## (undated) DEVICE — DRSG STERISTRIPS 0.5 X 4"

## (undated) DEVICE — DRSG TEGADERM 6"X8"

## (undated) DEVICE — BULLDOG SPRING CLIP 6MM SOFT/SOFT

## (undated) DEVICE — NDL COUNTER FOAM AND MAGNET 40-70

## (undated) DEVICE — SUT PROLENE 4-0 36" BB

## (undated) DEVICE — WARMING BLANKET DUO-THERM HYPER/HYPOTHERM ADULT

## (undated) DEVICE — SUT SOFSILK 0 18" TIES

## (undated) DEVICE — MULTIPLE PERFUSION SET FEMALE 1 INLET LEG W 4 LEGS 15" (BLUE/RED)

## (undated) DEVICE — DRSG COBAN 6"

## (undated) DEVICE — DRSG DERMABOND PRINEO 60CM

## (undated) DEVICE — SUT BIOSYN 4-0 18" P-12

## (undated) DEVICE — GLV 7 PROTEXIS (WHITE)

## (undated) DEVICE — DRSG ACE BANDAGE 6"

## (undated) DEVICE — SUT SOFSILK 2 60" TIES

## (undated) DEVICE — Device

## (undated) DEVICE — MEDTRONIC CLEARVIEW BLOWER MISTER KIT W TUBING SET

## (undated) DEVICE — PACING CABLE (BROWN) A/V TEMP SCREW DOWN 12FT

## (undated) DEVICE — DRAIN RESERVOIR FOR JACKSON PRATT 100CC CARDINAL

## (undated) DEVICE — SYR LUER LOK 20CC

## (undated) DEVICE — SYR LUER LOK 50CC

## (undated) DEVICE — SYNOVIS VASCULAR PROBE 1.5MM 15CM

## (undated) DEVICE — SPECIMEN CONTAINER 100ML

## (undated) DEVICE — TUBING TUR 2 PRONG

## (undated) DEVICE — GOWN XXXL

## (undated) DEVICE — PACK UNIVERSAL CARDIAC

## (undated) DEVICE — GLV 8 PROTEXIS (WHITE)

## (undated) DEVICE — GLV 6.5 PROTEXIS (WHITE)

## (undated) DEVICE — SUT BLUNT SZ 5

## (undated) DEVICE — SAW BLADE MICROAIRE STERNUM 1X34X9.4MM

## (undated) DEVICE — SYS VEIN HARVESTING VIRTUOSAPH PLUS W/ RADIAL

## (undated) DEVICE — FOLEY TRAY 16FR 5CC LF LUBRISIL ADVANCE TEMP CLOSED

## (undated) DEVICE — HEMOCONCENTRATOR PERFUSION ID TUBING 1/4"

## (undated) DEVICE — POSITIONER FOAM EGG CRATE ULNAR 2PCS (PINK)

## (undated) DEVICE — GOWN TRIMAX LG

## (undated) DEVICE — FEEDING TUBE NG SUMP 16FR 48"

## (undated) DEVICE — DRAIN CHANNEL 19FR ROUND FULL FLUTED

## (undated) DEVICE — TUBING TRUWAVE PRESSURE MALE/FEMALE 72"

## (undated) DEVICE — DRAPE 1/2 SHEET 40X57"

## (undated) DEVICE — DRSG OPSITE 2.5 X 2"

## (undated) DEVICE — TOURNIQUET SET 12FR (1 RED, 1 BLUE, 1 SNARE) 7"

## (undated) DEVICE — ELCTR BOVIE TIP BLADE INSULATED 2.75" EDGE

## (undated) DEVICE — SUT SOFSILK 0 30" V-20

## (undated) DEVICE — DRAIN PLEUROVAC

## (undated) DEVICE — VESSEL LOOP ASPEN MAXI RED

## (undated) DEVICE — GLV 8.5 PROTEXIS (WHITE)

## (undated) DEVICE — SUT POLYSORB 2 30" GS-26

## (undated) DEVICE — DRSG KLING 4"

## (undated) DEVICE — DRAPE MAYO STAND 30"

## (undated) DEVICE — SOL NORMOSOL-R PH7.4 1000ML

## (undated) DEVICE — SUT PROLENE 7-0 24" BV175-6

## (undated) DEVICE — SUT VICRYL 0 36" CTX UNDYED

## (undated) DEVICE — MEDICATION LABELS W MARKER

## (undated) DEVICE — ELCTR BOVIE TIP BLADE VALLEYLAB 6.5"

## (undated) DEVICE — STEALTH CLAMP INSERT FIBRA/FIBRA 60MM

## (undated) DEVICE — WARMING BLANKET UPPER ADULT

## (undated) DEVICE — SUT STAINLESS STEEL 5 18" SCC

## (undated) DEVICE — PREP CHLORAPREP HI-LITE ORANGE 26ML

## (undated) DEVICE — SPONGE PEANUT AUTO COUNT

## (undated) DEVICE — NDL HYPO SAFE 18G X 1.5" (PINK)

## (undated) DEVICE — TUBING SUCTION 20FT

## (undated) DEVICE — SENSOR MYOCARDIAL TEMP 15MM

## (undated) DEVICE — SYR ASEPTO

## (undated) DEVICE — DRSG OPSITE 13.75 X 4"

## (undated) DEVICE — SOL ANTI FOG

## (undated) DEVICE — VENODYNE/SCD SLEEVE CALF MEDIUM

## (undated) DEVICE — CONNECTOR "Y" 1/2 X 3/8 X 3/8"

## (undated) DEVICE — DRAPE 3/4 SHEET W REINFORCEMENT 56X77"

## (undated) DEVICE — SUT PROLENE 6-0 4-30" C-1

## (undated) DEVICE — DRAPE SLUSH / WARMER 44 X 66"

## (undated) DEVICE — VESSEL LOOP KEY SURG MAXI BLUE 2.4X1.2MM

## (undated) DEVICE — SUT SURGICAL STEEL 6 30" BP-1

## (undated) DEVICE — TUBING INSUFFLATION LAP FILTER 10FT

## (undated) DEVICE — AORTIC PUNCH 4.0MM STANDARD HANDLE

## (undated) DEVICE — SET PERF CARDIOPLEGIA 4 ARM STRL

## (undated) DEVICE — DRAPE TOWEL BLUE 17" X 24"

## (undated) DEVICE — SUT PROLENE 3-0 36" SH

## (undated) DEVICE — SUT VICRYL 2-0 27" CT-1 UNDYED

## (undated) DEVICE — SUT POLYSORB 4-0 30" CVF-23

## (undated) DEVICE — BLADE SCALPEL SAFETYLOCK #15

## (undated) DEVICE — POSITIONER CARDIAC BUMP

## (undated) DEVICE — SUT SOFSILK 4-0 18" TIES

## (undated) DEVICE — STRYKER INTERPULSE HANDPIECE W IRR SUCTION TUBE

## (undated) DEVICE — AORTIC PUNCH 4.0MM LONG LENGTH HANDLE

## (undated) DEVICE — DRAIN CHANNEL 32FR ROUND HUBLESS FULL FLUTED

## (undated) DEVICE — LAP PAD 18 X 18"

## (undated) DEVICE — BLADE SCALPEL SAFETYLOCK #11

## (undated) DEVICE — SUT POLYSORB 3-0 30" V-20 UNDYED

## (undated) DEVICE — SUT QUILL MONODERM 2-0 30CM 24MM

## (undated) DEVICE — STEALTH CLAMP INSERT FIBRA/FIBRA 90MM

## (undated) DEVICE — SUCTION CATH ARGYLE WHISTLE TIP 14FR STRAIGHT PACKED

## (undated) DEVICE — SOL IRR BAG NS 0.9% 3000ML

## (undated) DEVICE — PREP DURAPREP 26CC

## (undated) DEVICE — SUT MONOCRYL 4-0 18" PS-2

## (undated) DEVICE — TUBING ATS SUCTION LINE

## (undated) DEVICE — SUT DOUBLE 6 WIRE STERNAL

## (undated) DEVICE — BLADE SCALPEL SAFETYLOCK #10

## (undated) DEVICE — PACK UNIVERSAL CARDIAC SUPPLEMNTAL B

## (undated) DEVICE — SOL IRR POUR H2O 250ML

## (undated) DEVICE — BEAVER BLADE MINI SHARP ALL ROUND (BLUE)

## (undated) DEVICE — BULLDOG SPRING CLIP LATIS/LATIS 6MM 1/2 FORCE (BLUE)

## (undated) DEVICE — GOWN TRIMAX XXL

## (undated) DEVICE — DRSG STOCKINETTE IMPERVIOUS XL

## (undated) DEVICE — CONNECTOR STRAIGHT 3/8 X 1/2"

## (undated) DEVICE — PRESSURE MONITORING SET

## (undated) DEVICE — VISITEC 4X4

## (undated) DEVICE — PREP BETADINE SPONGE STICKS

## (undated) DEVICE — SOL IRR POUR NS 0.9% 500ML

## (undated) DEVICE — DRAPE INSTRUMENT POUCH 6.75" X 11"

## (undated) DEVICE — VENTING ADAPTER "Y" (RED/BLUE) 7.5"

## (undated) DEVICE — SUT PLEDGET PRE PUNCH 4.8 X 9.5 X 1.5 MM

## (undated) DEVICE — SUT PROLENE 7-0 4-24" BV-1

## (undated) DEVICE — DRSG KLING 6"

## (undated) DEVICE — GLV 8 PROTEXIS ORTHO (CREAM)

## (undated) DEVICE — SUT PROLENE 8-0 24" BV175-6

## (undated) DEVICE — SUT PROLENE 6-0 4-30" BV-1

## (undated) DEVICE — SUMP PERICARDIAL 20FR 1/4" ADULT

## (undated) DEVICE — SUCTION YANKAUER NO CONTROL VENT

## (undated) DEVICE — SUT POLYSORB 0 36" GS-25 UNDYED

## (undated) DEVICE — SUT POLYSORB 2-0 30" GS-21 UNDYED

## (undated) DEVICE — SUT TICRON 5 30" KV-40

## (undated) DEVICE — DRAPE LIGHT HANDLE COVER (BLUE)

## (undated) DEVICE — DRAPE IOBAN 23" X 23"

## (undated) DEVICE — SYR LUER LOK 30CC

## (undated) DEVICE — WARMING BLANKET FULL ADULT

## (undated) DEVICE — SUT PROLENE 4-0 36" SH

## (undated) DEVICE — AORTIC PUNCH 5MM STANDARD HANDLE